# Patient Record
Sex: FEMALE | Race: WHITE | Employment: PART TIME | URBAN - METROPOLITAN AREA
[De-identification: names, ages, dates, MRNs, and addresses within clinical notes are randomized per-mention and may not be internally consistent; named-entity substitution may affect disease eponyms.]

---

## 2018-10-11 ENCOUNTER — OFFICE VISIT (OUTPATIENT)
Dept: AUDIOLOGY | Facility: CLINIC | Age: 53
End: 2018-10-11
Payer: COMMERCIAL

## 2018-10-11 ENCOUNTER — OFFICE VISIT (OUTPATIENT)
Dept: OTOLARYNGOLOGY | Facility: CLINIC | Age: 53
End: 2018-10-11
Payer: COMMERCIAL

## 2018-10-11 VITALS
DIASTOLIC BLOOD PRESSURE: 60 MMHG | WEIGHT: 115 LBS | HEIGHT: 63 IN | BODY MASS INDEX: 20.37 KG/M2 | SYSTOLIC BLOOD PRESSURE: 98 MMHG | TEMPERATURE: 99 F

## 2018-10-11 DIAGNOSIS — H93.12 TINNITUS OF LEFT EAR: Primary | ICD-10-CM

## 2018-10-11 DIAGNOSIS — H93.12 TINNITUS, LEFT: Primary | ICD-10-CM

## 2018-10-11 PROCEDURE — 92557 COMPREHENSIVE HEARING TEST: CPT | Performed by: AUDIOLOGIST

## 2018-10-11 PROCEDURE — 99243 OFF/OP CNSLTJ NEW/EST LOW 30: CPT | Performed by: OTOLARYNGOLOGY

## 2018-10-11 PROCEDURE — 99212 OFFICE O/P EST SF 10 MIN: CPT | Performed by: OTOLARYNGOLOGY

## 2018-10-11 PROCEDURE — 92570 ACOUSTIC IMMITANCE TESTING: CPT | Performed by: AUDIOLOGIST

## 2018-10-11 RX ORDER — ALPRAZOLAM 0.5 MG/1
0.5 TABLET ORAL NIGHTLY PRN
COMMUNITY

## 2018-10-11 RX ORDER — DEXTROAMPHETAMINE SACCHARATE, AMPHETAMINE ASPARTATE, DEXTROAMPHETAMINE SULFATE AND AMPHETAMINE SULFATE 2.5; 2.5; 2.5; 2.5 MG/1; MG/1; MG/1; MG/1
10 TABLET ORAL DAILY
COMMUNITY

## 2018-10-11 RX ORDER — OXCARBAZEPINE 300 MG/1
300 TABLET, FILM COATED ORAL 3 TIMES DAILY
COMMUNITY

## 2018-10-11 RX ORDER — LANSOPRAZOLE 30 MG/1
30 CAPSULE, DELAYED RELEASE ORAL
COMMUNITY
End: 2020-03-17

## 2018-10-11 RX ORDER — ZOLPIDEM TARTRATE 10 MG/1
10 TABLET ORAL NIGHTLY PRN
COMMUNITY

## 2018-10-11 RX ORDER — DULOXETIN HYDROCHLORIDE 30 MG/1
30 CAPSULE, DELAYED RELEASE ORAL DAILY
COMMUNITY
End: 2021-04-29

## 2018-10-11 NOTE — PROGRESS NOTES
Remi Levy is a 47year old female. Patient presents with:  Ringing In Ear: in left ear for years, getting  worse, feel unbalanced lately      HISTORY OF PRESENT ILLNESS  She presents with a 10-year history of asymmetric left ear ringing.   She feels joselito weight loss. ENMT Negative Drooling. Eyes Negative Blurred vision and vision changes. Respiratory Negative Dyspnea and wheezing. Cardio Negative Chest pain, irregular heartbeat/palpitations and syncope. GI Negative Abdominal pain and diarrhea. Outpatient Medications:   •  lansoprazole (PREVACID) 30 MG Oral Capsule Delayed Release, Take 30 mg by mouth every morning before breakfast., Disp: , Rfl:   •  Zolpidem Tartrate 10 MG Oral Tab, Take 10 mg by mouth nightly as needed for Sleep., Disp: , Rfl:

## 2018-10-11 NOTE — PATIENT INSTRUCTIONS
How Hearing Aids Can Help You    If you’re losing your hearing, it can be frustrating: But, hearing aids can help you hear what Voncille Flattery been missing. Not everyone who has hearing loss needs hearing aids.  But if your hearing loss is keeping you from commun © 8328-3266 [employer] [address] Andreina [address]., [place], [address]. All rights reserved. This information is not intended as a substitute for professional medical care. Always follow your healthcare professional's instructions.

## 2018-10-11 NOTE — PROGRESS NOTES
AUDIOGRAM     Edgar Alvarez was referred for testing by Maggie Miller for left sided tinnitus  7/18/1965  [id number]        Otoscopic inspection: right ear no cerumen; left ear no cerumen.        Tests/Procedures  Patient was tested via  standard i

## 2020-02-18 ENCOUNTER — TELEPHONE (OUTPATIENT)
Dept: OBGYN CLINIC | Facility: CLINIC | Age: 55
End: 2020-02-18

## 2020-03-13 ENCOUNTER — TELEPHONE (OUTPATIENT)
Dept: FAMILY MEDICINE CLINIC | Facility: CLINIC | Age: 55
End: 2020-03-13

## 2020-03-13 RX ORDER — LANSOPRAZOLE 30 MG/1
30 CAPSULE, DELAYED RELEASE ORAL
Qty: 30 CAPSULE | Refills: 0 | Status: SHIPPED | OUTPATIENT
Start: 2020-03-13 | End: 2020-12-30

## 2020-03-13 NOTE — TELEPHONE ENCOUNTER
[facility] now [facility] This [facility] refilled med with #30    Pt was scheduled to see [address] on 2/17/2020 but missed appmnt; pt is scheduled to see [address] on 4/3/2020. LOV with [address] was 3/11/19; last Rf then given for med below x 1 year.

## 2020-03-17 RX ORDER — LANSOPRAZOLE 30 MG/1
30 CAPSULE, DELAYED RELEASE ORAL
Qty: 90 CAPSULE | Refills: 1 | Status: SHIPPED | OUTPATIENT
Start: 2020-03-17 | End: 2020-07-27

## 2020-03-17 NOTE — TELEPHONE ENCOUNTER
lansoprazole (PREVACID) 30 MG Oral Capsule Delayed Release pt needs refill sent to [facility] pharmacy ASAP!

## 2020-07-27 RX ORDER — LANSOPRAZOLE 30 MG/1
CAPSULE, DELAYED RELEASE ORAL
Qty: 90 CAPSULE | Refills: 1 | Status: SHIPPED | OUTPATIENT
Start: 2020-07-27 | End: 2020-09-28

## 2020-07-27 NOTE — TELEPHONE ENCOUNTER
A refill request was received for:  Requested Prescriptions     Pending Prescriptions Disp Refills   • LANSOPRAZOLE 30 MG Oral Capsule Delayed Release [Pharmacy Med Name: Bao Farrell DR CAP 30MG RX] 90 capsule 1     Sig: TAKE 1 CAPSULE EVERY       MORNING BE

## 2020-09-21 ENCOUNTER — TELEPHONE (OUTPATIENT)
Dept: FAMILY MEDICINE CLINIC | Facility: CLINIC | Age: 55
End: 2020-09-21

## 2020-09-21 RX ORDER — VALACYCLOVIR HYDROCHLORIDE 1 G/1
1 TABLET, FILM COATED ORAL EVERY 12 HOURS SCHEDULED
Qty: 21 TABLET | Refills: 0 | Status: SHIPPED | OUTPATIENT
Start: 2020-09-21 | End: 2020-12-12

## 2020-09-21 RX ORDER — VALACYCLOVIR HYDROCHLORIDE 1 G/1
TABLET, FILM COATED ORAL EVERY 12 HOURS SCHEDULED
COMMUNITY
End: 2020-09-21

## 2020-09-21 NOTE — TELEPHONE ENCOUNTER
A refill request was received for:  Requested Prescriptions     Pending Prescriptions Disp Refills   • valACYclovir HCl 1 G Oral Tab 21 tablet 0     Sig: Take 1 tablet (1,000 mg total) by mouth every 12 (twelve) hours.      Last refill date:  2015  Qty:   L

## 2020-09-21 NOTE — TELEPHONE ENCOUNTER
Patient requesting an refill on Valacyclovir states will like to be sent to [facility] in [facility] ,[address] [facility]

## 2020-09-21 NOTE — TELEPHONE ENCOUNTER
Called pt and informed that due for visit, rescheduled sooner for physical. Will route to Dr. Renetta Hammonds medication refill of Valacyclovir. Pt verbalized understanding.

## 2020-09-22 NOTE — TELEPHONE ENCOUNTER
Pt is scheduled to see [facility] on 9/28/2020. Left VM that med was Rf as below. Pt to call with any questions. Becky White, DO  You; Kymberly Rasmussen RN 9 minutes ago (1:58 PM)     Please send refill for valtrex.     Message text      Outpatient Medication Detail

## 2020-09-28 ENCOUNTER — TELEPHONE (OUTPATIENT)
Dept: FAMILY MEDICINE CLINIC | Facility: CLINIC | Age: 55
End: 2020-09-28

## 2020-09-28 ENCOUNTER — OFFICE VISIT (OUTPATIENT)
Dept: FAMILY MEDICINE CLINIC | Facility: CLINIC | Age: 55
End: 2020-09-28
Payer: COMMERCIAL

## 2020-09-28 VITALS
SYSTOLIC BLOOD PRESSURE: 122 MMHG | OXYGEN SATURATION: 99 % | BODY MASS INDEX: 20.91 KG/M2 | TEMPERATURE: 98 F | DIASTOLIC BLOOD PRESSURE: 72 MMHG | HEIGHT: 63 IN | HEART RATE: 81 BPM | WEIGHT: 118 LBS

## 2020-09-28 DIAGNOSIS — J31.0 CHRONIC RHINITIS: ICD-10-CM

## 2020-09-28 DIAGNOSIS — Z12.4 CERVICAL CANCER SCREENING: ICD-10-CM

## 2020-09-28 DIAGNOSIS — Z00.00 WELLNESS EXAMINATION: ICD-10-CM

## 2020-09-28 DIAGNOSIS — J06.9 VIRAL UPPER RESPIRATORY TRACT INFECTION: ICD-10-CM

## 2020-09-28 DIAGNOSIS — M85.80 OSTEOPENIA, UNSPECIFIED LOCATION: ICD-10-CM

## 2020-09-28 DIAGNOSIS — D64.9 ANEMIA, UNSPECIFIED TYPE: ICD-10-CM

## 2020-09-28 DIAGNOSIS — R79.89 ELEVATED LFTS: ICD-10-CM

## 2020-09-28 DIAGNOSIS — K21.9 GASTROESOPHAGEAL REFLUX DISEASE WITHOUT ESOPHAGITIS: ICD-10-CM

## 2020-09-28 DIAGNOSIS — Z12.31 ENCOUNTER FOR SCREENING MAMMOGRAM FOR BREAST CANCER: Primary | ICD-10-CM

## 2020-09-28 DIAGNOSIS — E55.9 VITAMIN D DEFICIENCY: ICD-10-CM

## 2020-09-28 DIAGNOSIS — Z12.11 COLON CANCER SCREENING: ICD-10-CM

## 2020-09-28 PROCEDURE — 3074F SYST BP LT 130 MM HG: CPT | Performed by: FAMILY MEDICINE

## 2020-09-28 PROCEDURE — 3078F DIAST BP <80 MM HG: CPT | Performed by: FAMILY MEDICINE

## 2020-09-28 PROCEDURE — 90471 IMMUNIZATION ADMIN: CPT | Performed by: FAMILY MEDICINE

## 2020-09-28 PROCEDURE — 3008F BODY MASS INDEX DOCD: CPT | Performed by: FAMILY MEDICINE

## 2020-09-28 PROCEDURE — 90686 IIV4 VACC NO PRSV 0.5 ML IM: CPT | Performed by: FAMILY MEDICINE

## 2020-09-28 PROCEDURE — 99396 PREV VISIT EST AGE 40-64: CPT | Performed by: FAMILY MEDICINE

## 2020-09-28 PROCEDURE — 88175 CYTOPATH C/V AUTO FLUID REDO: CPT | Performed by: FAMILY MEDICINE

## 2020-09-28 RX ORDER — FLUTICASONE PROPIONATE 50 MCG
2 SPRAY, SUSPENSION (ML) NASAL DAILY
Qty: 3 BOTTLE | Refills: 3 | Status: SHIPPED | OUTPATIENT
Start: 2020-09-28 | End: 2020-09-28

## 2020-09-28 RX ORDER — FLUTICASONE PROPIONATE 50 MCG
2 SPRAY, SUSPENSION (ML) NASAL DAILY
Qty: 3 BOTTLE | Refills: 3 | Status: SHIPPED | OUTPATIENT
Start: 2020-09-28 | End: 2020-10-12

## 2020-09-28 RX ORDER — DOXEPIN HYDROCHLORIDE 10 MG/1
30 CAPSULE ORAL NIGHTLY
Refills: 0 | COMMUNITY
Start: 2020-09-28

## 2020-09-28 NOTE — PROGRESS NOTES
HPI:    Patient ID: Yinka Lopes is a 48year old female who presents for physical.  Pt with remote hx of alcohol abuse, hx of elevated LFTs  Alcohol of choice:  1 bottle /day 7.5  Vodka:  1/2 bottle of fifth, 2 years  Patient complains of chronic rhinitis surgical history. Current Outpatient Medications   Medication Sig Dispense Refill   • Doxepin HCl 10 MG Oral Cap Take 1 capsule (10 mg total) by mouth nightly. 0   • Fluticasone Propionate 50 MCG/ACT Nasal Suspension 2 sprays by Each Nare route daily. distress  HEENT normal  Neck supple without adenopathy, thyromegaly, carotid bruit  Heart regular rate and rhythm without murmur  Lungs clear to auscultation  Abdomen soft without visceromegaly, masses, tenderness  Bilateral breast exam without masses, ski capsule (10 mg total) by mouth nightly.; Refill: 0  - Fluticasone Propionate 50 MCG/ACT Nasal Suspension; 2 sprays by Each Nare route daily. Dispense: 3 Bottle;  Refill: 3       Meds This Visit:  Requested Prescriptions     Signed Prescriptions Disp Refill

## 2020-09-28 NOTE — TELEPHONE ENCOUNTER
Doxepin HCl 10 MG Oral Cap  0 9/28/2020    Sig:   Take 1 capsule (10 mg total) by mouth nightly. Route:   Oral     Class:   Historical     Order #:   [id number]      Above medication is historical per Dr Horacio Ames.             Fluticasone Propionate 50 MCG/A

## 2020-09-28 NOTE — PATIENT INSTRUCTIONS
Get shingles shot called Shingrix. 2 shot series. Get one and then the repeat one 3 to 6 months later.   You can get it at [facility] or [place] [address].

## 2020-10-01 ENCOUNTER — LAB ENCOUNTER (OUTPATIENT)
Dept: LAB | Age: 55
End: 2020-10-01
Attending: FAMILY MEDICINE
Payer: COMMERCIAL

## 2020-10-01 DIAGNOSIS — D64.9 ANEMIA, UNSPECIFIED TYPE: ICD-10-CM

## 2020-10-01 DIAGNOSIS — J06.9 VIRAL UPPER RESPIRATORY TRACT INFECTION: ICD-10-CM

## 2020-10-01 DIAGNOSIS — Z00.00 WELLNESS EXAMINATION: ICD-10-CM

## 2020-10-01 DIAGNOSIS — R79.89 ELEVATED LFTS: ICD-10-CM

## 2020-10-01 DIAGNOSIS — E55.9 VITAMIN D DEFICIENCY: ICD-10-CM

## 2020-10-01 PROCEDURE — 84466 ASSAY OF TRANSFERRIN: CPT

## 2020-10-01 PROCEDURE — 83540 ASSAY OF IRON: CPT

## 2020-10-01 PROCEDURE — 82607 VITAMIN B-12: CPT

## 2020-10-01 PROCEDURE — 86803 HEPATITIS C AB TEST: CPT

## 2020-10-01 PROCEDURE — 85025 COMPLETE CBC W/AUTO DIFF WBC: CPT

## 2020-10-01 PROCEDURE — 80061 LIPID PANEL: CPT

## 2020-10-01 PROCEDURE — 36415 COLL VENOUS BLD VENIPUNCTURE: CPT

## 2020-10-01 PROCEDURE — 82306 VITAMIN D 25 HYDROXY: CPT

## 2020-10-01 PROCEDURE — 80053 COMPREHEN METABOLIC PANEL: CPT

## 2020-10-01 PROCEDURE — 81001 URINALYSIS AUTO W/SCOPE: CPT

## 2020-10-02 ENCOUNTER — PATIENT MESSAGE (OUTPATIENT)
Dept: FAMILY MEDICINE CLINIC | Facility: CLINIC | Age: 55
End: 2020-10-02

## 2020-10-05 NOTE — TELEPHONE ENCOUNTER
From: Shruti Frederick  To: Parul Do DO  Sent: 10/2/2020 6:38 PM CDT  Subject: Test Results Question    I only got the results for the IRON test. I should have been tested for many other things. Why did I only get results for this?

## 2020-10-07 ENCOUNTER — TELEPHONE (OUTPATIENT)
Dept: FAMILY MEDICINE CLINIC | Facility: CLINIC | Age: 55
End: 2020-10-07

## 2020-10-07 ENCOUNTER — PATIENT MESSAGE (OUTPATIENT)
Dept: FAMILY MEDICINE CLINIC | Facility: CLINIC | Age: 55
End: 2020-10-07

## 2020-10-07 DIAGNOSIS — E87.1 HYPONATREMIA: Primary | ICD-10-CM

## 2020-10-08 ENCOUNTER — TELEPHONE (OUTPATIENT)
Dept: FAMILY MEDICINE CLINIC | Facility: CLINIC | Age: 55
End: 2020-10-08

## 2020-10-08 NOTE — TELEPHONE ENCOUNTER
Andre Shah DO 10/8/2020 8:20 AM CDT    Please notify pt shingrix vaccine ordered.   ----- Message -----  From: Iona Chery RN  Sent: 10/7/2020 12:01 PM CDT  To: Gaston Kirk DO  Subject: FW: Test Results Question       ----- Message -----  From: Tiki Vasquez

## 2020-10-12 DIAGNOSIS — E87.1 HYPONATREMIA: Primary | ICD-10-CM

## 2020-10-12 RX ORDER — FLUTICASONE PROPIONATE 50 MCG
2 SPRAY, SUSPENSION (ML) NASAL DAILY
Qty: 3 BOTTLE | Refills: 3 | Status: SHIPPED | OUTPATIENT
Start: 2020-10-12 | End: 2021-10-07

## 2020-10-14 NOTE — TELEPHONE ENCOUNTER
Background, Traci 10/13/2020 9:01 PM CDT    I never heard back from you. I said I was getting this test done on the 19th. Can you add the thyroid test on that day please? Just to save time going back and forth and delaying figuring out what is wrong.

## 2020-10-19 ENCOUNTER — NURSE ONLY (OUTPATIENT)
Dept: FAMILY MEDICINE CLINIC | Facility: CLINIC | Age: 55
End: 2020-10-19
Payer: COMMERCIAL

## 2020-10-19 ENCOUNTER — LABORATORY ENCOUNTER (OUTPATIENT)
Dept: LAB | Facility: REFERENCE LAB | Age: 55
End: 2020-10-19
Attending: FAMILY MEDICINE
Payer: COMMERCIAL

## 2020-10-19 DIAGNOSIS — E87.1 HYPONATREMIA: ICD-10-CM

## 2020-10-19 PROCEDURE — 90471 IMMUNIZATION ADMIN: CPT | Performed by: FAMILY MEDICINE

## 2020-10-19 PROCEDURE — 90750 HZV VACC RECOMBINANT IM: CPT | Performed by: FAMILY MEDICINE

## 2020-10-19 PROCEDURE — 36415 COLL VENOUS BLD VENIPUNCTURE: CPT

## 2020-10-19 PROCEDURE — 84439 ASSAY OF FREE THYROXINE: CPT

## 2020-10-19 PROCEDURE — 80048 BASIC METABOLIC PNL TOTAL CA: CPT

## 2020-10-19 PROCEDURE — 84443 ASSAY THYROID STIM HORMONE: CPT

## 2020-10-19 NOTE — TELEPHONE ENCOUNTER
Good Morning.       Can you tell me as I can't understand many of the test results, what my results came back for the Covid antibody test?  I was supposed to have that done at my last blood test.       TY

## 2020-10-20 DIAGNOSIS — J06.9 VIRAL UPPER RESPIRATORY TRACT INFECTION: Primary | ICD-10-CM

## 2020-10-22 ENCOUNTER — PATIENT MESSAGE (OUTPATIENT)
Dept: FAMILY MEDICINE CLINIC | Facility: CLINIC | Age: 55
End: 2020-10-22

## 2020-10-22 ENCOUNTER — TELEPHONE (OUTPATIENT)
Dept: FAMILY MEDICINE CLINIC | Facility: CLINIC | Age: 55
End: 2020-10-22

## 2020-10-22 DIAGNOSIS — E87.1 HYPONATREMIA WITH DECREASED SERUM OSMOLALITY: Primary | ICD-10-CM

## 2020-10-22 NOTE — TELEPHONE ENCOUNTER
Attempted to call Dr. yRder Murray to address pt's concerns but no answer.   Informed that it is Dr. Tracy Benson day off, non-emergent issue and may not answer her calls but will also speak with her tomorrow in the am.    URGENT!         [address] ADD

## 2020-10-22 NOTE — TELEPHONE ENCOUNTER
Pt called and scheduled lab, was told to fast and informed that not needed. Pt verbalized understanding.

## 2020-10-22 NOTE — TELEPHONE ENCOUNTER
Rosibel Shah, DO  to Minus Diamond          10/22/20 11:02 AM  Janine Fan Edward Bynum,     I left a message on your phone.     Your sodium has gone lower.   Please come in early tomorrow AM for blood /urine tests to help determine the cause of your low sodium.

## 2020-10-23 ENCOUNTER — LAB ENCOUNTER (OUTPATIENT)
Dept: LAB | Age: 55
End: 2020-10-23
Attending: FAMILY MEDICINE
Payer: COMMERCIAL

## 2020-10-23 ENCOUNTER — APPOINTMENT (OUTPATIENT)
Dept: LAB | Age: 55
End: 2020-10-23
Attending: FAMILY MEDICINE
Payer: COMMERCIAL

## 2020-10-23 DIAGNOSIS — J06.9 VIRAL UPPER RESPIRATORY TRACT INFECTION: ICD-10-CM

## 2020-10-23 DIAGNOSIS — E87.1 HYPO-OSMOLAR HYPONATREMIA: Primary | ICD-10-CM

## 2020-10-23 DIAGNOSIS — E87.1 HYPONATREMIA WITH DECREASED SERUM OSMOLALITY: ICD-10-CM

## 2020-10-23 PROCEDURE — 80048 BASIC METABOLIC PNL TOTAL CA: CPT

## 2020-10-23 PROCEDURE — 86769 SARS-COV-2 COVID-19 ANTIBODY: CPT

## 2020-10-23 PROCEDURE — 82533 TOTAL CORTISOL: CPT

## 2020-10-23 PROCEDURE — 36415 COLL VENOUS BLD VENIPUNCTURE: CPT

## 2020-10-23 PROCEDURE — 83935 ASSAY OF URINE OSMOLALITY: CPT

## 2020-10-23 PROCEDURE — 84300 ASSAY OF URINE SODIUM: CPT

## 2020-10-23 NOTE — TELEPHONE ENCOUNTER
To: ROSAG 10 DR. JEF TAY      From: Mari Him      Created: 10/23/2020 1:16 PM            I didn't Del Rosario's I had to ask that question until I got home. Can  you get name for me? Called pt to ask about my chart message above.   Per pt did not know

## 2020-10-23 NOTE — TELEPHONE ENCOUNTER
From: Stefanie Hu  To: Dean Rodas DO  Sent: 10/22/2020 2:10 PM CDT  Subject: Other    URGENT! DR Elizabet Cristobal PLEASE TEST ME FOR SYLWIA'S DISEASE TOMORROW. PLEASE PLEASE PLEASE PUT ON MY LAB FOR TOMORROW. I WILL BE IN YOUR OFFICE AT 9:00.  I THINK THIS IS

## 2020-10-23 NOTE — TELEPHONE ENCOUNTER
Spoke with Dr. Ghanshyam Winters as planned this am at 0820 and appropriate labs order in the system. My chart message sent.     Me  to Martin Woodall          8:31 AM  Janine Ramirez,      Per Dr. Ghanshyam Winters the appropriate labs are in the system, also ask to speak to the [facility]

## 2020-10-24 ENCOUNTER — TELEPHONE (OUTPATIENT)
Dept: ENDOCRINOLOGY CLINIC | Facility: CLINIC | Age: 55
End: 2020-10-24

## 2020-10-24 NOTE — TELEPHONE ENCOUNTER
----- Message from Katie Manrique DO sent at 10/23/2020  5:12 PM CDT -----  I spoke with you today about this patient with euvolemic hypotonic hyponatremia.     Dr Otero

## 2020-10-24 NOTE — TELEPHONE ENCOUNTER
Endo staff please schedule patient for appointment next week with Dr. Miki Shelley. SY - do you have any openings? Or could you add her somewhere earlier? She currently has appt with you on 11/11. Thanks.

## 2020-10-26 NOTE — TELEPHONE ENCOUNTER
I think there was some confusion on this call. She needs to be seen this week - patient paged after hours. Told her to come Wednesday, October 28 at 1:30pm with dr. Dot Arias. Please book appointment. I know it is slightly an overbook.       [address] Miguel Angel Jackson

## 2020-10-26 NOTE — TELEPHONE ENCOUNTER
Called to schedule apt.  Pt states she was expecting a call from a different doctor to be seen sooner but she is not to sure if that will happen since it is 5:00pm. She asked to be called tomorrow around 1:30pm.    PLEASE CALL TOMORROW !!!

## 2020-10-26 NOTE — TELEPHONE ENCOUNTER
Forwarded to [address] [place] [address] staff to schedule appointment with Dr. Heladio Carranza.

## 2020-10-27 NOTE — TELEPHONE ENCOUNTER
Below appointment booked. Please advise if a change is needed. Thank you.      Future Appointments   Date Time Provider Sandeep Ventura   10/28/2020  1:40 PM Keegan Miller MD [address] [place] [address]

## 2020-10-28 ENCOUNTER — OFFICE VISIT (OUTPATIENT)
Dept: ENDOCRINOLOGY CLINIC | Facility: CLINIC | Age: 55
End: 2020-10-28
Payer: COMMERCIAL

## 2020-10-28 ENCOUNTER — LAB ENCOUNTER (OUTPATIENT)
Dept: LAB | Facility: HOSPITAL | Age: 55
End: 2020-10-28
Attending: INTERNAL MEDICINE
Payer: COMMERCIAL

## 2020-10-28 VITALS
BODY MASS INDEX: 20.02 KG/M2 | WEIGHT: 113 LBS | DIASTOLIC BLOOD PRESSURE: 69 MMHG | HEART RATE: 77 BPM | SYSTOLIC BLOOD PRESSURE: 130 MMHG | HEIGHT: 63 IN

## 2020-10-28 DIAGNOSIS — E87.1 HYPONATREMIA: ICD-10-CM

## 2020-10-28 DIAGNOSIS — R53.83 FATIGUE, UNSPECIFIED TYPE: ICD-10-CM

## 2020-10-28 DIAGNOSIS — E87.1 HYPONATREMIA: Primary | ICD-10-CM

## 2020-10-28 PROCEDURE — 84443 ASSAY THYROID STIM HORMONE: CPT

## 2020-10-28 PROCEDURE — 82670 ASSAY OF TOTAL ESTRADIOL: CPT

## 2020-10-28 PROCEDURE — 3075F SYST BP GE 130 - 139MM HG: CPT | Performed by: INTERNAL MEDICINE

## 2020-10-28 PROCEDURE — 84480 ASSAY TRIIODOTHYRONINE (T3): CPT

## 2020-10-28 PROCEDURE — 3008F BODY MASS INDEX DOCD: CPT | Performed by: INTERNAL MEDICINE

## 2020-10-28 PROCEDURE — 84146 ASSAY OF PROLACTIN: CPT

## 2020-10-28 PROCEDURE — 83001 ASSAY OF GONADOTROPIN (FSH): CPT

## 2020-10-28 PROCEDURE — 99243 OFF/OP CNSLTJ NEW/EST LOW 30: CPT | Performed by: INTERNAL MEDICINE

## 2020-10-28 PROCEDURE — 83002 ASSAY OF GONADOTROPIN (LH): CPT

## 2020-10-28 PROCEDURE — 36415 COLL VENOUS BLD VENIPUNCTURE: CPT

## 2020-10-28 PROCEDURE — 3078F DIAST BP <80 MM HG: CPT | Performed by: INTERNAL MEDICINE

## 2020-10-28 PROCEDURE — 84305 ASSAY OF SOMATOMEDIN: CPT

## 2020-10-28 PROCEDURE — 84439 ASSAY OF FREE THYROXINE: CPT

## 2020-10-28 NOTE — TELEPHONE ENCOUNTER
See lab results, on 10/26/20 Dr. Catalina Ragsdale informed pt via my chart that results are normal.  Also pt saw Dr. Adenike Chambers at [facility].

## 2020-10-28 NOTE — H&P
New Patient Evaluation - History and Physical    CONSULT - Reason for Visit:  Hyponatremia. Requesting Physician: Dr. Smita Hong.     CHIEF COMPLAINT:  Patient presents with:  Consult: pt c/o hot flashes, polyuria, excessive thirst, brain fog, chronic fatigue, Suspension 2 sprays by Each Nare route daily. 3 Bottle 3   • Doxepin HCl 10 MG Oral Cap Take 1 capsule (10 mg total) by mouth nightly. 0   • valACYclovir HCl 1 G Oral Tab Take 1 tablet (1,000 mg total) by mouth every 12 (twelve) hours.  21 tablet 0   • ruby Normocephalic, atraumatic  NECK:  Supple, symmetrical, trachea midline, no adenopathy, thyroid symmetric, not enlarged and no tenderness  LUNGS: clear to auscultation bilaterally, no crackles or wheezing  CARDIOVASCULAR:  regular rate and rhythm, normal S1

## 2020-10-29 ENCOUNTER — TELEPHONE (OUTPATIENT)
Dept: ENDOCRINOLOGY CLINIC | Facility: CLINIC | Age: 55
End: 2020-10-29

## 2020-10-29 ENCOUNTER — PATIENT MESSAGE (OUTPATIENT)
Dept: ENDOCRINOLOGY CLINIC | Facility: CLINIC | Age: 55
End: 2020-10-29

## 2020-10-29 DIAGNOSIS — R53.83 FATIGUE, UNSPECIFIED TYPE: Primary | ICD-10-CM

## 2020-10-29 NOTE — PROGRESS NOTES
LVM that results per Dr. Sultana Fortune are normal, noted test results viewed by pt and encouraged to call if have questions.

## 2020-10-30 ENCOUNTER — TELEPHONE (OUTPATIENT)
Dept: ENDOCRINOLOGY CLINIC | Facility: CLINIC | Age: 55
End: 2020-10-30

## 2020-10-30 DIAGNOSIS — E03.9 HYPOTHYROIDISM, UNSPECIFIED TYPE: Primary | ICD-10-CM

## 2020-10-30 NOTE — TELEPHONE ENCOUNTER
Please call patient and let her know that her TSH is normal and that her low free T4 is most likely due to the medication that she is taking (oxcarbazepine) which has been known to be associated with a low free T4. We are waiting for the IGF-1 level.  I

## 2020-10-30 NOTE — TELEPHONE ENCOUNTER
From: Hansa Farrell  To: Kaycee Aguilar MD  Sent: 10/29/2020 7:59 PM CDT  Subject: Test Results Question    Hello I saw your message. What does that mean?  I am assuming I still have a thyroid issue and need to limit my water intake and come back in a

## 2020-10-30 NOTE — TELEPHONE ENCOUNTER
From: Aman Espinoza  To: Kaycee Lion MD  Sent: 10/29/2020 8:04 PM CDT  Subject: Test Results Question    I'm wondering I called your office to make sure you received my. Messages from yesterday which you didn't answer.  So more testing and then you

## 2020-11-02 ENCOUNTER — TELEPHONE (OUTPATIENT)
Dept: ENDOCRINOLOGY CLINIC | Facility: CLINIC | Age: 55
End: 2020-11-02

## 2020-11-02 NOTE — TELEPHONE ENCOUNTER
rn called patient  And informed orders are in system and she can call central sched.  To book sina, verbalized understanding

## 2020-11-02 NOTE — TELEPHONE ENCOUNTER
Hi!  I sent a reply to the patient saying that it would be fine for her to have the blood test the morning of or the day before the visit. Thank you!   Kaycee

## 2020-11-02 NOTE — TELEPHONE ENCOUNTER
Regarding: Test Results Question  Contact: [phone]  ----- Message from Nila Ames RN sent at 11/2/2020  9:49 AM CST -----       ----- Message from Viji Das to Lexx Grant MD sent at 11/1/2020  8:32 PM -----   I have a question ab

## 2020-11-04 ENCOUNTER — OFFICE VISIT (OUTPATIENT)
Dept: ENDOCRINOLOGY CLINIC | Facility: CLINIC | Age: 55
End: 2020-11-04
Payer: COMMERCIAL

## 2020-11-04 ENCOUNTER — LAB ENCOUNTER (OUTPATIENT)
Dept: LAB | Facility: HOSPITAL | Age: 55
End: 2020-11-04
Attending: OTOLARYNGOLOGY
Payer: COMMERCIAL

## 2020-11-04 VITALS
HEIGHT: 63 IN | RESPIRATION RATE: 17 BRPM | SYSTOLIC BLOOD PRESSURE: 109 MMHG | HEART RATE: 85 BPM | WEIGHT: 116 LBS | DIASTOLIC BLOOD PRESSURE: 54 MMHG | BODY MASS INDEX: 20.55 KG/M2

## 2020-11-04 DIAGNOSIS — E03.9 HYPOTHYROIDISM, UNSPECIFIED TYPE: ICD-10-CM

## 2020-11-04 DIAGNOSIS — E87.1 HYPONATREMIA: ICD-10-CM

## 2020-11-04 DIAGNOSIS — E03.9 ACQUIRED HYPOTHYROIDISM: ICD-10-CM

## 2020-11-04 DIAGNOSIS — E22.2 SIADH (SYNDROME OF INAPPROPRIATE ADH PRODUCTION) (HCC): Primary | ICD-10-CM

## 2020-11-04 PROCEDURE — 84439 ASSAY OF FREE THYROXINE: CPT

## 2020-11-04 PROCEDURE — 99243 OFF/OP CNSLTJ NEW/EST LOW 30: CPT | Performed by: INTERNAL MEDICINE

## 2020-11-04 PROCEDURE — 99072 ADDL SUPL MATRL&STAF TM PHE: CPT | Performed by: INTERNAL MEDICINE

## 2020-11-04 PROCEDURE — 3008F BODY MASS INDEX DOCD: CPT | Performed by: INTERNAL MEDICINE

## 2020-11-04 PROCEDURE — 80048 BASIC METABOLIC PNL TOTAL CA: CPT

## 2020-11-04 PROCEDURE — 3074F SYST BP LT 130 MM HG: CPT | Performed by: INTERNAL MEDICINE

## 2020-11-04 PROCEDURE — 3078F DIAST BP <80 MM HG: CPT | Performed by: INTERNAL MEDICINE

## 2020-11-04 PROCEDURE — 36415 COLL VENOUS BLD VENIPUNCTURE: CPT

## 2020-11-04 PROCEDURE — 84443 ASSAY THYROID STIM HORMONE: CPT

## 2020-11-04 RX ORDER — CETIRIZINE HYDROCHLORIDE 10 MG/1
TABLET ORAL
COMMUNITY

## 2020-11-04 NOTE — PROGRESS NOTES
Follow-up- Reason for Visit:  Hyponatremia. Requesting Physician: Dr. Karine Garcia. CHIEF COMPLAINT:  Fatigue     INITIAL VISIT:   Sean Vides is a 48year old female who presents with hyponatremia, fatigue and polydipsia.  She states that she fills her wa swelling, dysphonia  Respiratory: Negative for:  dyspnea, cough  Cardiovascular: Negative for:  chest pain, palpitations, orthopnea  GI: Negative for:  abdominal pain, nausea, vomiting, diarrhea, constipation, bleeding  Neurology: Negative for: headache, n [place] Wetzel- [phone], to see if she can be switched to other medications that will not cause SIADH      11/04/2020  Kaycee Rangel MD

## 2020-11-05 DIAGNOSIS — E03.9 HYPOTHYROIDISM (ACQUIRED): Primary | ICD-10-CM

## 2020-11-05 RX ORDER — LEVOTHYROXINE SODIUM 0.03 MG/1
25 TABLET ORAL
Qty: 90 TABLET | Refills: 1 | Status: SHIPPED | OUTPATIENT
Start: 2020-11-05 | End: 2021-01-07

## 2020-11-27 ENCOUNTER — PATIENT MESSAGE (OUTPATIENT)
Dept: ENDOCRINOLOGY CLINIC | Facility: CLINIC | Age: 55
End: 2020-11-27

## 2020-11-27 NOTE — TELEPHONE ENCOUNTER
Received [employer] message from [address]:    Message below. CardioVIPhart message sent to patient.

## 2020-11-27 NOTE — TELEPHONE ENCOUNTER
From: Donna Notice  To: Kaycee Rangel MD  Sent: 11/27/2020 9:20 AM CST  Subject: Referral Request    [facility]! I have referred my mom to you Mosetta Gosselin) and checked with her primary first Carry Rocky).  She feels as bad as I did wh

## 2020-12-14 ENCOUNTER — LAB ENCOUNTER (OUTPATIENT)
Dept: LAB | Facility: REFERENCE LAB | Age: 55
End: 2020-12-14
Attending: INTERNAL MEDICINE
Payer: COMMERCIAL

## 2020-12-14 DIAGNOSIS — E03.9 HYPOTHYROIDISM (ACQUIRED): ICD-10-CM

## 2020-12-14 PROCEDURE — 36415 COLL VENOUS BLD VENIPUNCTURE: CPT

## 2020-12-14 PROCEDURE — 84439 ASSAY OF FREE THYROXINE: CPT

## 2020-12-14 PROCEDURE — 84443 ASSAY THYROID STIM HORMONE: CPT

## 2020-12-14 RX ORDER — VALACYCLOVIR HYDROCHLORIDE 1 G/1
1 TABLET, FILM COATED ORAL EVERY 12 HOURS SCHEDULED
Qty: 21 TABLET | Refills: 0 | Status: SHIPPED | OUTPATIENT
Start: 2020-12-14

## 2020-12-16 ENCOUNTER — PATIENT MESSAGE (OUTPATIENT)
Dept: ENDOCRINOLOGY CLINIC | Facility: CLINIC | Age: 55
End: 2020-12-16

## 2020-12-16 ENCOUNTER — TELEMEDICINE (OUTPATIENT)
Dept: ENDOCRINOLOGY CLINIC | Facility: CLINIC | Age: 55
End: 2020-12-16
Payer: COMMERCIAL

## 2020-12-16 DIAGNOSIS — E22.2 SIADH (SYNDROME OF INAPPROPRIATE ADH PRODUCTION) (HCC): Primary | ICD-10-CM

## 2020-12-16 DIAGNOSIS — E03.9 HYPOTHYROIDISM, UNSPECIFIED TYPE: ICD-10-CM

## 2020-12-16 PROCEDURE — 99212 OFFICE O/P EST SF 10 MIN: CPT | Performed by: INTERNAL MEDICINE

## 2020-12-16 NOTE — PROGRESS NOTES
Please note that the following visit was completed using two-way, real-time interactive audio and video communication.   This has been done in good keyshawn to provide continuity of care in the best interest of the provider-patient relationship, due to the emelia Tab Take 1 tablet (25 mcg total) by mouth before breakfast. Please take first thing in the morning, about 1 hour before eating or drinking anything, or taking any medications. If you forget to take the medication on one day, please take two the next day.  9 for: polyuria, does have polydypsia  Skin: Negative for: rash, blister, cellulitis,    PHYSICAL EXAM:   Constitutional: she is not in acute distress, normal appearance, not ill appearing  HENT: Head: atraumatic, no obvious fullness apparent in the throat a

## 2020-12-17 NOTE — TELEPHONE ENCOUNTER
From: Nuzhat Arnold  To: Kaycee Carranza MD  Sent: 12/16/2020 8:02 AM CST  Subject: Other    Janine Carranza, I have an appointment with you today at 1:20 and I am having car problems. Can I do a Facetime or e-visit with you today please?  I did my

## 2020-12-30 RX ORDER — LANSOPRAZOLE 30 MG/1
CAPSULE, DELAYED RELEASE ORAL
Qty: 90 CAPSULE | Refills: 1 | Status: SHIPPED | OUTPATIENT
Start: 2020-12-30

## 2020-12-30 NOTE — TELEPHONE ENCOUNTER
A refill request was received for:  Requested Prescriptions     Pending Prescriptions Disp Refills   • LANSOPRAZOLE 30 MG Oral Capsule Delayed Release [Pharmacy Med Name: Tad Glover DR, CAP 30MG RX] 90 capsule 1     Sig: TAKE 1 CAPSULE EVERY       MORNING BE

## 2021-01-07 ENCOUNTER — PATIENT MESSAGE (OUTPATIENT)
Dept: ENDOCRINOLOGY CLINIC | Facility: CLINIC | Age: 56
End: 2021-01-07

## 2021-01-07 RX ORDER — LEVOTHYROXINE SODIUM 0.03 MG/1
25 TABLET ORAL
Qty: 90 TABLET | Refills: 1 | Status: CANCELLED | OUTPATIENT
Start: 2021-01-07

## 2021-01-07 RX ORDER — LEVOTHYROXINE SODIUM 0.03 MG/1
25 TABLET ORAL
Qty: 90 TABLET | Refills: 1 | Status: SHIPPED | OUTPATIENT
Start: 2021-01-07 | End: 2021-03-23

## 2021-01-07 NOTE — TELEPHONE ENCOUNTER
From: Brianda Ocasio  To: Kaycee Seth MD  Sent: 1/7/2021 8:54 AM CST  Subject: Nadya Swift Dr. Delmis Seth,    I am running low on my medicine. I put in for a refill that you have to approve.  I do have a couple of weeks, but mail o

## 2021-01-22 ENCOUNTER — PATIENT MESSAGE (OUTPATIENT)
Dept: FAMILY MEDICINE CLINIC | Facility: CLINIC | Age: 56
End: 2021-01-22

## 2021-01-22 ENCOUNTER — TELEPHONE (OUTPATIENT)
Dept: FAMILY MEDICINE CLINIC | Facility: CLINIC | Age: 56
End: 2021-01-22

## 2021-01-22 NOTE — TELEPHONE ENCOUNTER
LMOAM  All parameters of BMD have worsened save LS. Osteopenia, but pt is young. I recommend pt go to endocrinology.

## 2021-01-23 ENCOUNTER — PATIENT MESSAGE (OUTPATIENT)
Dept: FAMILY MEDICINE CLINIC | Facility: CLINIC | Age: 56
End: 2021-01-23

## 2021-01-25 ENCOUNTER — PATIENT MESSAGE (OUTPATIENT)
Dept: FAMILY MEDICINE CLINIC | Facility: CLINIC | Age: 56
End: 2021-01-25

## 2021-01-25 NOTE — TELEPHONE ENCOUNTER
Janine Ramirez,      I called the [facility] for the Dexa results this am but they have not faxed the results to me. You can call them and request a copy.   Any concerns or additional questions, please call us at .

## 2021-01-25 NOTE — TELEPHONE ENCOUNTER
Cecelia Romo, DO 2 days ago        Dr. Brissa Mercado,     I noticed looking through prescriptions for Lansoprazole that I received DELAYED pills and regular pills.  I really really prefer the DELAYED if you could please change that prescription.

## 2021-01-25 NOTE — TELEPHONE ENCOUNTER
Nia and pt did Dexa 12/20. Request for report and they will fax.     Dr. Stephon Sauer phone number is [phone] and called his office to obtain fax number [phone]

## 2021-01-26 NOTE — TELEPHONE ENCOUNTER
Janine Ramirez,      The number to [facility] ([facility]) is  and they will be very happy to provide you with the Dexa Scan results for your other provider.   I looked for the scan in your chart and did not find it so you can call them and they will assist

## 2021-01-27 ENCOUNTER — TELEPHONE (OUTPATIENT)
Dept: ENDOCRINOLOGY CLINIC | Facility: CLINIC | Age: 56
End: 2021-01-27

## 2021-01-27 NOTE — TELEPHONE ENCOUNTER
Patient called in stating she is going to fax in the test results from a recent bone density. Patient is requesting a call from RN to confirm the fax was received.  Please follow up

## 2021-01-28 ENCOUNTER — TELEPHONE (OUTPATIENT)
Dept: ENDOCRINOLOGY CLINIC | Facility: CLINIC | Age: 56
End: 2021-01-28

## 2021-01-28 ENCOUNTER — LAB ENCOUNTER (OUTPATIENT)
Dept: LAB | Age: 56
End: 2021-01-28
Attending: INTERNAL MEDICINE
Payer: COMMERCIAL

## 2021-01-28 DIAGNOSIS — E03.9 HYPOTHYROIDISM, UNSPECIFIED TYPE: ICD-10-CM

## 2021-01-28 DIAGNOSIS — E22.2 SIADH (SYNDROME OF INAPPROPRIATE ADH PRODUCTION) (HCC): ICD-10-CM

## 2021-01-28 LAB
ANION GAP SERPL CALC-SCNC: 6 MMOL/L (ref 0–18)
BUN BLD-MCNC: 12 MG/DL (ref 7–18)
BUN/CREAT SERPL: 16 (ref 10–20)
CALCIUM BLD-MCNC: 9.6 MG/DL (ref 8.5–10.1)
CHLORIDE SERPL-SCNC: 103 MMOL/L (ref 98–112)
CO2 SERPL-SCNC: 28 MMOL/L (ref 21–32)
CREAT BLD-MCNC: 0.75 MG/DL
GLUCOSE BLD-MCNC: 101 MG/DL (ref 70–99)
OSMOLALITY SERPL CALC.SUM OF ELEC: 284 MOSM/KG (ref 275–295)
PATIENT FASTING Y/N/NP: YES
POTASSIUM SERPL-SCNC: 3.8 MMOL/L (ref 3.5–5.1)
SODIUM SERPL-SCNC: 137 MMOL/L (ref 136–145)
T4 FREE SERPL-MCNC: 1 NG/DL (ref 0.8–1.7)
TSI SER-ACNC: 0.67 MIU/ML (ref 0.36–3.74)

## 2021-01-28 PROCEDURE — 84443 ASSAY THYROID STIM HORMONE: CPT

## 2021-01-28 PROCEDURE — 36415 COLL VENOUS BLD VENIPUNCTURE: CPT

## 2021-01-28 PROCEDURE — 80048 BASIC METABOLIC PNL TOTAL CA: CPT

## 2021-01-28 PROCEDURE — 84439 ASSAY OF FREE THYROXINE: CPT

## 2021-01-28 NOTE — TELEPHONE ENCOUNTER
Called and left message with our [facility] [employer] fax number .  So far have not received dexa result at this location

## 2021-01-28 NOTE — TELEPHONE ENCOUNTER
Pt just had blood drawn at the lab -  forgot to add sodium to the order - she was told they could still test for it if order is sent over now

## 2021-01-28 NOTE — TELEPHONE ENCOUNTER
Lab orders were BMP, TSH and FT4. RN informed patient that BMP includes her sodium level. She voiced understanding.

## 2021-02-02 ENCOUNTER — PATIENT MESSAGE (OUTPATIENT)
Dept: ENDOCRINOLOGY CLINIC | Facility: CLINIC | Age: 56
End: 2021-02-02

## 2021-02-06 ENCOUNTER — TELEMEDICINE (OUTPATIENT)
Dept: ENDOCRINOLOGY CLINIC | Facility: CLINIC | Age: 56
End: 2021-02-06

## 2021-02-06 DIAGNOSIS — M85.89 OSTEOPENIA OF MULTIPLE SITES: ICD-10-CM

## 2021-02-06 DIAGNOSIS — E03.9 HYPOTHYROIDISM, UNSPECIFIED TYPE: ICD-10-CM

## 2021-02-06 DIAGNOSIS — E87.1 HYPONATREMIA: ICD-10-CM

## 2021-02-06 DIAGNOSIS — E22.2 SIADH (SYNDROME OF INAPPROPRIATE ADH PRODUCTION) (HCC): Primary | ICD-10-CM

## 2021-02-06 PROCEDURE — 99214 OFFICE O/P EST MOD 30 MIN: CPT | Performed by: INTERNAL MEDICINE

## 2021-02-06 NOTE — PROGRESS NOTES
Please note that the following visit was completed using two-way, real-time interactive audio and video communication.   This has been done in good keyshawn to provide continuity of care in the best interest of the provider-patient relationship, due to the emelia -0.8  BMD measured at Femur Left Neck- 0.723 g/cm T-score- -2.3  BMD measured at Femur Right Neck- 0.712 g/cm T-score- -2.3  BMD measured at Femur Total Left- 0.750 g/cm T-score- -2.0  BMD measured at Femur Total Right- 0.738 g/cm T-score- -2.1    FRAX peter HCl 30 MG Oral Cap DR Particles Take 30 mg by mouth daily. Takes Vitamin D3- 2,000 IU    ALLERGIES:    Seasonal                OTHER (SEE COMMENTS)    Comment:Sneezing, watery eyes.       ASSESSMENTS:     REVIEW OF SYSTEMS:  Constitutional: Negative for depression, alcohol dependence who is here for follow-up of hyponatremia and fatigue. She is doing well, but there is room for improvement. TSH is within normal limits. She also has a new diagnosis of osteopenia and is here for evaluation and management.

## 2021-02-19 ENCOUNTER — PATIENT MESSAGE (OUTPATIENT)
Dept: ENDOCRINOLOGY CLINIC | Facility: CLINIC | Age: 56
End: 2021-02-19

## 2021-02-22 NOTE — TELEPHONE ENCOUNTER
Dr. Adrianna Greenfield, is patient to take calcium citrate and vitamin D3?     Per televisit 2/6/21,  1.) Osteopenia- She does not require treatment at this point but would optimize calcium and vitamin D supplementation  - Takes vitamin D and calcium supplementatio

## 2021-02-22 NOTE — TELEPHONE ENCOUNTER
From: Stefan Orozco  To: Kaycee Lees MD  Sent: 2/19/2021 3:24 PM CST  Subject: Visit [facility] Dr. Trinity Lees,    I have a question. I got the calcium citrate that I ordered. Do I take that in addition to the D3? Thanks!

## 2021-02-22 NOTE — TELEPHONE ENCOUNTER
Hi!  She should take the CaCitrate in addition to the vitamin D that she is already taking. Thank you!

## 2021-03-16 RX ORDER — VALACYCLOVIR HYDROCHLORIDE 1 G/1
1 TABLET, FILM COATED ORAL EVERY 12 HOURS SCHEDULED
Qty: 21 TABLET | Refills: 0 | OUTPATIENT
Start: 2021-03-16

## 2021-03-16 NOTE — TELEPHONE ENCOUNTER
A refill request was received for:  Requested Prescriptions     Pending Prescriptions Disp Refills   • valACYclovir HCl 1 G Oral Tab 21 tablet 0     Sig: Take 1 tablet (1,000 mg total) by mouth every 12 (twelve) hours.      Last refill date: 12/14/2020  Higinio Canavan

## 2021-03-17 ENCOUNTER — TELEPHONE (OUTPATIENT)
Dept: INTERNAL MEDICINE CLINIC | Facility: CLINIC | Age: 56
End: 2021-03-17

## 2021-03-17 NOTE — TELEPHONE ENCOUNTER
Patient had appointment scheduled with [facility] on 4/5 to establish care- needed to rescheduled to due [facility] schedule change, patient wanting to get shingles shot at time of appointment.  She states she'll receive first dose of COVID vaccine on 3/26 wondering if it

## 2021-03-24 RX ORDER — LEVOTHYROXINE SODIUM 0.03 MG/1
25 TABLET ORAL
Qty: 90 TABLET | Refills: 1 | Status: SHIPPED | OUTPATIENT
Start: 2021-03-24 | End: 2021-10-23

## 2021-04-13 NOTE — H&P
[address] [facility] [address] Gastroenterology                                                                                                  Clinic History and Physical     Pa Allergies, ROS:      No past medical history on file. No past surgical history on file.    Family Hx:   Family History   Problem Relation Age of Onset   • Hypertension Father    • Cancer Mother       Social History: Social History    Tobacco Use      Smok mouth daily. Allergies:    Seasonal                OTHER (SEE COMMENTS)    Comment:Sneezing, watery eyes.     ROS:   CONSTITUTIONAL:  negative for fevers, rigors  EYES:  negative for diplopia   RESPIRATORY:  negative for severe shortness of breath colon cancer and personal history of colon polyps. The recall is uncertain and outside records are not available for review. The patient states that her PCP has stated that she is overdue for a repeat procedure due to a family history.   Given that her mo well as the risks of anesthesia and perforation all leading to prolonged hospitalization or surgical intervention. Miss rate of colonoscopy of 5-10% discussed.   It is the patient's responsibility to contact his/her insurance company regarding questions abo

## 2021-04-22 NOTE — TELEPHONE ENCOUNTER
nAdre Shah, DO  You 26 minutes ago (9:04 AM)     Good morning Lisette Ramirez,     Please notify the patient that this lab order was placed on Monday.  Please check the chart to see if something was allready done so the patient can know.      Thanks,    Message text normal... well appearing and in no apparent distress.

## 2021-04-27 ENCOUNTER — OFFICE VISIT (OUTPATIENT)
Dept: GASTROENTEROLOGY | Facility: CLINIC | Age: 56
End: 2021-04-27
Payer: COMMERCIAL

## 2021-04-27 ENCOUNTER — TELEPHONE (OUTPATIENT)
Dept: GASTROENTEROLOGY | Facility: CLINIC | Age: 56
End: 2021-04-27

## 2021-04-27 VITALS
SYSTOLIC BLOOD PRESSURE: 114 MMHG | BODY MASS INDEX: 19.49 KG/M2 | HEIGHT: 63 IN | WEIGHT: 110 LBS | DIASTOLIC BLOOD PRESSURE: 65 MMHG | HEART RATE: 90 BPM

## 2021-04-27 DIAGNOSIS — Z86.010 HX OF COLONIC POLYP: ICD-10-CM

## 2021-04-27 DIAGNOSIS — Z12.11 SCREENING FOR COLON CANCER: Primary | ICD-10-CM

## 2021-04-27 DIAGNOSIS — Z80.0 FAMILY HISTORY OF COLON CANCER: ICD-10-CM

## 2021-04-27 DIAGNOSIS — Z86.010 HISTORY OF COLON POLYPS: ICD-10-CM

## 2021-04-27 DIAGNOSIS — Z12.11 COLON CANCER SCREENING: Primary | ICD-10-CM

## 2021-04-27 DIAGNOSIS — Z80.0 FH: GI CANCER: ICD-10-CM

## 2021-04-27 PROCEDURE — 3074F SYST BP LT 130 MM HG: CPT | Performed by: NURSE PRACTITIONER

## 2021-04-27 PROCEDURE — 99203 OFFICE O/P NEW LOW 30 MIN: CPT | Performed by: NURSE PRACTITIONER

## 2021-04-27 PROCEDURE — 3078F DIAST BP <80 MM HG: CPT | Performed by: NURSE PRACTITIONER

## 2021-04-27 PROCEDURE — 3008F BODY MASS INDEX DOCD: CPT | Performed by: NURSE PRACTITIONER

## 2021-04-27 RX ORDER — SODIUM, POTASSIUM,MAG SULFATES 17.5-3.13G
SOLUTION, RECONSTITUTED, ORAL ORAL
Qty: 1 BOTTLE | Refills: 0 | Status: SHIPPED | OUTPATIENT
Start: 2021-04-27 | End: 2021-04-29

## 2021-04-27 NOTE — PATIENT INSTRUCTIONS
-Schedule colonoscopy w/Dr. Lauren Bear or Dr. Sheri Bowie w/ [facility] or [facility]   -Eligible for NE: Yes  -Prep: Split dose Suprep or Colyte/TriLyte or equivalent related to history hyponatremia  -Anti-platelets and anti-coagulants: None  -Diabetes meds: None

## 2021-04-27 NOTE — TELEPHONE ENCOUNTER
Scheduled for:  Colonoscopy [address]  Provider Name:  Dr Panfilo Neal  Date:  8/10/2021  Location:  [facility]  Sedation:  MAC  Time:  0900 (pt is aware to arrive at 0800)  Prep:  Colyte  Meds/Allergies Reconciled?:  Physician reviewed  Diagnosis with codes:  Colon

## 2021-04-29 ENCOUNTER — OFFICE VISIT (OUTPATIENT)
Dept: INTERNAL MEDICINE CLINIC | Facility: CLINIC | Age: 56
End: 2021-04-29
Payer: COMMERCIAL

## 2021-04-29 VITALS
SYSTOLIC BLOOD PRESSURE: 98 MMHG | BODY MASS INDEX: 19.84 KG/M2 | DIASTOLIC BLOOD PRESSURE: 64 MMHG | WEIGHT: 112 LBS | HEIGHT: 63 IN | HEART RATE: 76 BPM

## 2021-04-29 DIAGNOSIS — Z00.00 PE (PHYSICAL EXAM), ROUTINE: Primary | ICD-10-CM

## 2021-04-29 PROCEDURE — 90750 HZV VACC RECOMBINANT IM: CPT | Performed by: INTERNAL MEDICINE

## 2021-04-29 PROCEDURE — 3008F BODY MASS INDEX DOCD: CPT | Performed by: INTERNAL MEDICINE

## 2021-04-29 PROCEDURE — 3078F DIAST BP <80 MM HG: CPT | Performed by: INTERNAL MEDICINE

## 2021-04-29 PROCEDURE — 90471 IMMUNIZATION ADMIN: CPT | Performed by: INTERNAL MEDICINE

## 2021-04-29 PROCEDURE — 3074F SYST BP LT 130 MM HG: CPT | Performed by: INTERNAL MEDICINE

## 2021-04-29 PROCEDURE — 99386 PREV VISIT NEW AGE 40-64: CPT | Performed by: INTERNAL MEDICINE

## 2021-04-29 NOTE — PROGRESS NOTES
HPI/Subjective:   Patient ID: Zaria Wiley is a 48year old female.   Patient presents with:  Immunization/Injection: Here to obtain her second Shingrix injection  Physical: Follow up appt and to establish care       Patient presents today for physical exam Oral Tab 1 TABLET DAILY     • Fluticasone Propionate 50 MCG/ACT Nasal Suspension 2 sprays by Each Nare route daily. 3 Bottle 3   • Doxepin HCl 10 MG Oral Cap Take 1 capsule (10 mg total) by mouth nightly.   0   • Zolpidem Tartrate 10 MG Oral Tab Take 10 mg Cervical: No cervical adenopathy. Skin:     General: Skin is warm and dry. Neurological:      Mental Status: She is alert and oriented to person, place, and time.    Psychiatric:         Behavior: Behavior normal.     Blood pressure 98/64, pulse 76, hei

## 2021-07-02 ENCOUNTER — TELEPHONE (OUTPATIENT)
Dept: GASTROENTEROLOGY | Facility: CLINIC | Age: 56
End: 2021-07-02

## 2021-07-02 NOTE — TELEPHONE ENCOUNTER
Patient faxed over previous records from colonoscopy completed at [facility] on 12/2/2011. Will place on Kenia's desk to be reviewed.

## 2021-07-06 NOTE — TELEPHONE ENCOUNTER
Nursing: Please let the patient know that he is able to review the outside records as below. Given that the procedure report is dated from 2011 she has been to this year for a routine 10-year colonoscopy recall.   The polyp that they found previously did n

## 2021-07-06 NOTE — TELEPHONE ENCOUNTER
Nursing: Are there supposed to be notes on my desk for review? I did not see anything on my desk when I came in this morning.

## 2021-07-21 ENCOUNTER — LABORATORY ENCOUNTER (OUTPATIENT)
Dept: LAB | Age: 56
End: 2021-07-21
Attending: INTERNAL MEDICINE
Payer: COMMERCIAL

## 2021-07-21 DIAGNOSIS — E22.2 SIADH (SYNDROME OF INAPPROPRIATE ADH PRODUCTION) (HCC): ICD-10-CM

## 2021-07-21 DIAGNOSIS — E03.9 HYPOTHYROIDISM, UNSPECIFIED TYPE: ICD-10-CM

## 2021-07-21 DIAGNOSIS — Z00.00 PE (PHYSICAL EXAM), ROUTINE: ICD-10-CM

## 2021-07-21 LAB
ALBUMIN SERPL-MCNC: 4 G/DL (ref 3.4–5)
ALBUMIN/GLOB SERPL: 1.2 {RATIO} (ref 1–2)
ALP LIVER SERPL-CCNC: 74 U/L
ALT SERPL-CCNC: 27 U/L
ANION GAP SERPL CALC-SCNC: 4 MMOL/L (ref 0–18)
AST SERPL-CCNC: 14 U/L (ref 15–37)
BASOPHILS # BLD AUTO: 0.03 X10(3) UL (ref 0–0.2)
BASOPHILS NFR BLD AUTO: 0.6 %
BILIRUB SERPL-MCNC: 0.3 MG/DL (ref 0.1–2)
BILIRUB UR QL: NEGATIVE
BUN BLD-MCNC: 13 MG/DL (ref 7–18)
BUN/CREAT SERPL: 16.9 (ref 10–20)
CALCIUM BLD-MCNC: 9.6 MG/DL (ref 8.5–10.1)
CHLORIDE SERPL-SCNC: 107 MMOL/L (ref 98–112)
CHOLEST SMN-MCNC: 234 MG/DL (ref ?–200)
CLARITY UR: CLEAR
CO2 SERPL-SCNC: 29 MMOL/L (ref 21–32)
COLOR UR: YELLOW
CREAT BLD-MCNC: 0.77 MG/DL
DEPRECATED RDW RBC AUTO: 41 FL (ref 35.1–46.3)
EOSINOPHIL # BLD AUTO: 0.17 X10(3) UL (ref 0–0.7)
EOSINOPHIL NFR BLD AUTO: 3.7 %
ERYTHROCYTE [DISTWIDTH] IN BLOOD BY AUTOMATED COUNT: 12.6 % (ref 11–15)
GLOBULIN PLAS-MCNC: 3.3 G/DL (ref 2.8–4.4)
GLUCOSE BLD-MCNC: 89 MG/DL (ref 70–99)
GLUCOSE UR-MCNC: NEGATIVE MG/DL
HCT VFR BLD AUTO: 40.6 %
HDLC SERPL-MCNC: 91 MG/DL (ref 40–59)
HGB BLD-MCNC: 13.6 G/DL
HGB UR QL STRIP.AUTO: NEGATIVE
IMM GRANULOCYTES # BLD AUTO: 0.01 X10(3) UL (ref 0–1)
IMM GRANULOCYTES NFR BLD: 0.2 %
KETONES UR-MCNC: NEGATIVE MG/DL
LDLC SERPL CALC-MCNC: 131 MG/DL (ref ?–100)
LEUKOCYTE ESTERASE UR QL STRIP.AUTO: NEGATIVE
LYMPHOCYTES # BLD AUTO: 1.37 X10(3) UL (ref 1–4)
LYMPHOCYTES NFR BLD AUTO: 29.7 %
M PROTEIN MFR SERPL ELPH: 7.3 G/DL (ref 6.4–8.2)
MCH RBC QN AUTO: 29.6 PG (ref 26–34)
MCHC RBC AUTO-ENTMCNC: 33.5 G/DL (ref 31–37)
MCV RBC AUTO: 88.5 FL
MONOCYTES # BLD AUTO: 0.45 X10(3) UL (ref 0.1–1)
MONOCYTES NFR BLD AUTO: 9.7 %
NEUTROPHILS # BLD AUTO: 2.59 X10 (3) UL (ref 1.5–7.7)
NEUTROPHILS # BLD AUTO: 2.59 X10(3) UL (ref 1.5–7.7)
NEUTROPHILS NFR BLD AUTO: 56.1 %
NITRITE UR QL STRIP.AUTO: NEGATIVE
NONHDLC SERPL-MCNC: 143 MG/DL (ref ?–130)
OSMOLALITY SERPL CALC.SUM OF ELEC: 290 MOSM/KG (ref 275–295)
PATIENT FASTING Y/N/NP: NO
PATIENT FASTING Y/N/NP: NO
PH UR: 7 [PH] (ref 5–8)
PLATELET # BLD AUTO: 187 10(3)UL (ref 150–450)
POTASSIUM SERPL-SCNC: 3.9 MMOL/L (ref 3.5–5.1)
PROT UR-MCNC: NEGATIVE MG/DL
RBC # BLD AUTO: 4.59 X10(6)UL
SODIUM SERPL-SCNC: 140 MMOL/L (ref 136–145)
SP GR UR STRIP: 1.01 (ref 1–1.03)
T4 FREE SERPL-MCNC: 0.8 NG/DL (ref 0.8–1.7)
TRIGL SERPL-MCNC: 68 MG/DL (ref 30–149)
TSI SER-ACNC: 0.52 MIU/ML (ref 0.36–3.74)
UROBILINOGEN UR STRIP-ACNC: <2
VLDLC SERPL CALC-MCNC: 12 MG/DL (ref 0–30)
WBC # BLD AUTO: 4.6 X10(3) UL (ref 4–11)

## 2021-07-21 PROCEDURE — 84443 ASSAY THYROID STIM HORMONE: CPT

## 2021-07-21 PROCEDURE — 36415 COLL VENOUS BLD VENIPUNCTURE: CPT

## 2021-07-21 PROCEDURE — 85025 COMPLETE CBC W/AUTO DIFF WBC: CPT

## 2021-07-21 PROCEDURE — 80053 COMPREHEN METABOLIC PANEL: CPT

## 2021-07-21 PROCEDURE — 80061 LIPID PANEL: CPT

## 2021-07-21 PROCEDURE — 81003 URINALYSIS AUTO W/O SCOPE: CPT | Performed by: INTERNAL MEDICINE

## 2021-07-21 PROCEDURE — 84439 ASSAY OF FREE THYROXINE: CPT

## 2021-07-29 ENCOUNTER — TELEMEDICINE (OUTPATIENT)
Dept: ENDOCRINOLOGY CLINIC | Facility: CLINIC | Age: 56
End: 2021-07-29

## 2021-07-29 DIAGNOSIS — E87.1 HYPONATREMIA: ICD-10-CM

## 2021-07-29 DIAGNOSIS — R53.83 FATIGUE, UNSPECIFIED TYPE: ICD-10-CM

## 2021-07-29 DIAGNOSIS — E55.9 VITAMIN D DEFICIENCY: ICD-10-CM

## 2021-07-29 DIAGNOSIS — M85.89 OSTEOPENIA OF MULTIPLE SITES: ICD-10-CM

## 2021-07-29 DIAGNOSIS — E03.9 ACQUIRED HYPOTHYROIDISM: Primary | ICD-10-CM

## 2021-07-29 PROCEDURE — 99214 OFFICE O/P EST MOD 30 MIN: CPT | Performed by: INTERNAL MEDICINE

## 2021-07-29 NOTE — PROGRESS NOTES
Please note that the following visit was completed using two-way, real-time interactive audio and video communication.   This has been done in good keyshawn to provide continuity of care in the best interest of the provider-patient relationship, due to the emelia Femur Left Neck- 0.723 g/cm T-score- -2.3  BMD measured at Femur Right Neck- 0.712 g/cm T-score- -2.3  BMD measured at Femur Total Left- 0.750 g/cm T-score- -2.0  BMD measured at Femur Total Right- 0.738 g/cm T-score- -2.1    FRAX calculated- risk of major IU    ALLERGIES:    Seasonal                OTHER (SEE COMMENTS)    Comment:Sneezing, watery eyes.       ASSESSMENTS:     REVIEW OF SYSTEMS:  Constitutional: Negative for:  fever, cold/heat intolerance; does have weight gain and fatigue sometimes  Eyes: Neg follow-up of hyponatremia and fatigue. She is doing well. TSH is within normal limits. She also has a new diagnosis of osteopenia and is here for evaluation and management.     1.) Osteopenia- She does not require treatment at this point but would optimize

## 2021-08-03 ENCOUNTER — PATIENT MESSAGE (OUTPATIENT)
Dept: GASTROENTEROLOGY | Facility: CLINIC | Age: 56
End: 2021-08-03

## 2021-08-04 NOTE — TELEPHONE ENCOUNTER
From: Amada Figures  To: Emmanuelle Dumont MD  Sent: 8/3/2021 8:56 PM CDT  Subject: Non-Urgent Medical Question    Hello I am coming in for a colonoscopy on August 10th. I don't see a time when I have to be there.  I need to arrange for a ride and n

## 2021-08-06 RX ORDER — TRAZODONE HYDROCHLORIDE 100 MG/1
100 TABLET ORAL NIGHTLY
COMMUNITY

## 2021-08-06 RX ORDER — LAMOTRIGINE 100 MG/1
100 TABLET ORAL 2 TIMES DAILY
COMMUNITY

## 2021-08-09 ENCOUNTER — LABORATORY ENCOUNTER (OUTPATIENT)
Dept: LAB | Age: 56
End: 2021-08-09
Attending: INTERNAL MEDICINE
Payer: COMMERCIAL

## 2021-08-09 DIAGNOSIS — E03.9 ACQUIRED HYPOTHYROIDISM: ICD-10-CM

## 2021-08-09 DIAGNOSIS — E55.9 VITAMIN D DEFICIENCY: ICD-10-CM

## 2021-08-09 LAB
T4 FREE SERPL-MCNC: 0.9 NG/DL (ref 0.8–1.7)
TSI SER-ACNC: 0.54 MIU/ML (ref 0.36–3.74)

## 2021-08-09 PROCEDURE — 82306 VITAMIN D 25 HYDROXY: CPT

## 2021-08-09 PROCEDURE — 36415 COLL VENOUS BLD VENIPUNCTURE: CPT

## 2021-08-09 PROCEDURE — 84439 ASSAY OF FREE THYROXINE: CPT

## 2021-08-09 PROCEDURE — 84443 ASSAY THYROID STIM HORMONE: CPT

## 2021-08-10 ENCOUNTER — HOSPITAL ENCOUNTER (OUTPATIENT)
Facility: HOSPITAL | Age: 56
Setting detail: HOSPITAL OUTPATIENT SURGERY
Discharge: HOME | End: 2021-08-10
Attending: INTERNAL MEDICINE | Admitting: INTERNAL MEDICINE
Payer: COMMERCIAL

## 2021-08-10 ENCOUNTER — ANESTHESIA EVENT (OUTPATIENT)
Dept: ENDOSCOPY | Facility: HOSPITAL | Age: 56
End: 2021-08-10
Payer: COMMERCIAL

## 2021-08-10 ENCOUNTER — ANESTHESIA (OUTPATIENT)
Dept: ENDOSCOPY | Facility: HOSPITAL | Age: 56
End: 2021-08-10
Payer: COMMERCIAL

## 2021-08-10 VITALS
HEIGHT: 63 IN | TEMPERATURE: 97 F | RESPIRATION RATE: 13 BRPM | OXYGEN SATURATION: 100 % | BODY MASS INDEX: 20.37 KG/M2 | HEART RATE: 58 BPM | SYSTOLIC BLOOD PRESSURE: 101 MMHG | DIASTOLIC BLOOD PRESSURE: 58 MMHG | WEIGHT: 115 LBS

## 2021-08-10 DIAGNOSIS — Z86.010 HX OF COLONIC POLYP: ICD-10-CM

## 2021-08-10 DIAGNOSIS — Z12.11 COLON CANCER SCREENING: ICD-10-CM

## 2021-08-10 DIAGNOSIS — Z80.0 FH: GI CANCER: ICD-10-CM

## 2021-08-10 LAB — VIT D+METAB SERPL-MCNC: 55.6 NG/ML (ref 30–100)

## 2021-08-10 PROCEDURE — 0DBL8ZX EXCISION OF TRANSVERSE COLON, VIA NATURAL OR ARTIFICIAL OPENING ENDOSCOPIC, DIAGNOSTIC: ICD-10-PCS | Performed by: INTERNAL MEDICINE

## 2021-08-10 PROCEDURE — 0DBM8ZX EXCISION OF DESCENDING COLON, VIA NATURAL OR ARTIFICIAL OPENING ENDOSCOPIC, DIAGNOSTIC: ICD-10-PCS | Performed by: INTERNAL MEDICINE

## 2021-08-10 PROCEDURE — 45385 COLONOSCOPY W/LESION REMOVAL: CPT | Performed by: INTERNAL MEDICINE

## 2021-08-10 RX ORDER — LIDOCAINE HYDROCHLORIDE 10 MG/ML
INJECTION, SOLUTION EPIDURAL; INFILTRATION; INTRACAUDAL; PERINEURAL AS NEEDED
Status: DISCONTINUED | OUTPATIENT
Start: 2021-08-10 | End: 2021-08-10 | Stop reason: SURG

## 2021-08-10 RX ORDER — SODIUM CHLORIDE, SODIUM LACTATE, POTASSIUM CHLORIDE, CALCIUM CHLORIDE 600; 310; 30; 20 MG/100ML; MG/100ML; MG/100ML; MG/100ML
INJECTION, SOLUTION INTRAVENOUS CONTINUOUS
Status: DISCONTINUED | OUTPATIENT
Start: 2021-08-10 | End: 2021-08-10

## 2021-08-10 RX ORDER — MIDAZOLAM HYDROCHLORIDE 1 MG/ML
INJECTION INTRAMUSCULAR; INTRAVENOUS AS NEEDED
Status: DISCONTINUED | OUTPATIENT
Start: 2021-08-10 | End: 2021-08-10 | Stop reason: SURG

## 2021-08-10 RX ORDER — NALOXONE HYDROCHLORIDE 0.4 MG/ML
80 INJECTION, SOLUTION INTRAMUSCULAR; INTRAVENOUS; SUBCUTANEOUS AS NEEDED
Status: DISCONTINUED | OUTPATIENT
Start: 2021-08-10 | End: 2021-08-10

## 2021-08-10 RX ADMIN — MIDAZOLAM HYDROCHLORIDE 2 MG: 1 INJECTION INTRAMUSCULAR; INTRAVENOUS at 08:59:00

## 2021-08-10 RX ADMIN — SODIUM CHLORIDE, SODIUM LACTATE, POTASSIUM CHLORIDE, CALCIUM CHLORIDE: 600; 310; 30; 20 INJECTION, SOLUTION INTRAVENOUS at 09:32:00

## 2021-08-10 RX ADMIN — SODIUM CHLORIDE, SODIUM LACTATE, POTASSIUM CHLORIDE, CALCIUM CHLORIDE: 600; 310; 30; 20 INJECTION, SOLUTION INTRAVENOUS at 08:58:00

## 2021-08-10 RX ADMIN — LIDOCAINE HYDROCHLORIDE 50 MG: 10 INJECTION, SOLUTION EPIDURAL; INFILTRATION; INTRACAUDAL; PERINEURAL at 08:59:00

## 2021-08-10 NOTE — ANESTHESIA POSTPROCEDURE EVALUATION
Patient: Virginia Weiss    Procedure Summary     Date: 08/10/21 Room / Location: [facility] ENDOSCOPY 04 / [facility] ENDOSCOPY    Anesthesia Start: 8760 Anesthesia Stop:     Procedure: COLONOSCOPY (N/A ) Diagnosis:       Colon cancer screening      Hx of colonic po

## 2021-08-10 NOTE — ANESTHESIA PREPROCEDURE EVALUATION
Anesthesia PreOp Note    HPI:     Concha Bhatia is a 55year old female who presents for preoperative consultation requested by: Ashley Flores MD    Date of Surgery: 8/10/2021    Procedure(s):  COLONOSCOPY  Indication: Colon cancer screening, POTENCY OR), 2 TABLETS DAILY, Disp: , Rfl:   cetirizine 10 MG Oral Tab, 1 TABLET DAILY, Disp: , Rfl: , 8/8/2021  Doxepin HCl 10 MG Oral Cap, Take 30 mg by mouth nightly.  , Disp: , Rfl: 0, 8/8/2021  ALPRAZolam 0.5 MG Oral Tab, Take 0.5 mg by mouth nightly Transportation Needs:       Lack of Transportation (Medical):       Lack of Transportation (Non-Medical):   Physical Activity:       Days of Exercise per Week:       Minutes of Exercise per Session:   Stress:       Feeling of Stress :   Social Connection Consent Plan and Risks Discussed With:  Patient  Discussed plan with:  Surgeon      I have informed Loletta Labor and/or legal guardian or family member of the nature of the anesthetic plan, benefits, risks including possible dental damage if relevant

## 2021-08-10 NOTE — OPERATIVE REPORT
[facility] Endoscopy Report      Date of Procedure:  08/10/21      Preoperative Diagnosis:  1. Colorectal cancer screening  2. Family history of colon cancer  3.   Personal history of colon polyps      Postoperative Diagnosis:  Colon polyp there was a 4-5 mm sessile polyp which was cold snare excised and retrieved. 2.  In the descending colon there was a 5 5–6 mm sessile polyp which was cold snare excised and retrieved. Inspection of both sites revealed no evidence of ongoing bleeding.

## 2021-08-10 NOTE — H&P
History & Physical Examination    Patient Name: Liset Cohn  MRN: [id number]  [facility]: [id number]  YOB: 1965    Diagnosis: Rectal cancer screening, family history of colon cancer, personal history of colon polyps      traZODone 100 MG Oral thyroid     hypothyroidism   • Esophageal reflux      Past Surgical History:   Procedure Laterality Date   •        Family History   Problem Relation Age of Onset   • Hypertension Father    • Cancer Mother         colon     Social History    [place]

## 2021-08-16 ENCOUNTER — TELEPHONE (OUTPATIENT)
Dept: GASTROENTEROLOGY | Facility: CLINIC | Age: 56
End: 2021-08-16

## 2021-08-16 NOTE — TELEPHONE ENCOUNTER
----- Message from Jeremiah Ruvalcaba MD sent at 8/13/2021  5:37 PM CDT -----  Freedom Noyola tried to call on both your numbers but was unable to leave a voicemail message. Your colonoscopy revealed #2 small polyps which were removed.   The polyps were tubular a

## 2021-08-16 NOTE — TELEPHONE ENCOUNTER
Recall colon in 5 years per Dr. Irma Corona. Last done:8/10/21  Next due:8/10/26    Updated health maintenance and pt outreach.

## 2021-09-28 ENCOUNTER — NURSE TRIAGE (OUTPATIENT)
Dept: INTERNAL MEDICINE CLINIC | Facility: CLINIC | Age: 56
End: 2021-09-28

## 2021-09-28 ENCOUNTER — PATIENT MESSAGE (OUTPATIENT)
Dept: INTERNAL MEDICINE CLINIC | Facility: CLINIC | Age: 56
End: 2021-09-28

## 2021-09-28 DIAGNOSIS — R30.0 DYSURIA: Primary | ICD-10-CM

## 2021-09-29 RX ORDER — NITROFURANTOIN 25; 75 MG/1; MG/1
100 CAPSULE ORAL 2 TIMES DAILY
Qty: 14 CAPSULE | Refills: 0 | Status: SHIPPED | OUTPATIENT
Start: 2021-09-29 | End: 2021-10-06

## 2021-09-29 NOTE — TELEPHONE ENCOUNTER
Recommend patient to complete urine and urine culture    will send antibiotic can use it after the urine and urine culture provided  Keep with good hydration-  Antibiotic-might need to be changed  Is complete

## 2021-09-29 NOTE — TELEPHONE ENCOUNTER
Action Requested: Summary for Provider     []  Critical Lab, Recommendations Needed  [x] Need Additional Advice  []   FYI    []   Need Orders  [] Need Medications Sent to Pharmacy  []  Other     SUMMARY: Patient requesting treatment for UTI please advise.

## 2021-09-29 NOTE — TELEPHONE ENCOUNTER
----- Message from Cinthia Coles sent at 9/28/2021  5:27 PM CDT -----  Regarding: UTI  Hello, I have a UTI (had it for several days and was taking AZO over the counter and did nothing).   Can you please send a prescription for something to [facility] in [facility]

## 2021-09-29 NOTE — TELEPHONE ENCOUNTER
Attempted to call patient. Phone rang for a long time, then went to busy signal.  Unable to leave message.

## 2021-10-20 ENCOUNTER — MED REC SCAN ONLY (OUTPATIENT)
Dept: INTERNAL MEDICINE CLINIC | Facility: CLINIC | Age: 56
End: 2021-10-20

## 2021-10-27 NOTE — TELEPHONE ENCOUNTER
LOV 02/06/21. RTC 3 months. NO f/u Called for first available. No answer, unable to leave message. Sent [facility] reminder, pended 30 day supply.

## 2021-10-28 RX ORDER — LEVOTHYROXINE SODIUM 0.03 MG/1
25 TABLET ORAL
Qty: 30 TABLET | Refills: 0 | Status: SHIPPED | OUTPATIENT
Start: 2021-10-28

## 2021-10-29 ENCOUNTER — HOSPITAL ENCOUNTER (OUTPATIENT)
Age: 56
Discharge: HOME | End: 2021-10-29
Payer: COMMERCIAL

## 2021-10-29 ENCOUNTER — HOSPITAL ENCOUNTER (OUTPATIENT)
Dept: ULTRASOUND IMAGING | Age: 56
Discharge: HOME | End: 2021-10-29
Payer: COMMERCIAL

## 2021-10-29 ENCOUNTER — LABORATORY ENCOUNTER (OUTPATIENT)
Dept: LAB | Age: 56
End: 2021-10-29
Payer: COMMERCIAL

## 2021-10-29 DIAGNOSIS — E55.9 VITAMIN D DEFICIENCY: ICD-10-CM

## 2021-10-29 DIAGNOSIS — R30.0 DYSURIA: ICD-10-CM

## 2021-10-29 DIAGNOSIS — R10.9 FLANK PAIN: ICD-10-CM

## 2021-10-29 DIAGNOSIS — E03.9 ACQUIRED HYPOTHYROIDISM: ICD-10-CM

## 2021-10-29 PROCEDURE — 87086 URINE CULTURE/COLONY COUNT: CPT

## 2021-10-29 PROCEDURE — 84439 ASSAY OF FREE THYROXINE: CPT

## 2021-10-29 PROCEDURE — 76770 US EXAM ABDO BACK WALL COMP: CPT | Performed by: PHYSICIAN ASSISTANT

## 2021-10-29 PROCEDURE — 81003 URINALYSIS AUTO W/O SCOPE: CPT

## 2021-10-29 PROCEDURE — 84443 ASSAY THYROID STIM HORMONE: CPT

## 2021-10-29 PROCEDURE — 36415 COLL VENOUS BLD VENIPUNCTURE: CPT

## 2021-10-29 PROCEDURE — 82306 VITAMIN D 25 HYDROXY: CPT

## 2021-10-29 PROCEDURE — 90471 IMMUNIZATION ADMIN: CPT | Performed by: EMERGENCY MEDICINE

## 2021-10-29 PROCEDURE — 90686 IIV4 VACC NO PRSV 0.5 ML IM: CPT | Performed by: EMERGENCY MEDICINE

## 2021-10-29 PROCEDURE — 76770 US EXAM ABDO BACK WALL COMP: CPT

## 2021-10-31 ENCOUNTER — PATIENT MESSAGE (OUTPATIENT)
Dept: ENDOCRINOLOGY CLINIC | Facility: CLINIC | Age: 56
End: 2021-10-31

## 2021-10-31 DIAGNOSIS — E87.1 HYPONATREMIA: Primary | ICD-10-CM

## 2021-11-01 RX ORDER — LEVOTHYROXINE SODIUM 25 MCG
TABLET ORAL
Qty: 90 TABLET | Refills: 1 | OUTPATIENT
Start: 2021-11-01

## 2021-11-01 NOTE — TELEPHONE ENCOUNTER
From: Socrates Alexandre  To: Kaycee Tristan MD  Sent: 10/31/2021 8:39 AM CDT  Subject: Question regarding VITAMIN D, 25-HYDROXY    Hello dr Aurora Cota, I didn't see a test for my sodium which is what I was worried about.

## 2021-11-05 ENCOUNTER — PATIENT MESSAGE (OUTPATIENT)
Dept: ENDOCRINOLOGY CLINIC | Facility: CLINIC | Age: 56
End: 2021-11-05

## 2021-11-05 NOTE — TELEPHONE ENCOUNTER
From: Mounika Lindsay  Sent: 11/5/2021 9:27 AM CDT  To: Adrienne Heredia Clinical Staff  Subject: Question regarding VITAMIN D, 25-HYDROXY    Please ask to make sure she includes it as this is the second time this has happened.

## 2021-11-05 NOTE — TELEPHONE ENCOUNTER
Patient notified via [employer] of sodium serum lab ordered. Patient notified that she can get this drawn at her convenience.

## 2021-11-28 ENCOUNTER — TELEPHONE (OUTPATIENT)
Dept: ENDOCRINOLOGY CLINIC | Facility: CLINIC | Age: 56
End: 2021-11-28

## 2021-11-28 DIAGNOSIS — E87.1 HYPONATREMIA: ICD-10-CM

## 2021-11-28 DIAGNOSIS — E03.9 ACQUIRED HYPOTHYROIDISM: Primary | ICD-10-CM

## 2021-11-28 NOTE — TELEPHONE ENCOUNTER
Hi!  Can we call this patient and ask her if she had stopped her biotin before checking her thyroid function tests? If she did, then I would like to increase her dose. Thank you!

## 2021-11-30 NOTE — TELEPHONE ENCOUNTER
Dr. Nicki Valenzuela -- please see patient's response to your question regarding stopping biotin. Please advise on dose increase. Thank you.

## 2021-12-02 NOTE — TELEPHONE ENCOUNTER
Dr. Dot Arias, please advise on how much patient should increase levothyroxine. She sent another message.

## 2021-12-03 RX ORDER — LEVOTHYROXINE SODIUM 0.05 MG/1
50 TABLET ORAL
Qty: 90 TABLET | Refills: 0 | Status: SHIPPED | OUTPATIENT
Start: 2021-12-03 | End: 2022-01-29

## 2021-12-03 NOTE — TELEPHONE ENCOUNTER
Hi!  Let us increase dose to 50mcg PO qday and recehcek thyroid function tests in 3 months. Thank you!

## 2022-01-29 DIAGNOSIS — E03.9 ACQUIRED HYPOTHYROIDISM: ICD-10-CM

## 2022-01-31 RX ORDER — LEVOTHYROXINE SODIUM 0.05 MG/1
50 TABLET ORAL
Qty: 90 TABLET | Refills: 0 | Status: SHIPPED | OUTPATIENT
Start: 2022-01-31 | End: 2022-05-01

## 2022-02-28 ENCOUNTER — LAB ENCOUNTER (OUTPATIENT)
Dept: LAB | Age: 57
End: 2022-02-28
Attending: INTERNAL MEDICINE
Payer: COMMERCIAL

## 2022-02-28 DIAGNOSIS — E03.9 ACQUIRED HYPOTHYROIDISM: ICD-10-CM

## 2022-02-28 DIAGNOSIS — E87.1 HYPONATREMIA: ICD-10-CM

## 2022-02-28 LAB
ANION GAP SERPL CALC-SCNC: 5 MMOL/L (ref 0–18)
BUN BLD-MCNC: 13 MG/DL (ref 7–18)
BUN/CREAT SERPL: 17.8 (ref 10–20)
CALCIUM BLD-MCNC: 9.7 MG/DL (ref 8.5–10.1)
CHLORIDE SERPL-SCNC: 98 MMOL/L (ref 98–112)
CO2 SERPL-SCNC: 30 MMOL/L (ref 21–32)
CREAT BLD-MCNC: 0.73 MG/DL
FASTING STATUS PATIENT QL REPORTED: NO
GLUCOSE BLD-MCNC: 85 MG/DL (ref 70–99)
OSMOLALITY SERPL CALC.SUM OF ELEC: 275 MOSM/KG (ref 275–295)
POTASSIUM SERPL-SCNC: 4.1 MMOL/L (ref 3.5–5.1)
SODIUM SERPL-SCNC: 133 MMOL/L (ref 136–145)
T4 FREE SERPL-MCNC: 0.9 NG/DL (ref 0.8–1.7)

## 2022-02-28 PROCEDURE — 84439 ASSAY OF FREE THYROXINE: CPT

## 2022-02-28 PROCEDURE — 36415 COLL VENOUS BLD VENIPUNCTURE: CPT

## 2022-02-28 PROCEDURE — 80048 BASIC METABOLIC PNL TOTAL CA: CPT

## 2022-02-28 PROCEDURE — 84443 ASSAY THYROID STIM HORMONE: CPT

## 2022-03-09 ENCOUNTER — TELEMEDICINE (OUTPATIENT)
Dept: ENDOCRINOLOGY CLINIC | Facility: CLINIC | Age: 57
End: 2022-03-09

## 2022-03-09 DIAGNOSIS — E87.1 HYPONATREMIA: Primary | ICD-10-CM

## 2022-03-09 DIAGNOSIS — M85.89 OSTEOPENIA OF MULTIPLE SITES: ICD-10-CM

## 2022-03-09 DIAGNOSIS — E55.9 VITAMIN D DEFICIENCY: ICD-10-CM

## 2022-03-09 DIAGNOSIS — E03.9 HYPOTHYROIDISM, UNSPECIFIED TYPE: ICD-10-CM

## 2022-03-09 DIAGNOSIS — E22.2 SIADH (SYNDROME OF INAPPROPRIATE ADH PRODUCTION) (HCC): ICD-10-CM

## 2022-03-09 PROCEDURE — 99214 OFFICE O/P EST MOD 30 MIN: CPT | Performed by: INTERNAL MEDICINE

## 2022-03-14 ENCOUNTER — PATIENT MESSAGE (OUTPATIENT)
Dept: ENDOCRINOLOGY CLINIC | Facility: CLINIC | Age: 57
End: 2022-03-14

## 2022-03-14 NOTE — TELEPHONE ENCOUNTER
From: Roman Koyanagi  To: Kaycee Figueroa MD  Sent: 3/14/2022 8:04 AM CDT  Subject: Follow Up to our last appt    Hi Dr. Ortiz Serve couple of things. I feel great during the day but at about 3:00 I get tired and have no energy. That make me because I am drinking too much liquid. ??    Also, what is the medicine you would put me on? I feel pretty sure that I will need it. Especially going into summer. I just want to know if it is a water pill and I will pee even more than I already do. I pee a lot right now!      Thanks

## 2022-03-16 ENCOUNTER — TELEPHONE (OUTPATIENT)
Dept: ENDOCRINOLOGY CLINIC | Facility: CLINIC | Age: 57
End: 2022-03-16

## 2022-03-16 NOTE — TELEPHONE ENCOUNTER
Dr. Malika Figueroa   Patient calling in regards to [facility] dtd 3/14/22 - please advise -thanks

## 2022-03-17 ENCOUNTER — PATIENT MESSAGE (OUTPATIENT)
Dept: ENDOCRINOLOGY CLINIC | Facility: CLINIC | Age: 57
End: 2022-03-17

## 2022-03-17 NOTE — TELEPHONE ENCOUNTER
Replied to patient's mychart message with notes from Dr. Cherelle Kan below. Awaiting patient response.

## 2022-03-17 NOTE — TELEPHONE ENCOUNTER
Hi!    At our last visit, we had agreed to have her first try and restrict her water intake for a week and then recheck the labs in a week. Can we do this first please? Thank you!

## 2022-03-17 NOTE — TELEPHONE ENCOUNTER
Hi!  If she has not been drinking that much water, let us test her labs now. Please explain to her that the problem that she has is one of drinking too much water and is not going to be solved by a medication. I am sorry that I gave her that hope. It should not be thought of as a quick fix. We have already worked up her fatigue and there is no endocrine cause for this. She really must speak to her primary care physician about this if she is not getting relief from anything that she is taking now. Thank you.

## 2022-04-28 ENCOUNTER — PATIENT MESSAGE (OUTPATIENT)
Dept: ENDOCRINOLOGY CLINIC | Facility: CLINIC | Age: 57
End: 2022-04-28

## 2022-04-28 RX ORDER — LEVOTHYROXINE SODIUM 50 MCG
TABLET ORAL
Qty: 90 TABLET | Refills: 0 | Status: SHIPPED | OUTPATIENT
Start: 2022-04-28

## 2022-04-29 NOTE — TELEPHONE ENCOUNTER
From: Jennie Anderson  To: Kaycee Connor MD  Sent: 4/28/2022 6:40 PM CDT  Subject: Refill    Need a refill. My provider says doctor isn't returning their call.

## 2022-05-23 ENCOUNTER — OFFICE VISIT (OUTPATIENT)
Dept: FAMILY MEDICINE CLINIC | Facility: CLINIC | Age: 57
End: 2022-05-23
Payer: COMMERCIAL

## 2022-05-23 ENCOUNTER — LAB ENCOUNTER (OUTPATIENT)
Dept: LAB | Facility: REFERENCE LAB | Age: 57
End: 2022-05-23
Attending: FAMILY MEDICINE
Payer: COMMERCIAL

## 2022-05-23 VITALS
OXYGEN SATURATION: 99 % | HEART RATE: 74 BPM | BODY MASS INDEX: 20.49 KG/M2 | HEIGHT: 63 IN | SYSTOLIC BLOOD PRESSURE: 118 MMHG | RESPIRATION RATE: 16 BRPM | WEIGHT: 115.63 LBS | DIASTOLIC BLOOD PRESSURE: 66 MMHG

## 2022-05-23 DIAGNOSIS — Z23 NEED FOR VACCINATION: ICD-10-CM

## 2022-05-23 DIAGNOSIS — K21.9 GASTROESOPHAGEAL REFLUX DISEASE, UNSPECIFIED WHETHER ESOPHAGITIS PRESENT: ICD-10-CM

## 2022-05-23 DIAGNOSIS — E87.1 HYPONATREMIA: ICD-10-CM

## 2022-05-23 DIAGNOSIS — F31.9 BIPOLAR 1 DISORDER (HCC): ICD-10-CM

## 2022-05-23 DIAGNOSIS — Z00.00 ENCOUNTER FOR ROUTINE ADULT HEALTH EXAMINATION WITHOUT ABNORMAL FINDINGS: ICD-10-CM

## 2022-05-23 DIAGNOSIS — Z12.31 SCREENING MAMMOGRAM, ENCOUNTER FOR: ICD-10-CM

## 2022-05-23 DIAGNOSIS — Z00.00 ENCOUNTER FOR ROUTINE ADULT HEALTH EXAMINATION WITHOUT ABNORMAL FINDINGS: Primary | ICD-10-CM

## 2022-05-23 LAB
ANION GAP SERPL CALC-SCNC: 7 MMOL/L (ref 0–18)
BASOPHILS # BLD AUTO: 0.04 X10(3) UL (ref 0–0.2)
BASOPHILS NFR BLD AUTO: 0.5 %
BUN BLD-MCNC: 18 MG/DL (ref 7–18)
BUN/CREAT SERPL: 26.1 (ref 10–20)
CALCIUM BLD-MCNC: 9.4 MG/DL (ref 8.5–10.1)
CHLORIDE SERPL-SCNC: 100 MMOL/L (ref 98–112)
CHOLEST SERPL-MCNC: 201 MG/DL (ref ?–200)
CO2 SERPL-SCNC: 30 MMOL/L (ref 21–32)
CREAT BLD-MCNC: 0.69 MG/DL
DEPRECATED RDW RBC AUTO: 42.4 FL (ref 35.1–46.3)
EOSINOPHIL # BLD AUTO: 0.14 X10(3) UL (ref 0–0.7)
EOSINOPHIL NFR BLD AUTO: 1.8 %
ERYTHROCYTE [DISTWIDTH] IN BLOOD BY AUTOMATED COUNT: 12.9 % (ref 11–15)
EST. AVERAGE GLUCOSE BLD GHB EST-MCNC: 108 MG/DL (ref 68–126)
FASTING PATIENT LIPID ANSWER: NO
FASTING STATUS PATIENT QL REPORTED: NO
GLUCOSE BLD-MCNC: 108 MG/DL (ref 70–99)
HBA1C MFR BLD: 5.4 % (ref ?–5.7)
HCT VFR BLD AUTO: 39.5 %
HDLC SERPL-MCNC: 106 MG/DL (ref 40–59)
HGB BLD-MCNC: 12.9 G/DL
IMM GRANULOCYTES # BLD AUTO: 0.03 X10(3) UL (ref 0–1)
IMM GRANULOCYTES NFR BLD: 0.4 %
LDLC SERPL CALC-MCNC: 84 MG/DL (ref ?–100)
LYMPHOCYTES # BLD AUTO: 1.22 X10(3) UL (ref 1–4)
LYMPHOCYTES NFR BLD AUTO: 15.3 %
MCH RBC QN AUTO: 29.1 PG (ref 26–34)
MCHC RBC AUTO-ENTMCNC: 32.7 G/DL (ref 31–37)
MCV RBC AUTO: 89 FL
MONOCYTES # BLD AUTO: 0.58 X10(3) UL (ref 0.1–1)
MONOCYTES NFR BLD AUTO: 7.3 %
NEUTROPHILS # BLD AUTO: 5.97 X10 (3) UL (ref 1.5–7.7)
NEUTROPHILS # BLD AUTO: 5.97 X10(3) UL (ref 1.5–7.7)
NEUTROPHILS NFR BLD AUTO: 74.7 %
NONHDLC SERPL-MCNC: 95 MG/DL (ref ?–130)
OSMOLALITY SERPL CALC.SUM OF ELEC: 286 MOSM/KG (ref 275–295)
PLATELET # BLD AUTO: 245 10(3)UL (ref 150–450)
POTASSIUM SERPL-SCNC: 4.3 MMOL/L (ref 3.5–5.1)
RBC # BLD AUTO: 4.44 X10(6)UL
SODIUM SERPL-SCNC: 137 MMOL/L (ref 136–145)
TRIGL SERPL-MCNC: 60 MG/DL (ref 30–149)
VLDLC SERPL CALC-MCNC: 9 MG/DL (ref 0–30)
WBC # BLD AUTO: 8 X10(3) UL (ref 4–11)

## 2022-05-23 PROCEDURE — 80048 BASIC METABOLIC PNL TOTAL CA: CPT

## 2022-05-23 PROCEDURE — 83036 HEMOGLOBIN GLYCOSYLATED A1C: CPT

## 2022-05-23 PROCEDURE — 90471 IMMUNIZATION ADMIN: CPT | Performed by: FAMILY MEDICINE

## 2022-05-23 PROCEDURE — 36415 COLL VENOUS BLD VENIPUNCTURE: CPT

## 2022-05-23 PROCEDURE — 3078F DIAST BP <80 MM HG: CPT | Performed by: FAMILY MEDICINE

## 2022-05-23 PROCEDURE — 99396 PREV VISIT EST AGE 40-64: CPT | Performed by: FAMILY MEDICINE

## 2022-05-23 PROCEDURE — 3074F SYST BP LT 130 MM HG: CPT | Performed by: FAMILY MEDICINE

## 2022-05-23 PROCEDURE — 80061 LIPID PANEL: CPT

## 2022-05-23 PROCEDURE — 85025 COMPLETE CBC W/AUTO DIFF WBC: CPT

## 2022-05-23 PROCEDURE — 90715 TDAP VACCINE 7 YRS/> IM: CPT | Performed by: FAMILY MEDICINE

## 2022-05-23 PROCEDURE — 3008F BODY MASS INDEX DOCD: CPT | Performed by: FAMILY MEDICINE

## 2022-05-23 RX ORDER — FLUTICASONE PROPIONATE 50 MCG
2 SPRAY, SUSPENSION (ML) NASAL DAILY
Qty: 18 ML | Refills: 0 | Status: SHIPPED | OUTPATIENT
Start: 2022-05-23

## 2022-05-23 RX ORDER — DULOXETIN HYDROCHLORIDE 30 MG/1
CAPSULE, DELAYED RELEASE ORAL
COMMUNITY
Start: 2022-04-26

## 2022-06-20 ENCOUNTER — PATIENT MESSAGE (OUTPATIENT)
Dept: FAMILY MEDICINE CLINIC | Facility: CLINIC | Age: 57
End: 2022-06-20

## 2022-06-20 NOTE — TELEPHONE ENCOUNTER
On 5/31/22 patient was contacted by [facility], and was given [employer]	[place]. Call attempt. Left Voicemail to call back our office. Office phone number provided with office hours. Advised check [employer] message.

## 2022-06-20 NOTE — TELEPHONE ENCOUNTER
Stella Urena RN 6/20/2022 2:23 PM CDT        ----- Message -----  From: Yady Martin  Sent: 6/20/2022 10:59 AM CDT  To: Em Rn Triage  Subject: Psychiatrist recommendation     Dr. Tori Marquez can you recommend a psychiatrist as the people recommended through [address] won't work out. I need to find a doctor ASAP.  thanks

## 2022-07-06 ENCOUNTER — HOSPITAL ENCOUNTER (OUTPATIENT)
Dept: MAMMOGRAPHY | Age: 57
Discharge: HOME | End: 2022-07-06
Attending: FAMILY MEDICINE
Payer: COMMERCIAL

## 2022-07-06 DIAGNOSIS — Z12.31 SCREENING MAMMOGRAM, ENCOUNTER FOR: ICD-10-CM

## 2022-07-06 PROCEDURE — 77063 BREAST TOMOSYNTHESIS BI: CPT | Performed by: FAMILY MEDICINE

## 2022-07-06 PROCEDURE — 77067 SCR MAMMO BI INCL CAD: CPT | Performed by: FAMILY MEDICINE

## 2022-07-09 DIAGNOSIS — E03.9 ACQUIRED HYPOTHYROIDISM: ICD-10-CM

## 2022-07-13 RX ORDER — LEVOTHYROXINE SODIUM 50 MCG
TABLET ORAL
Qty: 90 TABLET | Refills: 0 | Status: SHIPPED | OUTPATIENT
Start: 2022-07-13

## 2022-07-13 NOTE — TELEPHONE ENCOUNTER
LOV Tele: 3/9/22    RTC: 6 Months    FU: No FU Appt Scheduled     Last Refill: 4/28/22    3 Month Supply Pending

## 2022-08-01 DIAGNOSIS — E03.9 ACQUIRED HYPOTHYROIDISM: ICD-10-CM

## 2022-08-02 RX ORDER — FLUTICASONE PROPIONATE 50 MCG
2 SPRAY, SUSPENSION (ML) NASAL DAILY
Qty: 18 ML | Refills: 5 | Status: SHIPPED | OUTPATIENT
Start: 2022-08-02 | End: 2022-08-08

## 2022-08-02 NOTE — TELEPHONE ENCOUNTER
LOV 3/9/2022    RTC in 6 months    F/U not scheduled; MyChart sent. 30 day supply with 0 refills pending. Approve if appropriate.

## 2022-08-03 RX ORDER — LEVOTHYROXINE SODIUM 50 MCG
TABLET ORAL
Qty: 30 TABLET | Refills: 0 | Status: SHIPPED | OUTPATIENT
Start: 2022-08-03

## 2022-08-08 RX ORDER — FLUTICASONE PROPIONATE 50 MCG
2 SPRAY, SUSPENSION (ML) NASAL DAILY
Qty: 3 EACH | Refills: 3 | Status: SHIPPED | OUTPATIENT
Start: 2022-08-08

## 2022-08-09 ENCOUNTER — PATIENT MESSAGE (OUTPATIENT)
Dept: ENDOCRINOLOGY CLINIC | Facility: CLINIC | Age: 57
End: 2022-08-09

## 2022-08-09 DIAGNOSIS — M85.89 OSTEOPENIA OF MULTIPLE SITES: ICD-10-CM

## 2022-08-09 DIAGNOSIS — E22.2 SIADH (SYNDROME OF INAPPROPRIATE ADH PRODUCTION) (HCC): ICD-10-CM

## 2022-08-09 DIAGNOSIS — E03.9 ACQUIRED HYPOTHYROIDISM: Primary | ICD-10-CM

## 2022-08-09 DIAGNOSIS — E55.9 VITAMIN D DEFICIENCY: ICD-10-CM

## 2022-08-09 DIAGNOSIS — E87.1 HYPONATREMIA: ICD-10-CM

## 2022-08-09 NOTE — TELEPHONE ENCOUNTER
Dr Alise Connor,    Please advise. Pt last seen 3/9/2022 for telemedicine . Last seen in person 11/4/2020.

## 2022-08-09 NOTE — TELEPHONE ENCOUNTER
From: Aram Weinberg  To: Kaycee Harding MD  Sent: 8/9/2022 8:09 AM CDT  Subject: Sept 14 appt    Can I do a virtual for my appt please?

## 2022-08-09 NOTE — TELEPHONE ENCOUNTER
Hi!    This is fine. I will order labs for her. Please ask her to have these done before her appt. Thank you!

## 2022-08-16 ENCOUNTER — TELEPHONE (OUTPATIENT)
Dept: ENDOCRINOLOGY CLINIC | Facility: CLINIC | Age: 57
End: 2022-08-16

## 2022-08-16 ENCOUNTER — PATIENT MESSAGE (OUTPATIENT)
Dept: ENDOCRINOLOGY CLINIC | Facility: CLINIC | Age: 57
End: 2022-08-16

## 2022-08-16 NOTE — TELEPHONE ENCOUNTER
Dr. Brit Roberts to patient to advise ok to get labs done now d/t symptom below - patient stated she will complete labs later this week   Patient asking if there are any additional labs that need to be added (TSH, T4, vitamin D and BMP ordered)  Please advise -thanks

## 2022-08-16 NOTE — TELEPHONE ENCOUNTER
Hi!    Please let patient know that I do not know that much about hair loss, but I can give her a list of dermatologists/ hair loss specialists who may be able to help? Thank you!

## 2022-08-18 NOTE — TELEPHONE ENCOUNTER
Hi!    Please give the following list:    Dr. Tri Velez [facility]  [phone]    Dr. Marialuisa Hassan  [email or web address]    Dr. Dez Ochoa  [phone]  [email or web address]    Dr. Dawna Menchaca  [phone]  [email or web address]    Dr. Marisela Conteh  [phone]  [email or web address]    Dr. Violeta Byrne  [phone]  [email or web address]    Dr. Willie Retana  [phone]  [email or web address]      Thank you!

## 2022-08-25 ENCOUNTER — LAB ENCOUNTER (OUTPATIENT)
Dept: LAB | Facility: REFERENCE LAB | Age: 57
End: 2022-08-25
Attending: FAMILY MEDICINE
Payer: COMMERCIAL

## 2022-08-25 DIAGNOSIS — E03.9 ACQUIRED HYPOTHYROIDISM: ICD-10-CM

## 2022-08-25 DIAGNOSIS — M85.89 OSTEOPENIA OF MULTIPLE SITES: ICD-10-CM

## 2022-08-25 DIAGNOSIS — E22.2 SIADH (SYNDROME OF INAPPROPRIATE ADH PRODUCTION) (HCC): ICD-10-CM

## 2022-08-25 DIAGNOSIS — E55.9 VITAMIN D DEFICIENCY: ICD-10-CM

## 2022-08-25 DIAGNOSIS — L65.9 HAIR LOSS: ICD-10-CM

## 2022-08-25 DIAGNOSIS — E87.1 HYPONATREMIA: ICD-10-CM

## 2022-08-25 LAB
ANION GAP SERPL CALC-SCNC: 4 MMOL/L (ref 0–18)
BUN BLD-MCNC: 14 MG/DL (ref 7–18)
BUN/CREAT SERPL: 20.6 (ref 10–20)
CALCIUM BLD-MCNC: 8.7 MG/DL (ref 8.5–10.1)
CHLORIDE SERPL-SCNC: 98 MMOL/L (ref 98–112)
CO2 SERPL-SCNC: 29 MMOL/L (ref 21–32)
CREAT BLD-MCNC: 0.68 MG/DL
DEPRECATED HBV CORE AB SER IA-ACNC: 57.3 NG/ML
FASTING STATUS PATIENT QL REPORTED: YES
GFR SERPLBLD BASED ON 1.73 SQ M-ARVRAT: 102 ML/MIN/1.73M2 (ref 60–?)
GLUCOSE BLD-MCNC: 97 MG/DL (ref 70–99)
IRON SATN MFR SERPL: 24 %
IRON SERPL-MCNC: 65 UG/DL
OSMOLALITY SERPL CALC.SUM OF ELEC: 272 MOSM/KG (ref 275–295)
POTASSIUM SERPL-SCNC: 4 MMOL/L (ref 3.5–5.1)
SODIUM SERPL-SCNC: 131 MMOL/L (ref 136–145)
T4 FREE SERPL-MCNC: 1 NG/DL (ref 0.8–1.7)
TIBC SERPL-MCNC: 267 UG/DL (ref 240–450)
TRANSFERRIN SERPL-MCNC: 179 MG/DL (ref 200–360)
TSI SER-ACNC: 0.24 MIU/ML (ref 0.36–3.74)
VIT D+METAB SERPL-MCNC: 50.9 NG/ML (ref 30–100)

## 2022-08-25 PROCEDURE — 82306 VITAMIN D 25 HYDROXY: CPT

## 2022-08-25 PROCEDURE — 84466 ASSAY OF TRANSFERRIN: CPT

## 2022-08-25 PROCEDURE — 84439 ASSAY OF FREE THYROXINE: CPT

## 2022-08-25 PROCEDURE — 84443 ASSAY THYROID STIM HORMONE: CPT

## 2022-08-25 PROCEDURE — 36415 COLL VENOUS BLD VENIPUNCTURE: CPT

## 2022-08-25 PROCEDURE — 82728 ASSAY OF FERRITIN: CPT

## 2022-08-25 PROCEDURE — 80048 BASIC METABOLIC PNL TOTAL CA: CPT

## 2022-08-25 PROCEDURE — 83540 ASSAY OF IRON: CPT

## 2022-08-26 ENCOUNTER — PATIENT MESSAGE (OUTPATIENT)
Dept: ENDOCRINOLOGY CLINIC | Facility: CLINIC | Age: 57
End: 2022-08-26

## 2022-08-26 DIAGNOSIS — E03.9 ACQUIRED HYPOTHYROIDISM: Primary | ICD-10-CM

## 2022-08-26 NOTE — TELEPHONE ENCOUNTER
Dr. Alise Connor,    Please see patient message    Component      Latest Ref Rng & Units 8/25/2022   Glucose      70 - 99 mg/dL 97   Sodium      136 - 145 mmol/L 131 (L)   Potassium      3.5 - 5.1 mmol/L 4.0   Chloride      98 - 112 mmol/L 98   Carbon Dioxide, Total      21.0 - 32.0 mmol/L 29.0   ANION GAP      0 - 18 mmol/L 4   BUN      7 - 18 mg/dL 14   CREATININE      0.55 - 1.02 mg/dL 0.68   BUN/CREATININE RATIO      10.0 - 20.0 20.6 (H)   CALCIUM      8.5 - 10.1 mg/dL 8.7   CALCULATED OSMOLALITY      275 - 295 mOsm/kg 272 (L)   eGFR-Cr      >=60 mL/min/1.73m2 102   Patient Fasting for BMP?        Yes   T4,Free (Direct)      0.8 - 1.7 ng/dL 1.0   TSH      0.358 - 3.740 mIU/mL 0.236 (L)   VITAMIN D, 25-OH, TOTAL      30.0 - 100.0 ng/mL 50.9     Patient is currently taking Synthroid 50 mcg daily

## 2022-08-26 NOTE — TELEPHONE ENCOUNTER
Hi!  Please let patient know that it is very important to discuss these in person at an appointment face to face, and when we discuss things over MyChart, important information gets lost or not conveyed. However, since she has asked, this is what I have to offer in the way of management:    One thing that I have noted is that since March, she has been getting Synthroid instead of levothyroxine, and this could be the reason for the difference in TSH. If she is going to continue on Synthroid, rather than levothyroxine, then we should recheck the TSH in one month and confirm that this is actually the case, that her TSH is lower than the lower limit of normal.     If it is still low, we should decrease the dose of Synthroid until TSH is in the normal range. In addition, she should consult with one of the dermatologists that I have given her, because I am not a dermatologist, and normalization of TSH is not a quick fix for hair loss. Thank you!

## 2022-08-29 NOTE — TELEPHONE ENCOUNTER
Hi!  I did not ask her to cancel appt, I just asked her to recheck the TSH in 1 month to confirm whether this was a real result. That is all. I think it is important to confirm this finding. She does not have to cancel the appt. Thank you.

## 2022-08-31 ENCOUNTER — PATIENT MESSAGE (OUTPATIENT)
Dept: ENDOCRINOLOGY CLINIC | Facility: CLINIC | Age: 57
End: 2022-08-31

## 2022-08-31 NOTE — TELEPHONE ENCOUNTER
Replied to patient's [employer] message with notes outlined in Dr. Osiel Mosley message below.

## 2022-09-01 NOTE — TELEPHONE ENCOUNTER
From: Ray Lorenzana  Sent: 8/31/2022 1:39 PM CDT  To: Adrienne Heredia Clinical Staff  Subject: hair loss    I did not cancel my appt. My next appt is Sept 14th. I saw the dermatologist already. Should I retest before my next appt or after?

## 2022-09-14 ENCOUNTER — TELEPHONE (OUTPATIENT)
Dept: ENDOCRINOLOGY CLINIC | Facility: CLINIC | Age: 57
End: 2022-09-14

## 2022-09-14 NOTE — TELEPHONE ENCOUNTER
Hi!    I called the patient and explained my note from before. We will proceed with plan outlined before. Thank you.

## 2022-09-23 DIAGNOSIS — E03.9 ACQUIRED HYPOTHYROIDISM: ICD-10-CM

## 2022-09-23 NOTE — TELEPHONE ENCOUNTER
Pt called in to get refill 3 months per insurance SYNTHROID 50 MCG Oral Tab pt would like the generic version please follow up

## 2022-09-25 RX ORDER — LEVOTHYROXINE SODIUM 0.05 MG/1
50 TABLET ORAL
Qty: 90 TABLET | Refills: 0 | Status: SHIPPED | OUTPATIENT
Start: 2022-09-25

## 2022-09-26 ENCOUNTER — TELEMEDICINE (OUTPATIENT)
Dept: FAMILY MEDICINE CLINIC | Facility: CLINIC | Age: 57
End: 2022-09-26

## 2022-09-26 ENCOUNTER — PATIENT MESSAGE (OUTPATIENT)
Dept: ENDOCRINOLOGY CLINIC | Facility: CLINIC | Age: 57
End: 2022-09-26

## 2022-09-26 VITALS — BODY MASS INDEX: 20 KG/M2 | HEIGHT: 63 IN

## 2022-09-26 DIAGNOSIS — M85.89 OSTEOPENIA OF MULTIPLE SITES: ICD-10-CM

## 2022-09-26 DIAGNOSIS — L65.9 HAIR LOSS: Primary | ICD-10-CM

## 2022-09-26 DIAGNOSIS — E03.9 HYPOTHYROIDISM, UNSPECIFIED TYPE: ICD-10-CM

## 2022-09-26 PROCEDURE — 99213 OFFICE O/P EST LOW 20 MIN: CPT | Performed by: FAMILY MEDICINE

## 2022-09-26 NOTE — TELEPHONE ENCOUNTER
Dr. Johnnie Madera    Please see patient message. Patient saw Abdulaziz Huerta DO today, please see notes.

## 2022-09-26 NOTE — TELEPHONE ENCOUNTER
Replied to patient's mychart message with notes and recommendations outlined in Dr. Jules message below.

## 2022-09-26 NOTE — TELEPHONE ENCOUNTER
Hi!  Please let patient know that we have discussed this in great detail on multiple occasions. I have given her a list of dermatologists that specialize in hair loss. My recommendations currently would be to consult with one of these dermatologists     If there is a doubt that the thyroid function is somehow involved, it is a very good idea, to decrease the current dose of levothyroxine 50mcg PO qday to 25mcg PO qday by breaking the tablets that she currently has in half. It may take some time for her to see any difference, perhaps about 3 months. Please let her know that I have no experience in treating hair loss, and so it may be better if she were to follow up with her PCP henceforth. Thank you!

## 2022-09-26 NOTE — TELEPHONE ENCOUNTER
From: Richi Ross  To: Kaycee Mcclain MD  Sent: 9/26/2022 2:54 PM CDT  Subject: Hair loss    Hello I had an appointment with my PC Dr who after looking at my bloodtests, etc thought my hair loss might be because my thyroid medicine might be too high. It's been at least 6 months since I've noticed I've been losing hair. That seems like the time when I switched medicine to higher dose. I'm losing so much hair I'm taking more Xanax. I'm very concerned.

## 2022-09-29 ENCOUNTER — LABORATORY ENCOUNTER (OUTPATIENT)
Dept: LAB | Age: 57
End: 2022-09-29
Attending: INTERNAL MEDICINE
Payer: COMMERCIAL

## 2022-09-29 DIAGNOSIS — E03.9 ACQUIRED HYPOTHYROIDISM: ICD-10-CM

## 2022-09-29 LAB
T4 FREE SERPL-MCNC: 1.1 NG/DL (ref 0.8–1.7)
TSI SER-ACNC: 0.93 MIU/ML (ref 0.36–3.74)

## 2022-09-29 PROCEDURE — 84439 ASSAY OF FREE THYROXINE: CPT

## 2022-09-29 PROCEDURE — 84443 ASSAY THYROID STIM HORMONE: CPT

## 2022-09-29 PROCEDURE — 36415 COLL VENOUS BLD VENIPUNCTURE: CPT

## 2022-10-02 ENCOUNTER — PATIENT MESSAGE (OUTPATIENT)
Dept: ENDOCRINOLOGY CLINIC | Facility: CLINIC | Age: 57
End: 2022-10-02

## 2022-10-02 ENCOUNTER — PATIENT MESSAGE (OUTPATIENT)
Dept: FAMILY MEDICINE CLINIC | Facility: CLINIC | Age: 57
End: 2022-10-02

## 2022-10-03 NOTE — TELEPHONE ENCOUNTER
Dr. Martin Mendez, please see [employer] message and advise. Thanks.     Telemedicine visit: 9/26/22

## 2022-10-03 NOTE — TELEPHONE ENCOUNTER
please see pt Mychart message below. Ok to inform pt what you are recommending the Madeleine 500 mg is over the counter?

## 2022-10-03 NOTE — TELEPHONE ENCOUNTER
Hi!    Please ask her to continue taking the 50mcg tablets 5 days a week, skipping 2 days. It will work out to a dose that is in between. Thank you.

## 2022-10-03 NOTE — TELEPHONE ENCOUNTER
From: Glorietta Lennox  To: Berdine Phoenix, DO  Sent: 10/2/2022 3:38 PM CDT  Subject: Latest appt question    Hi Dr. Caitlin Gomes,    I am sorry one thing I wanted to talk to you about when we had our last visit was menopause. I am having massive hot flashes and cold spells. Would it help me to be on an estrogen patch or something else? I have lowered my dose of thyroid meds and I feel more manic but way better. I think I need a higher dose. I do think I would like your recommendation again (someone in network) for an endocrinologist please. I want to get to a point where I feel good and stable. Look forward to hearing from you.     Zehra Tsai

## 2022-10-03 NOTE — TELEPHONE ENCOUNTER
From: Álvaro Calle  To: Kaycee Perez MD  Sent: 10/2/2022 3:39 PM CDT  Subject: Meds    Hello,    I have lowered my meds and I have energy again and feel good but almost manic with too much energy. Is it possible I need in the middle of .25 and .50? I wondered if I could do 3/4 of a pill for a little while and see how I feel.     look forward to hearing from you. Thank you.

## 2022-11-17 NOTE — TELEPHONE ENCOUNTER
LOV:  9/14/2022    RTC: 6 months     F/U: not scheduled at this time.      Dr Napoleon Bonilla-- orders pending, approve if appropriate

## 2022-11-18 NOTE — TELEPHONE ENCOUNTER
Patient confirmed she is taking levothyroxine and is currently using 25mcg. States to send the 50mcg dose in case she needs to increase dose again, she is okay with cutting the tabs in half. Routed to provider.

## 2022-11-18 NOTE — TELEPHONE ENCOUNTER
Hi!  Please ask patient if she is taking 50mcg or 25mcg? And is she taking the Synthroid or the levothyroxine?

## 2022-11-21 RX ORDER — LEVOTHYROXINE SODIUM 50 MCG
TABLET ORAL
Qty: 90 TABLET | Refills: 0 | Status: SHIPPED | OUTPATIENT
Start: 2022-11-21

## 2022-11-28 ENCOUNTER — OFFICE VISIT (OUTPATIENT)
Dept: OTOLARYNGOLOGY | Facility: CLINIC | Age: 57
End: 2022-11-28
Payer: COMMERCIAL

## 2022-11-28 ENCOUNTER — TELEPHONE (OUTPATIENT)
Dept: OTOLARYNGOLOGY | Facility: CLINIC | Age: 57
End: 2022-11-28

## 2022-11-28 VITALS — BODY MASS INDEX: 20.37 KG/M2 | HEIGHT: 63 IN | WEIGHT: 115 LBS

## 2022-11-28 DIAGNOSIS — K21.9 GASTROESOPHAGEAL REFLUX DISEASE, UNSPECIFIED WHETHER ESOPHAGITIS PRESENT: ICD-10-CM

## 2022-11-28 DIAGNOSIS — R43.0 ANOSMIA: ICD-10-CM

## 2022-11-28 DIAGNOSIS — H91.93 DECREASED HEARING OF BOTH EARS: Primary | ICD-10-CM

## 2022-11-28 PROCEDURE — 99204 OFFICE O/P NEW MOD 45 MIN: CPT | Performed by: SPECIALIST

## 2022-11-28 PROCEDURE — 3008F BODY MASS INDEX DOCD: CPT | Performed by: SPECIALIST

## 2022-11-28 RX ORDER — TRIAMCINOLONE ACETONIDE 1 MG/ML
LOTION TOPICAL
COMMUNITY
Start: 2022-11-23

## 2022-11-28 RX ORDER — FAMOTIDINE 40 MG/1
40 TABLET, FILM COATED ORAL DAILY
Qty: 90 TABLET | Refills: 3 | Status: SHIPPED | OUTPATIENT
Start: 2022-11-28

## 2022-11-28 RX ORDER — KETOCONAZOLE 20 MG/ML
SHAMPOO TOPICAL
COMMUNITY
Start: 2022-11-23

## 2022-11-28 RX ORDER — FAMOTIDINE 40 MG/1
40 TABLET, FILM COATED ORAL NIGHTLY
Qty: 30 TABLET | Refills: 5 | Status: SHIPPED | OUTPATIENT
Start: 2022-11-28 | End: 2022-11-28 | Stop reason: CLARIF

## 2022-11-28 RX ORDER — DULOXETIN HYDROCHLORIDE 20 MG/1
CAPSULE, DELAYED RELEASE ORAL
COMMUNITY
Start: 2022-11-17

## 2022-11-28 NOTE — PATIENT INSTRUCTIONS
Cerumen was fully cleaned from your ears left greater than right. I did order an audiogram to better understand her underlying hearing loss. Your smell seems to be more diminished than completely absent. We discussed trying a B complex vitamin, 3000 mg of omega-3 fatty acids, and 50 mcg of zinc.  We also discussed aromatherapy. For your reflux disease, I changed you from Prevacid to famotidine 1 pill at bedtime. I also gave you the information for Dr. Juan Liang for an EGD for better evaluation of your reflux disease. You are also given an antireflux diet. No greasy foods, spicy foods, chocolate, caffeine, alcohol, spearmint, peppermint, lemon, lime, orange, grapefruit, tomatoes.   Don't eat 2 hours before bedtime  Elevate the head of bed WDL

## 2022-11-28 NOTE — TELEPHONE ENCOUNTER
Pt is requesting to get her Rx sent to the correct pharm which is [facility] and it must be for 3 month supply. Pt states that she does not want the Rx sent to the regular pharm. Pt states that she was seen today. I tried to reach RN at [facility] and [facility] but no answer.

## 2022-11-29 ENCOUNTER — TELEMEDICINE (OUTPATIENT)
Dept: FAMILY MEDICINE CLINIC | Facility: CLINIC | Age: 57
End: 2022-11-29
Payer: COMMERCIAL

## 2022-11-29 DIAGNOSIS — L65.9 HAIR LOSS: ICD-10-CM

## 2022-11-29 DIAGNOSIS — E03.9 HYPOTHYROIDISM, UNSPECIFIED TYPE: ICD-10-CM

## 2022-11-29 DIAGNOSIS — N95.9 POSTMENOPAUSAL SYMPTOMS: Primary | ICD-10-CM

## 2022-11-29 PROCEDURE — 99214 OFFICE O/P EST MOD 30 MIN: CPT | Performed by: FAMILY MEDICINE

## 2022-11-29 RX ORDER — LEVOTHYROXINE SODIUM 0.05 MG/1
25 TABLET ORAL
Qty: 90 TABLET | Refills: 0 | COMMUNITY
Start: 2022-11-29

## 2022-11-29 RX ORDER — ESTRADIOL 10 UG/1
INSERT VAGINAL
Qty: 90 TABLET | Refills: 0 | Status: SHIPPED | OUTPATIENT
Start: 2022-11-29 | End: 2023-11-29

## 2022-11-30 ENCOUNTER — TELEPHONE (OUTPATIENT)
Dept: FAMILY MEDICINE CLINIC | Facility: CLINIC | Age: 57
End: 2022-11-30

## 2022-11-30 RX ORDER — NITROFURANTOIN 25; 75 MG/1; MG/1
100 CAPSULE ORAL 2 TIMES DAILY
Qty: 10 CAPSULE | Refills: 0 | Status: SHIPPED | OUTPATIENT
Start: 2022-11-30 | End: 2022-12-05

## 2022-11-30 NOTE — TELEPHONE ENCOUNTER
I would strongly encourage a urine culture first since she has none on file and in case her symptoms do not resolve with an antibiotic, but if this is not possible I can send Nitrofurantoin now.

## 2022-11-30 NOTE — TELEPHONE ENCOUNTER
Please call patient regarding symptoms from [address] [place] [address] [address]. Last OV 11/29/22 (video visit)    From: Aram Weinberg  To: Urbano Angel DO  Sent: 11/30/2022  8:07 AM CST  Subject: Visit yesterday    Feliberto Vu,  I can't believe this, but I woke up with a UTI (I used a vibrator at home recently - that's where I got it). Can you please prescribe an antibiotic? I am dying right now. I would appreciate not having to come in as I know how I got it. Too bad I didn't get it before our appt.     Thanks,     Tanvir Burrows

## 2022-11-30 NOTE — TELEPHONE ENCOUNTER
Verified name and . Patient states that urinary symptoms of dysuria, urinary urgency and urinary frequency started this morning. She denies hematuria or fevers. She is requesting a prescription for antibiotics to treat a urinary tract infection. She states \"I know what I have. I just had an appointment yesterday. I cannot come in because I am at work. \"    Please advise and thank you.

## 2022-12-08 ENCOUNTER — PATIENT MESSAGE (OUTPATIENT)
Dept: FAMILY MEDICINE CLINIC | Facility: CLINIC | Age: 57
End: 2022-12-08

## 2022-12-10 RX ORDER — FLUTICASONE PROPIONATE 50 MCG
2 SPRAY, SUSPENSION (ML) NASAL DAILY
Qty: 3 EACH | Refills: 1 | Status: SHIPPED | OUTPATIENT
Start: 2022-12-10

## 2022-12-10 NOTE — TELEPHONE ENCOUNTER
Chris Ash RN 12/10/2022 8:23 AM CST        ----- Message -----  From: Rosales Lan  Sent: 12/8/2022 7:49 PM CST  To: Adrienne Rn Triage  Subject: Estrogen     Hello Dr Joni Connolly. My insurance company adjusted the medication to what they thought I would need. Which means I won't have any extra. They just gave me for 2 weeks or whatever it was. So I will need a refill earlier then you thought. By the way I haven't had a hot Flash and I actually haven't been cold for the 1st time in my life. So far so good. I forgot to mention at one of my last appointments I need refill of the nose spray. A new script, as the old one is only for 1 month.  I need 3 months mail order [facility].

## 2022-12-14 ENCOUNTER — OFFICE VISIT (OUTPATIENT)
Dept: AUDIOLOGY | Facility: CLINIC | Age: 57
End: 2022-12-14
Payer: COMMERCIAL

## 2022-12-14 DIAGNOSIS — H90.3 ASYMMETRICAL SENSORINEURAL HEARING LOSS: Primary | ICD-10-CM

## 2022-12-14 PROCEDURE — 92567 TYMPANOMETRY: CPT | Performed by: AUDIOLOGIST

## 2022-12-14 PROCEDURE — 92557 COMPREHENSIVE HEARING TEST: CPT | Performed by: AUDIOLOGIST

## 2022-12-15 ENCOUNTER — TELEPHONE (OUTPATIENT)
Dept: OTOLARYNGOLOGY | Facility: CLINIC | Age: 57
End: 2022-12-15

## 2022-12-15 DIAGNOSIS — H93.12 LEFT-SIDED TINNITUS: ICD-10-CM

## 2022-12-15 DIAGNOSIS — H90.3 ASYMMETRIC SNHL (SENSORINEURAL HEARING LOSS): Primary | ICD-10-CM

## 2022-12-15 NOTE — TELEPHONE ENCOUNTER
Has new asymmetric hearing loss in the left ear, we will get MRI of the internal auditory canals to rule out retrocochlear disease. Reviewed audiogram from 2018 and hearing was symmetric at that point in time. Patient also to consider hearing aids on the left or both ears.

## 2022-12-16 ENCOUNTER — TELEPHONE (OUTPATIENT)
Dept: ENDOCRINOLOGY CLINIC | Facility: CLINIC | Age: 57
End: 2022-12-16

## 2022-12-16 DIAGNOSIS — E22.2 SIADH (SYNDROME OF INAPPROPRIATE ADH PRODUCTION) (HCC): ICD-10-CM

## 2022-12-16 DIAGNOSIS — E55.9 VITAMIN D DEFICIENCY: ICD-10-CM

## 2022-12-16 DIAGNOSIS — E03.9 ACQUIRED HYPOTHYROIDISM: Primary | ICD-10-CM

## 2022-12-24 ENCOUNTER — NURSE TRIAGE (OUTPATIENT)
Dept: FAMILY MEDICINE CLINIC | Facility: CLINIC | Age: 57
End: 2022-12-24

## 2022-12-24 LAB — AMB EXT COVID-19 RESULT: DETECTED

## 2022-12-24 NOTE — TELEPHONE ENCOUNTER
Called patient, verified Name and . Relayed that she will be needed to be seen on VV in order to get Paxlovid. She is willing to wait and see Dr. Sara Bee on . Dr. Sara Bee kindly advise if okay to add patient on  for VV.

## 2022-12-26 ENCOUNTER — TELEPHONE (OUTPATIENT)
Dept: FAMILY MEDICINE CLINIC | Facility: CLINIC | Age: 57
End: 2022-12-26

## 2022-12-27 NOTE — TELEPHONE ENCOUNTER
No need for video visit as she has no qualifying diagnoses to receive Paxlovid. Continue supportive care.

## 2022-12-27 NOTE — TELEPHONE ENCOUNTER
I inadvertently sent a message back to the nurse triage pool and can not addend it, but she DOES qualify for Paxlovid based on her age >47. She would, however, need to be able to hold her Trazodone while taking Paxlovid due to a drug-drug interaction. If she is unable to hold her Trazodone I would not recommend taking it.

## 2023-01-09 ENCOUNTER — TELEPHONE (OUTPATIENT)
Dept: OTOLARYNGOLOGY | Facility: CLINIC | Age: 58
End: 2023-01-09

## 2023-01-10 ENCOUNTER — LAB ENCOUNTER (OUTPATIENT)
Dept: LAB | Age: 58
End: 2023-01-10
Attending: INTERNAL MEDICINE
Payer: COMMERCIAL

## 2023-01-10 DIAGNOSIS — E55.9 VITAMIN D DEFICIENCY: ICD-10-CM

## 2023-01-10 DIAGNOSIS — E03.9 ACQUIRED HYPOTHYROIDISM: ICD-10-CM

## 2023-01-10 DIAGNOSIS — E22.2 SIADH (SYNDROME OF INAPPROPRIATE ADH PRODUCTION) (HCC): ICD-10-CM

## 2023-01-10 LAB
ANION GAP SERPL CALC-SCNC: 8 MMOL/L (ref 0–18)
BUN BLD-MCNC: 14 MG/DL (ref 7–18)
BUN/CREAT SERPL: 19.2 (ref 10–20)
CALCIUM BLD-MCNC: 9.6 MG/DL (ref 8.5–10.1)
CHLORIDE SERPL-SCNC: 97 MMOL/L (ref 98–112)
CO2 SERPL-SCNC: 28 MMOL/L (ref 21–32)
CREAT BLD-MCNC: 0.73 MG/DL
FASTING STATUS PATIENT QL REPORTED: NO
GFR SERPLBLD BASED ON 1.73 SQ M-ARVRAT: 96 ML/MIN/1.73M2 (ref 60–?)
GLUCOSE BLD-MCNC: 107 MG/DL (ref 70–99)
OSMOLALITY SERPL CALC.SUM OF ELEC: 277 MOSM/KG (ref 275–295)
POTASSIUM SERPL-SCNC: 4 MMOL/L (ref 3.5–5.1)
SODIUM SERPL-SCNC: 133 MMOL/L (ref 136–145)
T4 FREE SERPL-MCNC: 1 NG/DL (ref 0.8–1.7)
TSI SER-ACNC: 0.82 MIU/ML (ref 0.36–3.74)
VIT D+METAB SERPL-MCNC: 48.2 NG/ML (ref 30–100)

## 2023-01-10 PROCEDURE — 84443 ASSAY THYROID STIM HORMONE: CPT

## 2023-01-10 PROCEDURE — 36415 COLL VENOUS BLD VENIPUNCTURE: CPT

## 2023-01-10 PROCEDURE — 82306 VITAMIN D 25 HYDROXY: CPT

## 2023-01-10 PROCEDURE — 80048 BASIC METABOLIC PNL TOTAL CA: CPT

## 2023-01-10 PROCEDURE — 84439 ASSAY OF FREE THYROXINE: CPT

## 2023-01-24 ENCOUNTER — PATIENT MESSAGE (OUTPATIENT)
Dept: OTOLARYNGOLOGY | Facility: CLINIC | Age: 58
End: 2023-01-24

## 2023-01-25 ENCOUNTER — TELEPHONE (OUTPATIENT)
Facility: CLINIC | Age: 58
End: 2023-01-25

## 2023-01-25 ENCOUNTER — TELEPHONE (OUTPATIENT)
Dept: OTOLARYNGOLOGY | Facility: CLINIC | Age: 58
End: 2023-01-25

## 2023-01-25 DIAGNOSIS — R90.89 ABNORMAL BRAIN MRI: Primary | ICD-10-CM

## 2023-01-25 NOTE — TELEPHONE ENCOUNTER
Patient called. She was told by Dr. Reyes Young office that she may have MS based off recent MRI done at [facility] and Dr. Jaguar Sharp office needs to advise her from here. Spoke to [facility] from Dr. Reyes Young office. She will fax MRI results to [phone]. [facility] OP Clinical Staff- please watch for MRI results.

## 2023-01-25 NOTE — TELEPHONE ENCOUNTER
From: Laurent Duran  To: Pooja Araujo MD  Sent: 1/24/2023 5:48 PM CST  Subject: MRI - Chay Patel    I am just following up as I have not heard back about my MRI. I had it done at a facility near my house to save money. Please advise results.

## 2023-01-25 NOTE — TELEPHONE ENCOUNTER
Called patient after reviewing her MRI and let her know that the findings on her MRI are nonspecific and very unlikely to be due to [address] 88 as she has no symptoms whatsoever consistent with MS. Did recommend she schedule a visit with a neurologist and referral given, but more out of precaution. Also states she has had a lack of appetite recently and is losing a little weight unintentionally. Thyroid level recently normal and feeling no other symptoms. Up to date with all cancer screenings and recent labs normal. Advised monitoring for the next few weeks and if persisting or worsening will schedule visit.

## 2023-01-25 NOTE — TELEPHONE ENCOUNTER
Left a message for the patient. Received a copy of the MRI report not disc. Shows either small white matter changes versus demyelinating process (such as multiple sclerosis). Encouraged to speak with Dr. Tori Marquez. No sinus or mastoid disease on the films.

## 2023-01-29 ENCOUNTER — PATIENT MESSAGE (OUTPATIENT)
Facility: CLINIC | Age: 58
End: 2023-01-29

## 2023-02-07 ENCOUNTER — TELEPHONE (OUTPATIENT)
Dept: ENDOCRINOLOGY CLINIC | Facility: CLINIC | Age: 58
End: 2023-02-07

## 2023-02-07 NOTE — TELEPHONE ENCOUNTER
levothyroxine (SYNTHROID) 50 MCG Oral Tab, Take 0.5 tablets (25 mcg total) by mouth before breakfast., Disp: 90 tablet, Rfl: 0    Reason for request:   ALTERNATIVE REQUESTED     Pharmacy Comments:   ALTERNATIVE REQUESTED: synthroid is non formulary, do you want to try a PA or send alt?  Thanks

## 2023-02-08 NOTE — TELEPHONE ENCOUNTER
Medication  CGM  pump supply Requested: SYNTHROID 50 MCG Oral Tab    Sig: Take 0.5 tablets (25 mcg total) by mouth before breakfast    DX Code: E03.9                                       Case Number/Pending Ref#:

## 2023-02-10 NOTE — TELEPHONE ENCOUNTER
Endo staff, please clarify, prescription written 1/23/2023 is for Synthroid 25mcg tablets JOSE LUIS. Message below asking for levothyroxine (synthroid) 50mcg and to take 1/2 tab, prescription dated 11/29/2022. Which do you want the pa to be done for? Thank you.

## 2023-02-15 RX ORDER — FAMOTIDINE 40 MG/1
40 TABLET, FILM COATED ORAL DAILY
Qty: 90 TABLET | Refills: 3 | Status: CANCELLED | OUTPATIENT
Start: 2023-02-15

## 2023-02-16 NOTE — TELEPHONE ENCOUNTER
[facility] faxed over the determination for Synthroid 25mg and they have approved pt from 02/14/23 to 02/14/24    I contacted pt by phone and informed her of the approval and she will contact her pharmacy for further information. I also advised her to contact our office if she has any issues.     Documents placed in yellow bag for scanning

## 2023-02-24 ENCOUNTER — HOSPITAL ENCOUNTER (OUTPATIENT)
Dept: BONE DENSITY | Facility: HOSPITAL | Age: 58
Discharge: HOME | End: 2023-02-24
Attending: FAMILY MEDICINE
Payer: COMMERCIAL

## 2023-02-24 DIAGNOSIS — M85.89 OSTEOPENIA OF MULTIPLE SITES: ICD-10-CM

## 2023-02-24 PROCEDURE — 77080 DXA BONE DENSITY AXIAL: CPT | Performed by: FAMILY MEDICINE

## 2023-02-27 ENCOUNTER — PATIENT MESSAGE (OUTPATIENT)
Dept: ENDOCRINOLOGY CLINIC | Facility: CLINIC | Age: 58
End: 2023-02-27

## 2023-02-27 PROBLEM — M81.8 OTHER OSTEOPOROSIS WITHOUT CURRENT PATHOLOGICAL FRACTURE: Status: ACTIVE | Noted: 2021-02-06

## 2023-03-01 NOTE — TELEPHONE ENCOUNTER
From: Oli Morning  To: Kaycee Kan MD  Sent: 2/27/2023 5:41 PM CST  Subject: Bone Density Test Results    Janine Kan  I had a bone density test with bad results. Can you please review my results? My PC dr told me to contact you. At this time, I do not want to add any medication to my body unless it is ABSOLUTELY NECESSARY. My goal is to increase calcium in my diet every day. With that in mind, I will not make an appt. unless you deem necessary. I will have an appt with you in the summer.     Megan Meter :)

## 2023-03-01 NOTE — TELEPHONE ENCOUNTER
Hi!  Please let patient know that she has osteoporosis. This means that she is at increased fracture risk. It is my recommendation that she takes medication to treat this. It would be great if she could see me (even if it were a video visit) so that we can discuss the risks and benefits of each of the treatments. If after discussing them she still does not want to start, then this is fine. Thank you!

## 2023-03-10 ENCOUNTER — PATIENT MESSAGE (OUTPATIENT)
Dept: OTOLARYNGOLOGY | Facility: CLINIC | Age: 58
End: 2023-03-10

## 2023-03-14 RX ORDER — OMEPRAZOLE 40 MG/1
40 CAPSULE, DELAYED RELEASE ORAL DAILY
Qty: 30 CAPSULE | Refills: 5 | Status: SHIPPED | OUTPATIENT
Start: 2023-03-14

## 2023-03-14 NOTE — TELEPHONE ENCOUNTER
From: Socorro Martinez  To: Carmela Dave MD  Sent: 3/10/2023 4:50 PM CST  Subject: Acid Reflux    My last visit with you I switched from Prevacid to Famotidine. Is there anything different I can switch to? This is not doing the best job as my throat is always in pain. You prescribed for my mom omeprazole. If that will work for me, do you mind filling the prescription?

## 2023-03-29 ENCOUNTER — OFFICE VISIT (OUTPATIENT)
Facility: CLINIC | Age: 58
End: 2023-03-29
Payer: COMMERCIAL

## 2023-03-29 VITALS
BODY MASS INDEX: 19.14 KG/M2 | HEART RATE: 83 BPM | HEIGHT: 63 IN | OXYGEN SATURATION: 98 % | DIASTOLIC BLOOD PRESSURE: 82 MMHG | WEIGHT: 108 LBS | SYSTOLIC BLOOD PRESSURE: 130 MMHG

## 2023-03-29 DIAGNOSIS — F31.9 BIPOLAR 1 DISORDER (HCC): ICD-10-CM

## 2023-03-29 DIAGNOSIS — Z12.4 PAP SMEAR FOR CERVICAL CANCER SCREENING: ICD-10-CM

## 2023-03-29 DIAGNOSIS — M81.8 OTHER OSTEOPOROSIS WITHOUT CURRENT PATHOLOGICAL FRACTURE: ICD-10-CM

## 2023-03-29 DIAGNOSIS — R63.4 WEIGHT LOSS: ICD-10-CM

## 2023-03-29 DIAGNOSIS — Z00.00 ENCOUNTER FOR ROUTINE ADULT HEALTH EXAMINATION WITHOUT ABNORMAL FINDINGS: Primary | ICD-10-CM

## 2023-03-29 PROCEDURE — 99213 OFFICE O/P EST LOW 20 MIN: CPT | Performed by: FAMILY MEDICINE

## 2023-03-29 PROCEDURE — 3008F BODY MASS INDEX DOCD: CPT | Performed by: FAMILY MEDICINE

## 2023-03-29 PROCEDURE — 87624 HPV HI-RISK TYP POOLED RSLT: CPT | Performed by: FAMILY MEDICINE

## 2023-03-29 PROCEDURE — 99396 PREV VISIT EST AGE 40-64: CPT | Performed by: FAMILY MEDICINE

## 2023-03-29 PROCEDURE — 3075F SYST BP GE 130 - 139MM HG: CPT | Performed by: FAMILY MEDICINE

## 2023-03-29 PROCEDURE — 3079F DIAST BP 80-89 MM HG: CPT | Performed by: FAMILY MEDICINE

## 2023-03-29 RX ORDER — ESTRADIOL 10 UG/1
10 INSERT VAGINAL WEEKLY
COMMUNITY
Start: 2023-03-29

## 2023-03-30 LAB — HPV I/H RISK 1 DNA SPEC QL NAA+PROBE: NEGATIVE

## 2023-04-10 ENCOUNTER — TELEMEDICINE (OUTPATIENT)
Dept: ENDOCRINOLOGY CLINIC | Facility: CLINIC | Age: 58
End: 2023-04-10

## 2023-04-10 DIAGNOSIS — E87.1 HYPONATREMIA: ICD-10-CM

## 2023-04-10 DIAGNOSIS — E22.2 SIADH (SYNDROME OF INAPPROPRIATE ADH PRODUCTION) (HCC): ICD-10-CM

## 2023-04-10 DIAGNOSIS — M81.8 OTHER OSTEOPOROSIS WITHOUT CURRENT PATHOLOGICAL FRACTURE: Primary | ICD-10-CM

## 2023-04-10 DIAGNOSIS — E55.9 VITAMIN D DEFICIENCY: ICD-10-CM

## 2023-04-10 DIAGNOSIS — E03.9 HYPOTHYROIDISM, UNSPECIFIED TYPE: ICD-10-CM

## 2023-04-10 PROCEDURE — 99214 OFFICE O/P EST MOD 30 MIN: CPT | Performed by: INTERNAL MEDICINE

## 2023-04-14 LAB
LAST PAP RESULT: NORMAL
PAP HISTORY (OTHER THAN LAST PAP): NORMAL

## 2023-04-25 ENCOUNTER — OFFICE VISIT (OUTPATIENT)
Dept: GASTROENTEROLOGY | Facility: CLINIC | Age: 58
End: 2023-04-25

## 2023-04-25 ENCOUNTER — TELEPHONE (OUTPATIENT)
Facility: CLINIC | Age: 58
End: 2023-04-25

## 2023-04-25 VITALS
HEART RATE: 87 BPM | DIASTOLIC BLOOD PRESSURE: 61 MMHG | SYSTOLIC BLOOD PRESSURE: 133 MMHG | BODY MASS INDEX: 19.14 KG/M2 | HEIGHT: 63 IN | WEIGHT: 108 LBS

## 2023-04-25 DIAGNOSIS — K21.9 GASTROESOPHAGEAL REFLUX DISEASE WITHOUT ESOPHAGITIS: Primary | ICD-10-CM

## 2023-04-25 DIAGNOSIS — K21.9 GASTROESOPHAGEAL REFLUX DISEASE, UNSPECIFIED WHETHER ESOPHAGITIS PRESENT: Primary | ICD-10-CM

## 2023-04-25 DIAGNOSIS — R13.19 ESOPHAGEAL DYSPHAGIA: ICD-10-CM

## 2023-04-25 DIAGNOSIS — R13.10 DYSPHAGIA, UNSPECIFIED TYPE: ICD-10-CM

## 2023-04-25 PROCEDURE — 3008F BODY MASS INDEX DOCD: CPT | Performed by: INTERNAL MEDICINE

## 2023-04-25 PROCEDURE — 3078F DIAST BP <80 MM HG: CPT | Performed by: INTERNAL MEDICINE

## 2023-04-25 PROCEDURE — 3075F SYST BP GE 130 - 139MM HG: CPT | Performed by: INTERNAL MEDICINE

## 2023-04-25 PROCEDURE — 99244 OFF/OP CNSLTJ NEW/EST MOD 40: CPT | Performed by: INTERNAL MEDICINE

## 2023-04-25 RX ORDER — PANTOPRAZOLE SODIUM 40 MG/1
40 TABLET, DELAYED RELEASE ORAL
Qty: 90 TABLET | Refills: 3 | Status: SHIPPED | OUTPATIENT
Start: 2023-04-25 | End: 2023-04-28

## 2023-04-25 RX ORDER — LANSOPRAZOLE 30 MG/1
30 CAPSULE, DELAYED RELEASE ORAL
COMMUNITY
End: 2023-04-25

## 2023-04-25 NOTE — PATIENT INSTRUCTIONS
PLAN  1. Schedule EGD with anesthesia [Diagnosis: esophageal dysphagia, GERD]    2. Medication adjustment: none    3. You will need to go for COVID testing 72 hours before procedure. The testing team will call you a few days before your procedure to notify you where/when you can get COVID testing. If you do not go for COVID testing, the procedure cannot be performed. 4. If you start any NEW medication after your visit today, please notify us. Certain medications will need to be held before the procedure, or the procedure cannot be performed.       5. Stop Prevacid and start Protonix (pantoprazole) 40 mg daily 30 minutes before your first meal

## 2023-04-26 NOTE — TELEPHONE ENCOUNTER
Scheduled for: EGD 93817   Provider Name: Dr Roro Rodgers   Date: Wed 7/26/2023   Location: [facility]    Sedation: MAC   Time: 7:45 am   Prep: Nothing after midnight the day before procedure and NPO 3 hrs prior procedure  Meds/Allergies Reconciled?: NKDA   Diagnosis with codes: esophageal dysphagia R13.10, gerd K21.9   Was patient informed to call insurance with codes (Y/N): Yes  Referral sent?: Yes  [address] [place] [address] or [address] [place] [address] notified?: I sent an electronic request to Endo Scheduling and received a confirmation today. Medication Orders: Pt is aware to NOT take iron pills, herbal meds and diet supplements for 7 days before exam. Also to NOT take any form of alcohol, recreational drugs and any forms of ED meds 24 hours before exam.      Misc Orders:       Further instructions given by staff:  Instructions given in office and pt verbalized understanding

## 2023-05-05 ENCOUNTER — LAB ENCOUNTER (OUTPATIENT)
Dept: LAB | Facility: REFERENCE LAB | Age: 58
End: 2023-05-05
Attending: INTERNAL MEDICINE
Payer: COMMERCIAL

## 2023-05-05 DIAGNOSIS — Z00.00 ENCOUNTER FOR ROUTINE ADULT HEALTH EXAMINATION WITHOUT ABNORMAL FINDINGS: ICD-10-CM

## 2023-05-05 DIAGNOSIS — M81.8 OTHER OSTEOPOROSIS WITHOUT CURRENT PATHOLOGICAL FRACTURE: ICD-10-CM

## 2023-05-05 DIAGNOSIS — E22.2 SIADH (SYNDROME OF INAPPROPRIATE ADH PRODUCTION) (HCC): ICD-10-CM

## 2023-05-05 DIAGNOSIS — E03.9 ACQUIRED HYPOTHYROIDISM: ICD-10-CM

## 2023-05-05 LAB
ALBUMIN SERPL-MCNC: 4.2 G/DL (ref 3.4–5)
ALBUMIN/GLOB SERPL: 1.4 {RATIO} (ref 1–2)
ALP LIVER SERPL-CCNC: 68 U/L
ALT SERPL-CCNC: 34 U/L
ANION GAP SERPL CALC-SCNC: 5 MMOL/L (ref 0–18)
AST SERPL-CCNC: 22 U/L (ref 15–37)
BASOPHILS # BLD AUTO: 0.04 X10(3) UL (ref 0–0.2)
BASOPHILS NFR BLD AUTO: 0.8 %
BILIRUB SERPL-MCNC: 0.4 MG/DL (ref 0.1–2)
BUN BLD-MCNC: 14 MG/DL (ref 7–18)
BUN/CREAT SERPL: 18.4 (ref 10–20)
CALCIUM BLD-MCNC: 9.3 MG/DL (ref 8.5–10.1)
CHLORIDE SERPL-SCNC: 101 MMOL/L (ref 98–112)
CHOLEST SERPL-MCNC: 233 MG/DL (ref ?–200)
CO2 SERPL-SCNC: 28 MMOL/L (ref 21–32)
CORTIS SERPL-MCNC: 14.8 UG/DL
CREAT BLD-MCNC: 0.76 MG/DL
DEPRECATED RDW RBC AUTO: 39.1 FL (ref 35.1–46.3)
EOSINOPHIL # BLD AUTO: 0.41 X10(3) UL (ref 0–0.7)
EOSINOPHIL NFR BLD AUTO: 8.4 %
ERYTHROCYTE [DISTWIDTH] IN BLOOD BY AUTOMATED COUNT: 12.4 % (ref 11–15)
EST. AVERAGE GLUCOSE BLD GHB EST-MCNC: 111 MG/DL (ref 68–126)
FASTING PATIENT LIPID ANSWER: YES
FASTING STATUS PATIENT QL REPORTED: YES
GFR SERPLBLD BASED ON 1.73 SQ M-ARVRAT: 92 ML/MIN/1.73M2 (ref 60–?)
GLOBULIN PLAS-MCNC: 3 G/DL (ref 2.8–4.4)
GLUCOSE BLD-MCNC: 108 MG/DL (ref 70–99)
HBA1C MFR BLD: 5.5 % (ref ?–5.7)
HCT VFR BLD AUTO: 41.5 %
HDLC SERPL-MCNC: 120 MG/DL (ref 40–59)
HGB BLD-MCNC: 14.2 G/DL
IMM GRANULOCYTES # BLD AUTO: 0.01 X10(3) UL (ref 0–1)
IMM GRANULOCYTES NFR BLD: 0.2 %
LDLC SERPL CALC-MCNC: 105 MG/DL (ref ?–100)
LYMPHOCYTES # BLD AUTO: 1.53 X10(3) UL (ref 1–4)
LYMPHOCYTES NFR BLD AUTO: 31.3 %
MCH RBC QN AUTO: 29.5 PG (ref 26–34)
MCHC RBC AUTO-ENTMCNC: 34.2 G/DL (ref 31–37)
MCV RBC AUTO: 86.1 FL
MONOCYTES # BLD AUTO: 0.51 X10(3) UL (ref 0.1–1)
MONOCYTES NFR BLD AUTO: 10.4 %
NEUTROPHILS # BLD AUTO: 2.39 X10 (3) UL (ref 1.5–7.7)
NEUTROPHILS # BLD AUTO: 2.39 X10(3) UL (ref 1.5–7.7)
NEUTROPHILS NFR BLD AUTO: 48.9 %
NONHDLC SERPL-MCNC: 113 MG/DL (ref ?–130)
OSMOLALITY SERPL CALC.SUM OF ELEC: 279 MOSM/KG (ref 275–295)
PHOSPHATE SERPL-MCNC: 3.4 MG/DL (ref 2.5–4.9)
PLATELET # BLD AUTO: 204 10(3)UL (ref 150–450)
POTASSIUM SERPL-SCNC: 4 MMOL/L (ref 3.5–5.1)
PROT SERPL-MCNC: 7.2 G/DL (ref 6.4–8.2)
PTH-INTACT SERPL-MCNC: 27.7 PG/ML (ref 18.5–88)
RBC # BLD AUTO: 4.82 X10(6)UL
SODIUM SERPL-SCNC: 134 MMOL/L (ref 136–145)
T4 FREE SERPL-MCNC: 1 NG/DL (ref 0.8–1.7)
TRIGL SERPL-MCNC: 49 MG/DL (ref 30–149)
TSI SER-ACNC: 0.94 MIU/ML (ref 0.36–3.74)
VIT D+METAB SERPL-MCNC: 38.1 NG/ML (ref 30–100)
VLDLC SERPL CALC-MCNC: 8 MG/DL (ref 0–30)
WBC # BLD AUTO: 4.9 X10(3) UL (ref 4–11)

## 2023-05-05 PROCEDURE — 83036 HEMOGLOBIN GLYCOSYLATED A1C: CPT | Performed by: FAMILY MEDICINE

## 2023-05-05 PROCEDURE — 80061 LIPID PANEL: CPT | Performed by: FAMILY MEDICINE

## 2023-05-05 PROCEDURE — 80053 COMPREHEN METABOLIC PANEL: CPT | Performed by: FAMILY MEDICINE

## 2023-05-05 PROCEDURE — 85025 COMPLETE CBC W/AUTO DIFF WBC: CPT | Performed by: FAMILY MEDICINE

## 2023-05-06 LAB — ACTH: 26.2 PG/ML

## 2023-05-07 ENCOUNTER — PATIENT MESSAGE (OUTPATIENT)
Dept: ENDOCRINOLOGY CLINIC | Facility: CLINIC | Age: 58
End: 2023-05-07

## 2023-05-07 DIAGNOSIS — E03.9 HYPOTHYROIDISM, UNSPECIFIED TYPE: Primary | ICD-10-CM

## 2023-05-07 DIAGNOSIS — L65.9 HAIR LOSS: ICD-10-CM

## 2023-05-09 LAB
TTG IGA SER-ACNC: 0.5 U/ML (ref ?–7)
TTG IGG SER-ACNC: <0.6 U/ML (ref ?–7)

## 2023-05-10 LAB — ALKALINE PHOSPHATASE BONE SPECIFIC: 17.6 UG/L

## 2023-05-11 ENCOUNTER — OFFICE VISIT (OUTPATIENT)
Dept: NEUROLOGY | Facility: CLINIC | Age: 58
End: 2023-05-11
Payer: COMMERCIAL

## 2023-05-11 VITALS — BODY MASS INDEX: 19.14 KG/M2 | WEIGHT: 108 LBS | HEIGHT: 63 IN

## 2023-05-11 DIAGNOSIS — R90.89 ABNORMAL FINDING ON MRI OF BRAIN: Primary | ICD-10-CM

## 2023-05-11 PROCEDURE — 99205 OFFICE O/P NEW HI 60 MIN: CPT | Performed by: OTHER

## 2023-05-11 PROCEDURE — 3008F BODY MASS INDEX DOCD: CPT | Performed by: OTHER

## 2023-05-11 NOTE — PATIENT INSTRUCTIONS
Get the mri from [address] [place], [address]    Phone: [phone]  Fax: [phone]    2. Get a [facility]ri of your neck from [facility] [place]  [facility]: [facility]  [facility] your brands  [address] [facility], [address] [place] [address] [address]kw[address] ~18.7 [address]  [phone]

## 2023-05-13 NOTE — TELEPHONE ENCOUNTER
Dr. Miguel De Paz    Please see message. Patient also send another message in addition:    In addition, I have had to go up in my bipolar meds and I am still feeling aggressive in nature. I think something is going on with me and I pray it's thyroid related. I am yelling at my kids and don't want to be around people as much. Part of it is my hair. It is very depressing. Especially as I get older, it's the only thing I have that helped. I am not in a good place. I have regular appts. with my psychiatrist.    Two labs are still processing.
From: Glorietta Lennox  To: Kaycee De Paz MD  Sent: 5/7/2023 6:50 PM CDT  Subject: Test Results    Dr Miguel De Paz,  I am STILL losing hair! I am losing weight and have no appetite. I just had a physical with Berdine Phoenix (if you can access her test results?), and she said everything was good on her end. Can this be because of my thyroid? I am starting to really worry. I feel okay but I have to buy a new wardrobe and I am never hungry and not much fun anymore. I am feeling good, but I do have moments of being hyper just occasionally and that could be because of my bipolar too. I hope you can help me, again.  :)
Hi!  I can add the test. She should have her blood tests completed in the morning before 9 AM. Thank you!
Hi!  Please let patient know that this has nothing to do with her thyroid. If anything, we would actually decrease her thyroid medication if we were worried about aggression being caused by the thyroid. Again, if she is concerned about hair loss, she has to see a dermatologist. They are the experts in this field. The most that I can do, is to check for androgen levels and see if they are elevated. Tasandip you!
Replied to patient's [email or web address] message with response from Dr. Lesly Akers. Waiting for patient reply.
Responded to patient with below message as outlined.
90

## 2023-06-22 ENCOUNTER — PATIENT MESSAGE (OUTPATIENT)
Dept: ENDOCRINOLOGY CLINIC | Facility: CLINIC | Age: 58
End: 2023-06-22

## 2023-06-22 NOTE — TELEPHONE ENCOUNTER
From: Terrence Zuluaga  To: Kaycee Guardado MD  Sent: 6/22/2023 12:02 AM CDT  Subject: Question regarding Lola Cash, this test result looks not in the range. Is this concerning?

## 2023-06-22 NOTE — TELEPHONE ENCOUNTER
Component      Latest Ref Rng 5/5/2023   Tissue Transglutaminase IgG Ab      <7.0 U/mL <0.6        Dr Ellis Ar lab note: Normal result. Hi Ashley! I have reviewed your results and they are within normal limits. Please feel free to contact me with any questions you may have. Have a great day!     Responded to pt via [employer]

## 2023-07-12 ENCOUNTER — TELEMEDICINE (OUTPATIENT)
Dept: NEUROLOGY | Facility: CLINIC | Age: 58
End: 2023-07-12
Payer: COMMERCIAL

## 2023-07-12 ENCOUNTER — TELEPHONE (OUTPATIENT)
Facility: CLINIC | Age: 58
End: 2023-07-12

## 2023-07-12 DIAGNOSIS — R93.89 ABNORMAL MRI: ICD-10-CM

## 2023-07-12 DIAGNOSIS — R41.3 MEMORY IMPAIRMENT: ICD-10-CM

## 2023-07-12 DIAGNOSIS — R41.3 SHORT-TERM MEMORY LOSS: Primary | ICD-10-CM

## 2023-07-12 DIAGNOSIS — H91.8X2 OTHER SPECIFIED HEARING LOSS OF LEFT EAR, UNSPECIFIED HEARING STATUS ON CONTRALATERAL SIDE: ICD-10-CM

## 2023-07-12 DIAGNOSIS — R06.83 SNORING: ICD-10-CM

## 2023-07-12 PROCEDURE — 99212 OFFICE O/P EST SF 10 MIN: CPT | Performed by: OTHER

## 2023-07-12 NOTE — PROGRESS NOTES
I conducted a telehealth visit with Aram Weinberg today, 07/12/23, which was completed using two-way, real-time interactive audio and video communication. This has been done in good keyshawn to provide continuity of care in the best interest of the provider-patient relationship, due to the COVID -19 public health crisis/national emergency where restrictions of face-to-face office visits are ongoing. Every conscious effort was taken to allow for sufficient and adequate time to complete the visit. The patient was made aware of the limitations of the telehealth visit, including treatment limitations as no physical exam could be performed. The patient was advised to call 911 or to go to the ER in case there was an emergency. The patient was also advised of the potential privacy & security concerns related to the telehealth platform. The patient was made aware of where to find [facility] notice of privacy practices, telehealth consent form and other related consent forms and documents. which are located on the [facility] website. The patient verbally agreed to telehealth consent form, related consents and the risks discussed. Lastly, the patient confirmed that they were in [place]. Included in this visit, time may have been spent reviewing labs, medications, radiology tests and decision making. Appropriate medical decision-making and tests are ordered as detailed in the plan of care above. Coding/billing information is submitted for this visit based on complexity of care and/or time spent for the visit.         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 .     She was placed on oxcarbazepine for anxiety and depression. 07/12/23 Interval History/Subjective :     She did not get the repeat mri done. Did not get the prior MRI uploaded. She said she did not know why the repeat  scan was ordered. Explained it was done to see if there was an interval change from the prior scan b/c the purpose of her visit was to see if her white matter changes were d/t multiple sclerosis. States she did not have her issues addressed; explaine \"one issue, one visit\" and that I was happy to address her memory issues now. She reports  deficits in her long term memory \"her whole adult life. \" Reports when other ppl tell stories she can't tell stories about routine events/life when her kids were young or when she was young. Reports \"it is scary. \"    This has been going for 30 yrs. Looking back at old pictures may jog her memory but Miguel Martin does not remember the situation. Its not like others. I am not a human being. \"  Not getting worse. Now that she sis aging she reports short term memory loss. Also reports trepeditation/fear/anxiety about aging and death. No issues remembering her address or specific autobiographical facts. No family hx of dementia.  +snores. Does not wake up with headaches  She wakes up \"okay. Not obviously tired or obviously well rested. \"            ROS: Pertinent positive and negatives per HPI. All others were reviewed and negative.          Medications:  Current Outpatient Medications   Medication Instructions    ALPRAZolam (XANAX) 0.5 mg, Oral, Nightly PRN    amphetamine-dextroamphetamine 10 MG Oral Tab 10 mg, Oral, Daily    Calcium Carbonate-Vitamin D (CALCIUM 600+D HIGH POTENCY OR) 2 TABLETS DAILY    cetirizine 10 MG Oral Tab 1 TABLET DAILY    doxepin (SINEQUAN) 30 mg, Oral, Nightly    DULoxetine 30 MG Oral Cap DR Particles No dose, route, or frequency recorded. Estradiol (VAGIFEM) 10 mcg, Vaginal, Weekly    fluticasone propionate 50 MCG/ACT Nasal Suspension 2 sprays, Nasal, Daily    ketoconazole 2 % External Shampoo As needed    OXcarbazepine (TRILEPTAL) 300 mg, Oral, 3 times daily    pantoprazole (PROTONIX) 40 mg, Oral, Every morning before breakfast    Synthroid 25 mcg, Oral, Before breakfast    traZODone (DESYREL) 100 mg, Oral, Nightly    triamcinolone 0.1 % External Lotion As needed        Reviewed and assessed      Objective:    Last vitals and weight :  There is no height or weight on file to calculate BMI. There were no vitals filed for this visit. Exam:  - General: appears stated age and no distress  - Pulmonary: Normal excursion of the chest.  No signs of respiratory distress. Neurologic Exam     - Mental Status: Alert and attentive. Speech is spontaneous, fluent, and prosodic. Comprehension intact. Phrase length and rate are normal.  Fund of knowledge are good. Able to provide history. .  No paraphasic errors, anomia, acalculia, neglect, or R/L confusion.   - Cranial Nerves: No gaze preference. Visual fields: Full. Gaze is conjugate. EOMI. No nystagmus. No ptosis. No bitemporal atrophy. No masseter atrophy. No jaw deviation. Patient reports sensation in all 3 branches of the trigeminal is intact and symmetric. No pathological facial asymmetry. No flattening of the nasolabial fold. Odella Bowen Hearing grossly intact. Tongue midline. No atrophy or fasiculations of the tongue noted. Palate and uvula elevate symmetrically. Shoulder shrug symmetric. Able to turn her head left and right. - Motor:  normal tone/bulk. Motor Strength: moving all extremities symmetrically and equally. Tremor/Abnormal movements: No tremor/abnormal movements appreciated. - Sensory:   Light touch: Denies any deficits. - Cerebellum: No truncal ataxia. No titubations. No dysmetria, no dysdiadochokinesis. No overshoot.                Most recent lab results:   Reviewed and assessed  No results found for this or any previous visit (from the past 36 hour(s)). Diagnostic studies:       Assessment     Her long term memory loss/poor memory formation may be due to her anxiety and adhd. If the patient was not paying attention or preoccupied when the events were occurring then she will have poor recollection of them later. Mood disorders can also affect people memory. Her short term memory loss may also be d/t her mood disorder, possible untreated JULIETH, and hearing loss. May also be due to neurological disease. This is why she needs a repeat MRI. I stressed this to her. Plan   1. Short-term memory loss  - OP REFERRAL TO AUDIOLOGY  - OP EM ALT REFERRAL HOME SLEEP APNEA TEST  - GENERAL SLEEP STUDY TRANSCRIPTION; Future    2. Memory impairment  - OP EM ALT REFERRAL HOME SLEEP APNEA TEST  - GENERAL SLEEP STUDY TRANSCRIPTION; Future    3. Abnormal MRI  - OP [facility] ALT REFERRAL HOME SLEEP APNEA TEST  - GENERAL SLEEP STUDY TRANSCRIPTION; Future    4. Snoring  - OP [facility] ALT REFERRAL HOME SLEEP APNEA TEST  - GENERAL SLEEP STUDY TRANSCRIPTION; Future    5. Other specified hearing loss of left ear, unspecified hearing status on contralateral side  - OP EM ALT REFERRAL HOME SLEEP APNEA TEST  - GENERAL SLEEP STUDY TRANSCRIPTION; Future                  Education Provided  Education/Instructions given to: patient   Barriers to Learning:  possibly her memory  Content: Refer to note above   Evaluation/Outcome: Verbalized understanding    This document is not intended to support charting by exception. Sections left blank in a completed note should be presumed not to have been done. Disclaimer: This record was dictated using  [address]. There may be errors due to voice recognition problems that were not realized and corrected during the completion of the note.       I spent 10 minutes counseling the patient regarding the findings of investigations, treatment options, risks and benefits of treatment, and management of care regarding the diagnosis above. Thank you for allowing me to participate in the care of your patient.     Junius Gosselin, DO  7/12/2023  11:46 AM

## 2023-07-12 NOTE — TELEPHONE ENCOUNTER
Pt calling for prep instructions for 7/26 EGD. She is also questioning having someone drive and wait for her to complete procedure. She states she may need to hire a transport service to take her home.   Please call

## 2023-07-13 NOTE — TELEPHONE ENCOUNTER
Follow up call made, pt's name/ verified. Scheduled EGd on 23. Instructed pt to be NPO pmn. Discussed need to have someone to be with her post procedure who will also drive her home. Patient verbalized understanding, no further concerns, issues at this time. TE closed.

## 2023-07-26 ENCOUNTER — ANESTHESIA (OUTPATIENT)
Dept: ENDOSCOPY | Facility: HOSPITAL | Age: 58
End: 2023-07-26
Payer: COMMERCIAL

## 2023-07-26 ENCOUNTER — HOSPITAL ENCOUNTER (OUTPATIENT)
Facility: HOSPITAL | Age: 58
Setting detail: HOSPITAL OUTPATIENT SURGERY
Discharge: HOME | End: 2023-07-26
Attending: INTERNAL MEDICINE | Admitting: INTERNAL MEDICINE
Payer: COMMERCIAL

## 2023-07-26 ENCOUNTER — ANESTHESIA EVENT (OUTPATIENT)
Dept: ENDOSCOPY | Facility: HOSPITAL | Age: 58
End: 2023-07-26
Payer: COMMERCIAL

## 2023-07-26 DIAGNOSIS — K21.9 GASTROESOPHAGEAL REFLUX DISEASE WITHOUT ESOPHAGITIS: ICD-10-CM

## 2023-07-26 DIAGNOSIS — R13.10 DYSPHAGIA, UNSPECIFIED TYPE: ICD-10-CM

## 2023-07-26 PROCEDURE — 43239 EGD BIOPSY SINGLE/MULTIPLE: CPT | Performed by: INTERNAL MEDICINE

## 2023-07-26 PROCEDURE — 0DB68ZX EXCISION OF STOMACH, VIA NATURAL OR ARTIFICIAL OPENING ENDOSCOPIC, DIAGNOSTIC: ICD-10-PCS | Performed by: INTERNAL MEDICINE

## 2023-07-26 PROCEDURE — 0DB98ZX EXCISION OF DUODENUM, VIA NATURAL OR ARTIFICIAL OPENING ENDOSCOPIC, DIAGNOSTIC: ICD-10-PCS | Performed by: INTERNAL MEDICINE

## 2023-07-26 PROCEDURE — 0DB58ZX EXCISION OF ESOPHAGUS, VIA NATURAL OR ARTIFICIAL OPENING ENDOSCOPIC, DIAGNOSTIC: ICD-10-PCS | Performed by: INTERNAL MEDICINE

## 2023-07-26 RX ORDER — LIDOCAINE HYDROCHLORIDE 10 MG/ML
INJECTION, SOLUTION EPIDURAL; INFILTRATION; INTRACAUDAL; PERINEURAL AS NEEDED
Status: DISCONTINUED | OUTPATIENT
Start: 2023-07-26 | End: 2023-07-26 | Stop reason: SURG

## 2023-07-26 RX ORDER — NALOXONE HYDROCHLORIDE 0.4 MG/ML
80 INJECTION, SOLUTION INTRAMUSCULAR; INTRAVENOUS; SUBCUTANEOUS AS NEEDED
Status: DISCONTINUED | OUTPATIENT
Start: 2023-07-26 | End: 2023-07-26

## 2023-07-26 RX ORDER — GLYCOPYRROLATE 0.2 MG/ML
INJECTION, SOLUTION INTRAMUSCULAR; INTRAVENOUS AS NEEDED
Status: DISCONTINUED | OUTPATIENT
Start: 2023-07-26 | End: 2023-07-26 | Stop reason: SURG

## 2023-07-26 RX ORDER — SODIUM CHLORIDE, SODIUM LACTATE, POTASSIUM CHLORIDE, CALCIUM CHLORIDE 600; 310; 30; 20 MG/100ML; MG/100ML; MG/100ML; MG/100ML
INJECTION, SOLUTION INTRAVENOUS CONTINUOUS
Status: DISCONTINUED | OUTPATIENT
Start: 2023-07-26 | End: 2023-07-26

## 2023-07-26 RX ADMIN — GLYCOPYRROLATE 0.2 MG: 0.2 INJECTION, SOLUTION INTRAMUSCULAR; INTRAVENOUS at 07:52:00

## 2023-07-26 RX ADMIN — SODIUM CHLORIDE, SODIUM LACTATE, POTASSIUM CHLORIDE, CALCIUM CHLORIDE: 600; 310; 30; 20 INJECTION, SOLUTION INTRAVENOUS at 08:10:00

## 2023-07-26 RX ADMIN — LIDOCAINE HYDROCHLORIDE 50 MG: 10 INJECTION, SOLUTION EPIDURAL; INFILTRATION; INTRACAUDAL; PERINEURAL at 07:54:00

## 2023-07-26 RX ADMIN — SODIUM CHLORIDE, SODIUM LACTATE, POTASSIUM CHLORIDE, CALCIUM CHLORIDE: 600; 310; 30; 20 INJECTION, SOLUTION INTRAVENOUS at 07:50:00

## 2023-07-26 NOTE — ANESTHESIA POSTPROCEDURE EVALUATION
Patient: Ulysses Shape    Procedure Summary       Date: 23 Room / Location: [address] [place] [address] ENDOSCOPY  [address] [place] [address] ENDOSCOPY    Anesthesia Start: 352 Anesthesia Stop:     Procedure: ESOPHAGOGASTRODUODENOSCOPY (EGD) Diagnosis:       Gastroesophageal reflux disease without esophagitis      Dysphagia, unspecified type      (gastritis,hiatial hernia, polyps,)    Surgeons: Salvatore Apgar, MD Anesthesiologist: Efrain Armenta CRNA    Anesthesia Type: general ASA Status: 2            Anesthesia Type: general    Vitals Value Taken Time   BP 81/41 23 0810   Temp N/a 23 0811   Pulse 53 23 0810   Resp 16 23 0810   SpO2 99 % 23 0810   Vitals shown include unvalidated device data.     [address] [place] [address] AN Post Evaluation:   Patient Evaluated in PACU  Patient Participation: complete - patient participated  Level of Consciousness: sleepy but conscious  Pain Management: adequate  Airway Patency:patent  Yes    Cardiovascular Status: acceptable  Respiratory Status: acceptable and room air  Postoperative Hydration acceptable      Afshan Johnston CRNA  2023 8:11 AM

## 2023-07-26 NOTE — OPERATIVE REPORT
ESOPHAGOGASTRODUODENOSCOPY (EGD) REPORT    Dickson     1965 Age 63year old   PCP Jena Gray DO Endoscopist Lj Malcolm MD     Date of procedure: 23    Procedure: EGD w/ cold biopsy    Pre-operative diagnosis: esophageal dysphagia, GERD, weight loss    Post-operative diagnosis: gastritis, gastric polyps, hiatal hernia    Medications: MAC    Complications: none    Procedure:  Informed consent was obtained from the patient after the risks of the procedure were discussed, including but not limited to bleeding, perforation, aspiration, infection, or possibility of a missed lesion. After discussions of the risks/benefits and alternatives to this procedure, as well as the planned sedation, the patient was placed in the left lateral decubitus position and begun on continuous blood pressure pulse oximetry and EKG monitoring and this was maintained throughout the procedure. Once an adequate level of sedation was obtained a bite block was placed. Then the lubricated tip of the [id number] diagnostic video upper endoscope was inserted and advanced using direct visualization into the posterior pharynx and ultimately into the esophagus. Complications: None    Findings:      1. Esophagus: The squamocolumnar junction was noted at 36 cm and appeared normal. The diaphragmatic pinch was noted noted at 40 cm from the incisors. There was a 4 cm hiatal hernia. The esophageal mucosa appeared unremarkable. There was no endoscopic findings of esophagitis, stricture or Benjamin's esophagus. Cold forceps biopsies were taken of the proximal and distal esophagus. 2. Stomach: The stomach distended normally. Normal rugal folds were seen. The pylorus was patent. There were several gastric polyps in the fundus and body measuring less than 5 mm that were sampled with cold forceps biopsies.  The gastric mucosa appeared mildly erythematous in the antrum and cold forceps biopsies were taken of the antrum, incisura, and body. Retroflexion revealed a normal fundus and a patulous cardia. 3. Duodenum: The duodenal mucosa appeared normal in the 1st and 2nd portion of the duodenum. Cold forceps biopsies were taken of the bulb and descending duodenum. Impression:  1. Mild gastritis. 2. Gastric polyps. Likely fundic gland polyps. Biopsied. 3. 4 cm hiatal hernia. 4. Otherwise unremarkable EGD s/p biopsies of the esophagus, stomach, and duodenum. Recommend:  Await pathology. Take protonix 40 mg daily - please take it every day. Follow up with ENT. Follow up with Dr. Remy Graham in ~2 months.     >>>If tissue was sampled/removed and you have not received your pathology results either by phone or letter within 2 weeks, please call our office at [phone].     Specimens: esophagus, stomach, stomach polyps, duodenum    Blood loss: <1 ml

## 2023-07-26 NOTE — H&P
History & Physical Examination    Patient Name: Hanna Rubio  MRN: [id number]  CSN: [id number]  YOB: 1965    Diagnosis: esophageal dysphagia, GERD, weight loss    pantoprazole 40 MG Oral Tab EC, Take 1 tablet (40 mg total) by mouth every morning before breakfast., Disp: 90 tablet, Rfl: 3, 7/24/2023  Estradiol (VAGIFEM) 10 MCG Vaginal Tab, Place 10 mcg vaginally once a week., Disp: , Rfl: ,  at prn  SYNTHROID 25 MCG Oral Tab, Take 1 tablet (25 mcg total) by mouth before breakfast., Disp: 90 tablet, Rfl: 1, 7/25/2023  DULoxetine 30 MG Oral Cap DR Particles, , Disp: , Rfl: , 7/25/2023  traZODone 100 MG Oral Tab, Take 1 tablet (100 mg total) by mouth nightly., Disp: , Rfl: , 7/25/2023  cetirizine 10 MG Oral Tab, 1 TABLET DAILY, Disp: , Rfl: ,  at prn  Doxepin HCl 10 MG Oral Cap, Take 3 capsules (30 mg total) by mouth nightly., Disp: , Rfl: 0, 7/25/2023  OXcarbazepine 300 MG Oral Tab, Take 1 tablet (300 mg total) by mouth 3 (three) times daily. , Disp: , Rfl: , 7/25/2023  ALPRAZolam 0.5 MG Oral Tab, Take 1 tablet (0.5 mg total) by mouth nightly as needed for Sleep., Disp: , Rfl: , 7/25/2023  amphetamine-dextroamphetamine 10 MG Oral Tab, Take 1 tablet (10 mg total) by mouth daily. , Disp: , Rfl: , 7/24/2023  fluticasone propionate 50 MCG/ACT Nasal Suspension, 2 sprays by Nasal route daily. , Disp: 3 each, Rfl: 1,  at prn  ketoconazole 2 % External Shampoo, as needed. , Disp: , Rfl:   triamcinolone 0.1 % External Lotion, as needed. , Disp: , Rfl:   Calcium Carbonate-Vitamin D (CALCIUM 600+D HIGH POTENCY OR), 2 TABLETS DAILY, Disp: , Rfl: , 7/25/2023      lactated ringers infusion, , Intravenous, Continuous        Allergies:   Seasonal                OTHER (SEE COMMENTS)    Comment:Sneezing, watery eyes.     Past Medical History:   Diagnosis Date    Allergic rhinitis 30 years ago    Anemia     Anxiety my entire life    Asthma for a few years 12 years ago    Bipolar depression ([address]     Bulimia     Depression most of my life    Disorder of thyroid     hypothyroidism    Esophageal reflux     Hyperthyroidism I have some thyroid issue     Past Surgical History:   Procedure Laterality Date          COLONOSCOPY N/A 08/10/2021    Procedure: COLONOSCOPY;  Surgeon: Erick Teixeria MD;  Location: [facility] ENDOSCOPY    COLONOSCOPY      UPPER GI ENDOSCOPY,EXAM       Family History   Problem Relation Age of Onset    Hypertension Father     Colon Cancer Mother     Cancer Mother 61        colon cancer    Stroke Mother     Cancer Paternal Grandmother     Breast Cancer Paternal Grandmother         42-54's     Social History    Tobacco Use      Smoking status: Former      Smokeless tobacco: Never      Tobacco comments: smoker for 10 years I quit 15 years ago    Alcohol use: Not Currently        SYSTEM Check if Review is Normal Check if Physical Exam is Normal If not normal, please explain:   HEENT Pelagia.Ebbing ] [ Ilean Child  [email or web address].Ebbing ] [ Delmon Sauger Pelagia.Ebbing ] [ True Lesser Pelagia.Ebbing ] [ Bradley [place] Pelagia.Ebbing ] [ Marzella Dove Pelagia.Ebbing ] [ David So        I have discussed the risks and benefits and alternatives of the procedure with the patient/family. They understand and agree to proceed with plan of care. I have reviewed the History and Physical done within the last 30 days. Any changes noted above.     Dariela Malik MD  [facility], [facility] - Gastroenterology  2023

## 2023-07-26 NOTE — DISCHARGE INSTRUCTIONS
Home Care Instructions for Gastroscopy with Sedation    Diet:  - Resume your regular diet as tolerated unless otherwise instructed. - Start with light meals to minimize bloating.  - Do not drink alcohol today. Medication:  - If you have questions about resuming your normal medications, please contact your Primary Care Physician. Activities:  - Take it easy today. Do not return to work today. - Do not drive today. - Do not operate any machinery today (including kitchen equipment). Gastroscopy:  - You may have a sore throat for 2-3 days following the exam. This is normal. Gargling with warm salt water (1/2 tsp salt to 1 glass warm water) or using throat lozenges will help. - If you experience any sharp pain in your neck, abdomen or chest, vomiting of blood, oral temperature over 100 degrees Fahrenheit, light-headedness or dizziness, or any other problems, contact your doctor. **If unable to reach your doctor, please go to the [facility] Emergency Room**    - Your referring physician will receive a full report of your examination.  - If you do not hear from your doctor's office within two weeks of your biopsy, please call them for your results. You may be able to see your laboratory results in [facility] between 4 and 7 business days. In some cases, your physician may not have viewed the results before they are released to [address] If you have questions regarding your results contact the physician who ordered the test/exam by phone or via [address] by choosing \"Ask a Medical Question. \"

## 2023-07-27 VITALS
TEMPERATURE: 97 F | HEIGHT: 62 IN | BODY MASS INDEX: 19.32 KG/M2 | RESPIRATION RATE: 14 BRPM | WEIGHT: 105 LBS | OXYGEN SATURATION: 99 % | SYSTOLIC BLOOD PRESSURE: 95 MMHG | DIASTOLIC BLOOD PRESSURE: 58 MMHG | HEART RATE: 64 BPM

## 2023-07-28 ENCOUNTER — TELEPHONE (OUTPATIENT)
Dept: GASTROENTEROLOGY | Facility: CLINIC | Age: 58
End: 2023-07-28

## 2023-07-28 DIAGNOSIS — K20.0 EOSINOPHILIC ESOPHAGITIS: Primary | ICD-10-CM

## 2023-07-28 RX ORDER — PANTOPRAZOLE SODIUM 40 MG/1
40 TABLET, DELAYED RELEASE ORAL EVERY 12 HOURS
Qty: 180 TABLET | Refills: 3 | Status: SHIPPED | OUTPATIENT
Start: 2023-07-28 | End: 2023-07-31

## 2023-07-28 NOTE — TELEPHONE ENCOUNTER
Patient contacted, name/ verified. Relayed message below from Dr. Irving Malik regarding EoE per EGD, protonix, follow up appt-set below. Patient verbalized understanding. All questions answered. Your Appointments      2023 11:30 AM  Follow Up Visit with MD Faby Gil, [facility] ([address]) [facility] [address]  [phone]     No further action required, TE closed.

## 2023-07-31 ENCOUNTER — PATIENT MESSAGE (OUTPATIENT)
Dept: NEUROLOGY | Facility: CLINIC | Age: 58
End: 2023-07-31

## 2023-07-31 ENCOUNTER — PATIENT MESSAGE (OUTPATIENT)
Dept: GASTROENTEROLOGY | Facility: CLINIC | Age: 58
End: 2023-07-31

## 2023-07-31 ENCOUNTER — TELEPHONE (OUTPATIENT)
Dept: NEUROLOGY | Facility: CLINIC | Age: 58
End: 2023-07-31

## 2023-07-31 RX ORDER — PANTOPRAZOLE SODIUM 40 MG/1
40 TABLET, DELAYED RELEASE ORAL EVERY 12 HOURS
Qty: 180 TABLET | Refills: 3 | Status: SHIPPED | OUTPATIENT
Start: 2023-07-31

## 2023-07-31 NOTE — TELEPHONE ENCOUNTER
Called patient, name/ verified. Pt was quite upset. I obtained and clarified pt's preferred pharmacy ([facility] mail order) and sent her pantoprazole there. Patient verbalized understanding, no further concerns, issues at this time.

## 2023-07-31 NOTE — TELEPHONE ENCOUNTER
From: Richi Ross  To: Juan Ulrich DO  Sent: 7/31/2023 9:28 AM CDT  Subject: NEW MRI    I need an order just for the NEW MRI. There is nothing for me to take to another location to have this done. I cannot have done at the hospital. please send alone by itself.

## 2023-07-31 NOTE — TELEPHONE ENCOUNTER
From: Betty Nissen  To: Sophia Naranjo MD  Sent: 7/31/2023 9:27 AM CDT  Subject: New Prescription for Pantoprazole    My new prescription was NOT SENT to the proper place even though I mentioned in my visit to the [facility] to send to my MAIL ORDER [facility] NOT [facility]. Please correct this. Annoying that nobody listens to me, just like at home. Mom.

## 2023-09-01 DIAGNOSIS — R90.89 ABNORMAL FINDING ON MRI OF BRAIN: Primary | ICD-10-CM

## 2023-09-01 DIAGNOSIS — R90.82 WHITE MATTER ABNORMALITY ON MRI OF BRAIN: ICD-10-CM

## 2023-09-12 ENCOUNTER — LAB ENCOUNTER (OUTPATIENT)
Dept: LAB | Facility: REFERENCE LAB | Age: 58
End: 2023-09-12
Attending: INTERNAL MEDICINE
Payer: COMMERCIAL

## 2023-09-12 ENCOUNTER — TELEPHONE (OUTPATIENT)
Dept: NEUROLOGY | Facility: CLINIC | Age: 58
End: 2023-09-12

## 2023-09-12 ENCOUNTER — TELEPHONE (OUTPATIENT)
Dept: ENDOCRINOLOGY CLINIC | Facility: CLINIC | Age: 58
End: 2023-09-12

## 2023-09-12 DIAGNOSIS — E03.9 HYPOTHYROIDISM, UNSPECIFIED TYPE: Primary | ICD-10-CM

## 2023-09-12 DIAGNOSIS — E03.9 HYPOTHYROIDISM, UNSPECIFIED TYPE: ICD-10-CM

## 2023-09-12 DIAGNOSIS — L65.9 HAIR LOSS: ICD-10-CM

## 2023-09-12 LAB
DHEA-S SERPL-MCNC: 169 UG/DL
T4 FREE SERPL-MCNC: 1.1 NG/DL (ref 0.8–1.7)
TSI SER-ACNC: 0.35 MIU/ML (ref 0.36–3.74)

## 2023-09-12 PROCEDURE — 82627 DEHYDROEPIANDROSTERONE: CPT

## 2023-09-12 PROCEDURE — 84439 ASSAY OF FREE THYROXINE: CPT

## 2023-09-12 PROCEDURE — 36415 COLL VENOUS BLD VENIPUNCTURE: CPT

## 2023-09-12 PROCEDURE — 84443 ASSAY THYROID STIM HORMONE: CPT

## 2023-09-12 PROCEDURE — 84410 TESTOSTERONE BIOAVAILABLE: CPT

## 2023-09-16 LAB
SEX HORM BIND GLOB: 131 NMOL/L
TESTOST % FREE+WEAK BND: 7.4 %
TESTOST FREE+WEAK BND: 1.2 NG/DL
TESTOSTERONE TOT /MS: 15.9 NG/DL

## 2023-09-25 ENCOUNTER — LAB ENCOUNTER (OUTPATIENT)
Dept: LAB | Facility: HOSPITAL | Age: 58
End: 2023-09-25
Attending: INTERNAL MEDICINE
Payer: COMMERCIAL

## 2023-09-25 DIAGNOSIS — E03.9 HYPOTHYROIDISM, UNSPECIFIED TYPE: ICD-10-CM

## 2023-09-25 LAB
T4 FREE SERPL-MCNC: 1 NG/DL (ref 0.8–1.7)
TSI SER-ACNC: 0.16 MIU/ML (ref 0.36–3.74)

## 2023-09-25 PROCEDURE — 36415 COLL VENOUS BLD VENIPUNCTURE: CPT

## 2023-09-25 PROCEDURE — 84443 ASSAY THYROID STIM HORMONE: CPT

## 2023-09-25 PROCEDURE — 84439 ASSAY OF FREE THYROXINE: CPT

## 2023-10-02 ENCOUNTER — TELEPHONE (OUTPATIENT)
Dept: ENDOCRINOLOGY CLINIC | Facility: CLINIC | Age: 58
End: 2023-10-02

## 2023-10-02 NOTE — TELEPHONE ENCOUNTER
Patient is calling states that she is having issues with her phone and signing in for appointment

## 2023-10-02 NOTE — TELEPHONE ENCOUNTER
Spoke to patient  - she was very upset - stated she could do e-check in and then could not get to waiting room: \"something wrong with your sina\"; \"you need to hire more people to answer the phone\"  Patient given # to  help desk to check if her sina is working correctly  Patient advised to contact clinic regarding rescheduling    Dr. Bolton - please advise if lab results should be conveyed via TE - thanks

## 2023-10-11 ENCOUNTER — TELEMEDICINE (OUTPATIENT)
Dept: ENDOCRINOLOGY CLINIC | Facility: CLINIC | Age: 58
End: 2023-10-11

## 2023-10-11 DIAGNOSIS — E03.9 HYPOTHYROIDISM, UNSPECIFIED TYPE: Primary | ICD-10-CM

## 2023-10-11 PROCEDURE — 99214 OFFICE O/P EST MOD 30 MIN: CPT | Performed by: INTERNAL MEDICINE

## 2023-10-11 NOTE — PROGRESS NOTES
Please note that the following visit was completed using two-way, real-time interactive audio and video communication. This has been done in good keyshawn to provide continuity of care in the best interest of the provider-patient relationship, due to the ongoing public health crisis/national emergency and because of restrictions of visitation. There are limitations of this visit as no physical exam could be performed. Every conscious effort was taken to allow for sufficient and adequate time. This billing was spent on reviewing labs, medications, radiology tests and decision making. Appropriate medical decision-making and tests are ordered as detailed in the plan of care above. Follow-up- Reason for Visit:  Hypothyroidism and Hyponatremia  Requesting Physician: Dr. Sean Hallman. CHIEF COMPLAINT:  Fatigue     INITIAL VISIT:   Renate Sewell is a 63year old female who presented with hyponatremia, fatigue and polydipsia. She states that she would fill her water bottle many times a day (its about 30oz), drinks coffee in the morning and would have a coke at night. Five years ago, she was started on Adderal for fatigue. She was started on Doxepin and the Oxcarbazepine about two to three years ago. 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 We have been successful with limiting water intake to 60 ounces. Previously, I had decided to start her on a small dose of levothyroxine. She is doing very well on this. She may or may not have secondary hypothyroidism . The abnormality in the free T4 may or may not be due to the oxcarbazepine. A few visits ago, we had gone over her DEXA scan.   BMD measured at AP spine- 1.090 g/cm T-score- -0.8  BMD measured at Femur Left Neck- 0.723 g/cm T-score- -2.3  BMD measured at Femur Right Neck- 0.712 g/cm T-score- -2.3  BMD measured at Femur Total Left- 0.750 g/cm T-score- -2.0  BMD measured at Femur Total Right- 0.738 g/cm T-score- -2.1    FRAX calculated- risk of major osteoporotic fracture- 7.4 %; risk of hip fracture is 1.3 %    Calcium intake from dietary sources is poor, only from occasional dairy products  She does take a daily Ca and Vitamin D Supplement, and vitamin D level in October 2021 in was 48. However, takes PPI regularly. She has no history of early osteoporosis in family, no personal history of fractures, secondary osteoporosis, steroid use. She did stop menstruating at the age of 28, and started to have hot flashes after this. She had wanted to have children and went through fertility treatments and used donor eggs for this. Fatigue- improved, she feels very well today; she states that she has not been as good with drinking less water. At the last visit, we agreed to obtain a DEXA scan: 2/24/23  PROCEDURE: XR DEXA BONE DENSITOMETRY (CPT=77080)       COMPARISON: None. INDICATIONS: M85.89 Osteopenia of multiple sites       TECHNIQUE: Measurement of bone mineral density of the lumbar spine and hip was performed on a Hologic dual energy x-ray absorptiometry scanner. FINDINGS:       LEFT FEMORAL NECK   BMD: 0.569 gm/sq. cm. T SCORE: -2.5 Z SCORE: -1.4       LEFT TOTAL HIP   BMD: 0.701 gm/sq. cm. T SCORE: -2.0 Z SCORE: -1.2       PA LUMBAR SPINE (L1 - L4)   BMD: 1.059 gm/sq. cm. T SCORE: 0.1 Z SCORE: 1.3           T scores are a comparison to sex-matched patients with mean peak bone mass and are given in standard deviation (s.d.). Each 1 s.d. corresponds to approximately 10% below peak normal bone density. WORLD HEALTH ORGANIZATION CRITERIA   NORMAL T SCORE: Above -1 s.d.     OSTEOPENIA T SCORE: Between -1 and -2.5 s.d.     OSTEOPOROSIS T SCORE: -2.5 s.d.        National Osteoporosis Foundation Clinician's Guide to Prevention and Treatment of Osteoporosis recommendations for treatment:   Post menopausal women and men age 48 and older presenting with the following should be considered for treatment:   * A hip or vertebral (clinical or morphometric) fracture   * T score < -2.5 at the femoral neck or spine after appropriate evaluation to exclude secondary causes. * Low bone mass (T score between -1.0 and -2.5 at the femoral neck or spine) and a 10-year probability of a hip fracture > 3% or a 10-year probability of a major osteoporosis-related fracture > 20% based on the [place]-adapted WHO algorithm                   Impression   CONCLUSION:   1. Findings in the left femoral neck suggest osteoporosis, there is increased fracture risk. Findings in the total left hip suggest osteopenia, and there may be increased fracture risk. The 10 year fracture risk for major osteoporotic fracture is 8.8%,    and for hip fracture is 1.6%. 2. Findings in the total AP lumbar spine are in the normal range, and there is no increased fracture risk. She has been taking 50mcg of levothyroxine and has been doing very well on this. CURRENT MEDICATIONS:    Current Outpatient Medications   Medication Sig Dispense Refill    pantoprazole 40 MG Oral Tab EC Take 1 tablet (40 mg total) by mouth every 12 (twelve) hours. 180 tablet 3    Estradiol (VAGIFEM) 10 MCG Vaginal Tab Place 10 mcg vaginally once a week. SYNTHROID 25 MCG Oral Tab Take 1 tablet (25 mcg total) by mouth before breakfast. 90 tablet 1    fluticasone propionate 50 MCG/ACT Nasal Suspension 2 sprays by Nasal route daily. 3 each 1    ketoconazole 2 % External Shampoo as needed. triamcinolone 0.1 % External Lotion as needed. DULoxetine 30 MG Oral Cap DR Particles       traZODone 100 MG Oral Tab Take 1 tablet (100 mg total) by mouth nightly. Calcium Carbonate-Vitamin D (CALCIUM 600+D HIGH POTENCY OR) 2 TABLETS DAILY      cetirizine 10 MG Oral Tab 1 TABLET DAILY      Doxepin HCl 10 MG Oral Cap Take 3 capsules (30 mg total) by mouth nightly.  0    OXcarbazepine 300 MG Oral Tab Take 1 tablet (300 mg total) by mouth 3 (three) times daily.       ALPRAZolam 0.5 MG Oral Tab Take 1 tablet (0.5 mg total) by mouth nightly as needed for Sleep. amphetamine-dextroamphetamine 10 MG Oral Tab Take 1 tablet (10 mg total) by mouth daily. Takes Vitamin D3- 2,000 IU    ALLERGIES:    Seasonal                OTHER (SEE COMMENTS)    Comment:Sneezing, watery eyes.       ASSESSMENTS:     REVIEW OF SYSTEMS:  Constitutional: Negative for:  fever, cold/heat intolerance; does have weight gain and fatigue sometimes  Eyes: Negative for:  Visual changes, proptosis, blurring  ENT: Negative for:  dysphagia, neck swelling, dysphonia  Respiratory: Negative for:  dyspnea, cough  Cardiovascular: Negative for:  chest pain, palpitations, orthopnea  GI: Negative for:  abdominal pain, nausea, vomiting, diarrhea, constipation, bleeding  Neurology: Negative for: headache, numbness, weakness  Genito-Urinary: Negative for: dysuria, frequency  Psychiatric: Negative for:  depression, anxiety  Hematology/Lymphatics: Negative for: bruising, lower extremity edema  Endocrine: Negative for: polyuria, does have polydypsia  Skin: Negative for: rash, blister, cellulitis,    PHYSICAL EXAM:   Constitutional: she is not in acute distress, normal appearance, not ill appearing  HENT: Head: atraumatic, no obvious fullness apparent in the throat area  EYE: No scleral icterus; no eye discharge  Pulmonary:  Speaking in full sentences, pulmonary effort is normal  Neurological: Alert and oriented to person, place, and time  Psychiatric: Behavior is normal, normal affect  Skin: No visible lesions  Extremities: no obvious extremity swelling, no lesions    DATA: Date  sNa  sOSM  uOSM  Nikita  10/01/20 132  273  10/19/20 131  271  10/23/20 128  265  291  62  11/04/20 138  288    01/28/21 137  284     07/21/21 140  290   02/28/22 133  275   05/23/22 137   08/25/22 131   01/10/23 133     TFTs:  Date  TSH  FT4  LT4  10/19/20 0.961  0.8  10/28/20 0.771  0.7  12/14/20 0.457  0.7   01/28/21 0.674  1.0  07/21/21 0.524  0.8  10/29/21 0.806  0.8  02/28/22 0.512  0.9  08/25/22 0.236  1.0  09/29/22 0.934  1.1  01/10/23 0.816  1.0   09/25/23 0.157  1.0  50      01/10/23:  Vitamin D- 48.2           ASSESSMENT AND PLAN:  This is a 63year-old woman with a past history of severe depression, alcohol dependence who is here for follow-up of hyponatremia and possible hypothyroidism as well as osteoporosis. She is doing well. TSH is within normal limits. 1.) Hypothyroidism  - Decrease Synthroid from 50 to 42mcg by skipping dose 1 day of the week  - Check TSH and FT4 in 3 months    2.) Hyponatremia from SIADH due to oxcarbazepine  - patient is currently oxcarbazeine as it is really helping her  - patient to continue to restrict water intake to 60 ounces daily      Return to clinic in 3 months    Prior to this encounter, I spent over 15 minutes with preparing for the visit, including reviewing documents from other specialties as well as from PCP and going over test results and imaging studies. During the face to face encounter, I spent an additional 15 minutes which were determined for follow-up. Greater than 50% of the time was spent in counseling, anticipatory guidance, and coordination of care. Patient concerns were answered to the best of my knowledge. 10/11/23  Kaycee De Paz MD

## 2023-11-07 ENCOUNTER — TELEPHONE (OUTPATIENT)
Facility: CLINIC | Age: 58
End: 2023-11-07

## 2023-11-07 NOTE — TELEPHONE ENCOUNTER
Pt is calling requesting to speak with a RN in regards to her weight loss and other conditions. pt also stated she would like a sooner appt then what was offered. Please assist.

## 2023-11-07 NOTE — TELEPHONE ENCOUNTER
Dr Ally Younger: patient contacted. She is seeking to see you soon. Can a SDA slot be used? She is Off Monday and Wednesday. C/o Hair loss; has unexplained weight loss. Now weights 100 lb. She wants to see you to discuss her other consultations she had with specialists. Her last Px 3/2023;  she wasn't able to get a sooner appt than January.

## 2023-11-13 ENCOUNTER — TELEMEDICINE (OUTPATIENT)
Facility: CLINIC | Age: 58
End: 2023-11-13
Payer: COMMERCIAL

## 2023-11-13 ENCOUNTER — TELEPHONE (OUTPATIENT)
Facility: CLINIC | Age: 58
End: 2023-11-13

## 2023-11-13 VITALS — WEIGHT: 99 LBS | HEIGHT: 63 IN | BODY MASS INDEX: 17.54 KG/M2

## 2023-11-13 DIAGNOSIS — K20.0 EOSINOPHILIC ESOPHAGITIS: ICD-10-CM

## 2023-11-13 DIAGNOSIS — E03.9 ACQUIRED HYPOTHYROIDISM: ICD-10-CM

## 2023-11-13 DIAGNOSIS — U07.1 COVID-19 VIRUS INFECTION: ICD-10-CM

## 2023-11-13 DIAGNOSIS — R63.4 UNINTENTIONAL WEIGHT LOSS: Primary | ICD-10-CM

## 2023-11-13 LAB — AMB EXT COVID-19 RESULT: DETECTED

## 2023-11-13 RX ORDER — LEVOTHYROXINE SODIUM 50 MCG
50 TABLET ORAL
COMMUNITY
Start: 2023-11-13

## 2023-11-13 RX ORDER — ESTRADIOL 10 UG/1
10 INSERT VAGINAL WEEKLY
Qty: 12 TABLET | Refills: 1 | Status: SHIPPED | OUTPATIENT
Start: 2023-11-13

## 2023-11-13 RX ORDER — QUETIAPINE FUMARATE 50 MG/1
50 TABLET, FILM COATED ORAL NIGHTLY
COMMUNITY
Start: 2023-11-07

## 2023-11-13 NOTE — PROGRESS NOTES
CC:    Chief Complaint   Patient presents with    Weight Loss     Losing weight since last year. Not trying       HPI: 63year old female presenting for video visit to discuss unemotional weight loss. Tested positive for Covid-19 yesterday, which is why this is a virtual visit today. Does have a lot of phlegm and chest congestion currently, though denies significant shortness of breath. Did start having symptoms on Friday. Has continued to lose weight over the past year without trying. Does have a history of bulimia, but denies any recent relapses. Has been using a lot of CBD this year and cannabis as well, but will try to stop the cannabis. Has not been having fevers, chills, or night sweats. She did feel more anxious and hyper prior to starting on Seroquel by her psychiatrist, but now she feels too slowed down. Reports she does not eat a lot as she has dental issues and heartburn, and also does not have a very good appetite. Has not been able to smell or taste since having Covid-19 in 2020, and this has limited her desire for food and her appetite. Her endocrinologist did increase her Synthroid to 50 mcg a few weeks ago as she was feeling more tired. Does have a hard time swallowing when she is off her Pantoprazole, but had an EGD that showed mild gastritis, a hiatal hernia, and gastric polyps. Her biopsy results did show eosinophilic esophagitis.      ROS:  General:  No fevers/chills, no night sweats, no fatigue, unintentional weight loss  HEENT:  Denies congestion or nasal discharge, no dysphagia or odynophagia   Cardio:  No chest pain  Pulmonary:  Cough, no SOB  GI:  Looser bowel movements, no hematochezia or melena, intermittent abdominal pain   :  No discharge, no dysuria, no polyuria, no hematuria  Dermatologic:  No rashes    Past Medical History:   Diagnosis Date    Allergic rhinitis 30 years ago    Anemia     Anxiety my entire life    Asthma for a few years 12 years ago    Bipolar depression ([facility] [address]     Bulimia     Depression most of my life    Disorder of thyroid     hypothyroidism    Esophageal reflux     Hyperthyroidism I have some thyroid issue       Social History     Socioeconomic History    Marital status:      Spouse name: Not on file    Number of children: Not on file    Years of education: Not on file    Highest education level: Not on file   Occupational History    Not on file   Tobacco Use    Smoking status: Former    Smokeless tobacco: Never    Tobacco comments:     sneaky smoker for 10 years I quit 15 years ago   Vaping Use    Vaping Use: Some days    Substances: THC   Substance and Sexual Activity    Alcohol use: Not Currently    Drug use: Yes     Types: Cannabis, benzodiazepines, Amphetamines     Comment: occassionally takes a gummy and smokes marijuanna    Sexual activity: Yes     Partners: Male   Other Topics Concern    Caffeine Concern No    Exercise No    Seat Belt Yes    Special Diet No    Stress Concern No    Weight Concern No   Social History Narrative    Not on file     Social Determinants of Health     Financial Resource Strain: Not on file   Food Insecurity: Not on file   Transportation Needs: Not on file   Physical Activity: Not on file   Stress: Not on file   Social Connections: Not on file   Housing Stability: Not on file       Current Outpatient Medications   Medication Sig Dispense Refill    QUEtiapine 50 MG Oral Tab Take 1 tablet (50 mg total) by mouth nightly. SYNTHROID 50 MCG Oral Tab Take 1 tablet (50 mcg total) by mouth before breakfast.      Estradiol (VAGIFEM) 10 MCG Vaginal Tab Place 10 mcg vaginally once a week. 12 tablet 1    pantoprazole 40 MG Oral Tab EC Take 1 tablet (40 mg total) by mouth every 12 (twelve) hours. 180 tablet 3    fluticasone propionate 50 MCG/ACT Nasal Suspension 2 sprays by Nasal route daily. 3 each 1    ketoconazole 2 % External Shampoo as needed. triamcinolone 0.1 % External Lotion as needed.       DULoxetine 30 MG Oral Cap DR Particles       Calcium Carbonate-Vitamin D (CALCIUM 600+D HIGH POTENCY OR) 2 TABLETS DAILY      cetirizine 10 MG Oral Tab 1 TABLET DAILY      OXcarbazepine 300 MG Oral Tab Take 1 tablet (300 mg total) by mouth 2 (two) times daily. ALPRAZolam 0.5 MG Oral Tab Take 1 tablet (0.5 mg total) by mouth nightly as needed for Sleep. amphetamine-dextroamphetamine 10 MG Oral Tab Take 1 tablet (10 mg total) by mouth daily. Seasonal      Vitals:   Vitals:    11/13/23 1318   Weight: 99 lb (44.9 kg)   Height: 5' 3\" (1.6 m)         Physical:  General:  Alert, appropriate, no acute distress, A&O x 3  Pulmonary:  Speaking in clear and full sentences, no dyspnea   Psych: normal mood and affect     Assessment and Plan: 63year old female with current Covid-19 infection presenting for a video visit to discuss unintentional weight loss. 1. Unintentional weight loss    - Patient with several potential explanations of ongoing weight loss, though given the continued decline will check further labs, urine testing, and CT chest/abdomen/pelvis when she recovers from her current Covid-19 infection   - Sed Rate, Westergren (Automated) [E]; Future  - C-Reactive Protein [E]; Future  - Urinalysis, Routine [E][If pt <2 yrs old, must also order Reducing Substance ([id number])]; Future  - CT CHEST+ABDOMEN+PELVIS(ALL CNTRST ONLY)(HRL=[id number]); Future    2. COVID-19 virus infection    - Patient without any risk factors to qualify for Paxlovid, but discussed supportive care, quarantine/isolation guidelines, and warning signs/ED precautions     3. Eosinophilic esophagitis    - Referral to allergist given as this may be contributing to weight loss and dysphagia   - Allergy Referral - In Network    4. Acquired hypothyroidism    - Continue management per endocrinologist         Please note that the following visit was completed using two-way, real-time interactive audio and video communication.  This has been done in good keyshawn to provide continuity of care in the best interest of the provider-patient relationship, due to the ongoing public health crisis/national emergency and because of restrictions of visitation. There are limitations of this visit as no physical exam could be performed. Every conscious effort was taken to allow for sufficient and adequate time. This billing was spent on reviewing labs, medications, radiology tests and decision making. Appropriate medical decision-making and tests are ordered as detailed in the plan of care above.       Elma Reese DO  11/13/23  1:06 PM

## 2023-11-13 NOTE — TELEPHONE ENCOUNTER
Spoke to patient (name and  of patient verified). She feels she has mild cold symptoms, so tested for Covid. Denies shortness of breath or chest pain  Covid home test positive today- covid flag updated  She is requesting office visit is updated to virtual visit. Appointment updated. She states she will discuss concerns at today's appointment. NETpeas message sent.

## 2023-11-25 ENCOUNTER — ORDER TRANSCRIPTION (OUTPATIENT)
Dept: ADMINISTRATIVE | Facility: HOSPITAL | Age: 58
End: 2023-11-25

## 2023-11-25 DIAGNOSIS — Z12.31 ENCOUNTER FOR SCREENING MAMMOGRAM FOR MALIGNANT NEOPLASM OF BREAST: Primary | ICD-10-CM

## 2023-11-27 ENCOUNTER — TELEPHONE (OUTPATIENT)
Dept: NEUROLOGY | Facility: CLINIC | Age: 58
End: 2023-11-27

## 2023-11-27 ENCOUNTER — HOSPITAL ENCOUNTER (OUTPATIENT)
Dept: MAMMOGRAPHY | Age: 58
Discharge: HOME | End: 2023-11-27
Attending: FAMILY MEDICINE
Payer: COMMERCIAL

## 2023-11-27 DIAGNOSIS — Z12.31 ENCOUNTER FOR SCREENING MAMMOGRAM FOR MALIGNANT NEOPLASM OF BREAST: ICD-10-CM

## 2023-11-27 PROCEDURE — 77067 SCR MAMMO BI INCL CAD: CPT | Performed by: FAMILY MEDICINE

## 2023-11-27 PROCEDURE — 77063 BREAST TOMOSYNTHESIS BI: CPT | Performed by: FAMILY MEDICINE

## 2023-11-29 ENCOUNTER — TELEPHONE (OUTPATIENT)
Dept: NEUROLOGY | Facility: CLINIC | Age: 58
End: 2023-11-29

## 2023-11-29 NOTE — TELEPHONE ENCOUNTER
Patient called with the following question:  She has a f/u appointment on 12/6 with Dr. Watson Celestin  The soonest she can get her MRI is 12/11. Should she come to the 12/6 appointment OR would Dr. Watson Celestin be able  to \"squeeze\" her in in December after her MRI on 12/11?

## 2023-12-06 ENCOUNTER — TELEPHONE (OUTPATIENT)
Facility: CLINIC | Age: 58
End: 2023-12-06

## 2023-12-06 NOTE — TELEPHONE ENCOUNTER
The patient wanting recommendations from her provider for a place that does CT Scans. She needs one outside of [facility] due to her insurance.

## 2023-12-07 NOTE — TELEPHONE ENCOUNTER
LMTCB, please advise patient she will need to call her insurance for in-network locations to complete CT scans.

## 2023-12-19 ENCOUNTER — PATIENT MESSAGE (OUTPATIENT)
Dept: ENDOCRINOLOGY CLINIC | Facility: CLINIC | Age: 58
End: 2023-12-19

## 2023-12-20 ENCOUNTER — TELEPHONE (OUTPATIENT)
Dept: FAMILY MEDICINE CLINIC | Facility: CLINIC | Age: 58
End: 2023-12-20

## 2023-12-20 ENCOUNTER — LAB ENCOUNTER (OUTPATIENT)
Dept: LAB | Age: 58
End: 2023-12-20
Attending: FAMILY MEDICINE
Payer: COMMERCIAL

## 2023-12-20 DIAGNOSIS — E03.9 HYPOTHYROIDISM, UNSPECIFIED TYPE: ICD-10-CM

## 2023-12-20 DIAGNOSIS — R63.4 UNINTENTIONAL WEIGHT LOSS: ICD-10-CM

## 2023-12-20 LAB
BILIRUB UR QL: NEGATIVE
CLARITY UR: CLEAR
CRP SERPL-MCNC: <0.4 MG/DL (ref ?–1)
ERYTHROCYTE [SEDIMENTATION RATE] IN BLOOD: 2 MM/HR
GLUCOSE UR-MCNC: NORMAL MG/DL
HGB UR QL STRIP.AUTO: NEGATIVE
KETONES UR-MCNC: NEGATIVE MG/DL
LEUKOCYTE ESTERASE UR QL STRIP.AUTO: NEGATIVE
NITRITE UR QL STRIP.AUTO: NEGATIVE
PH UR: 7 [PH] (ref 5–8)
PROT UR-MCNC: NEGATIVE MG/DL
SP GR UR STRIP: 1.01 (ref 1–1.03)
T4 FREE SERPL-MCNC: 1.2 NG/DL (ref 0.8–1.7)
TSI SER-ACNC: 0.82 MIU/ML (ref 0.55–4.78)
UROBILINOGEN UR STRIP-ACNC: NORMAL

## 2023-12-20 PROCEDURE — 86140 C-REACTIVE PROTEIN: CPT

## 2023-12-20 PROCEDURE — 81003 URINALYSIS AUTO W/O SCOPE: CPT

## 2023-12-20 PROCEDURE — 85652 RBC SED RATE AUTOMATED: CPT

## 2023-12-20 PROCEDURE — 36415 COLL VENOUS BLD VENIPUNCTURE: CPT

## 2023-12-20 PROCEDURE — 84443 ASSAY THYROID STIM HORMONE: CPT

## 2023-12-20 PROCEDURE — 84439 ASSAY OF FREE THYROXINE: CPT

## 2023-12-20 RX ORDER — LEVOTHYROXINE SODIUM 50 MCG
50 TABLET ORAL
Qty: 90 TABLET | Refills: 3 | Status: SHIPPED | OUTPATIENT
Start: 2023-12-20

## 2023-12-20 RX ORDER — ESTRADIOL 10 UG/1
10 INSERT VAGINAL WEEKLY
Qty: 12 TABLET | Refills: 1 | Status: SHIPPED | OUTPATIENT
Start: 2023-12-20 | End: 2024-03-08

## 2023-12-20 RX ORDER — QUETIAPINE FUMARATE 50 MG/1
50 TABLET, FILM COATED ORAL NIGHTLY
Qty: 90 TABLET | Refills: 0 | OUTPATIENT
Start: 2023-12-20

## 2023-12-20 RX ORDER — LEVOTHYROXINE SODIUM 50 MCG
TABLET ORAL
Qty: 30 TABLET | Refills: 0 | Status: CANCELLED
Start: 2023-12-20

## 2023-12-20 NOTE — TELEPHONE ENCOUNTER
Incoming call from patient to inform she drop off her MRI disc at the [place] [address] location and she will like to take sure  reviews the MRI.

## 2023-12-20 NOTE — TELEPHONE ENCOUNTER
Refill passed per [facility] protocol.    Requested Prescriptions   Pending Prescriptions Disp Refills    QUEtiapine 50 MG Oral Tab  0     Sig: Take 1 tablet (50 mg total) by mouth nightly.       There is no refill protocol information for this order       Estradiol (VAGIFEM) 10 MCG Vaginal Tab 12 tablet 1     Sig: Place 10 mcg vaginally once a week.       Gynecology Medication Protocol Failed - 12/20/2023  3:41 PM        Failed - Pass dependent on manual look-up of last PAP and patient compliance with PAP follow up recommendations        Passed - In person appointment or virtual visit in the past 12 mos or appointment in next 3 mos     Recent Outpatient Visits              1 month ago Unintentional weight loss    [facility] Mercedes Rubio DO    Telemedicine    2 months ago Hypothyroidism, unspecified type    [facility] Kaycee Bolton MD    Telemedicine    5 months ago Short-term memory loss    [facility] Remi Gutierrez DO    Telemedicine    7 months ago Abnormal finding on MRI of brain    [facility] Remi Gutierrez DO    Office Visit    7 months ago Gastroesophageal reflux disease, unspecified whether esophagitis present    [facility] Karli Cody MD    Office Visit          Future Appointments         Provider Department Appt Notes    In 1 week Remi Gutierrez DO [facility]     In 1 week [facility] SLEEP ROOMS [facility] [address] test auth # [id number] from 12/15/23-1/15/24 Rep Pauline LEE    In 2 weeks Mercedes Rubio DO [facility] Yearly exam, estrogen use, weight loss, hair loss                Signed Prescriptions Disp Refills    SYNTHROID 50 MCG Oral Tab 90 tablet 3     Sig: Take 1 tablet (50 mcg total)  by mouth before breakfast.       Thyroid Medication Protocol Passed - 12/20/2023  3:41 PM        Passed - TSH in past 12 months        Passed - Last TSH value is normal     Lab Results   Component Value Date    TSH 0.820 12/20/2023                 Passed - In person appointment or virtual visit in the past 12 mos or appointment in next 3 mos     Recent Outpatient Visits              1 month ago Unintentional weight loss    [facility] Mercedes Rubio DO    Telemedicine    2 months ago Hypothyroidism, unspecified type    [facility] Kaycee Bolton MD    Telemedicine    5 months ago Short-term memory loss    [facility] Remi Gutierrez DO    Telemedicine    7 months ago Abnormal finding on MRI of brain    [facility] Remi Gutierrez DO    Office Visit    7 months ago Gastroesophageal reflux disease, unspecified whether esophagitis present    [facility] Karli Cody MD    Office Visit          Future Appointments         Provider Department Appt Notes    In 1 week Remi Gutierrez DO [facility]     In 1 week [facility] SLEEP ROOMS [facility] [address] test auth # [id number] from 12/15/23-1/15/24 Rep Pauline O    In 2 weeks Mercedes Rubio DO [facility] Yearly exam, estrogen use, weight loss, hair loss                  Future Appointments         Provider Department Appt Notes    In 1 week Remi Gutierrez DO [facility]     In 1 week [facility] SLEEP ROOMS [facility] [address] test auth # [id number] from 12/15/23-1/15/24 Rep Pauline O    In 2 weeks Mercedes Rubio DO [facility] Yearly exam, estrogen use, weight loss, hair loss           Recent Outpatient Visits              1 month ago Unintentional weight loss    [facility] Mercedes Rubio DO    Telemedicine    2 months ago Hypothyroidism, unspecified type    [facility] Kaycee Bolton MD    Telemedicine    5 months ago Short-term memory loss    [facility] Remi Gutierrez DO    Telemedicine    7 months ago Abnormal finding on MRI of brain    [facility] Remi Gutierrez DO    Office Visit    7 months ago Gastroesophageal reflux disease, unspecified whether esophagitis present    [facility] Karli Cody MD    Office Visit

## 2023-12-20 NOTE — TELEPHONE ENCOUNTER
Refill passed per [facility] protocol.    Requested Prescriptions   Pending Prescriptions Disp Refills    QUEtiapine 50 MG Oral Tab  0     Sig: Take 1 tablet (50 mg total) by mouth nightly.       There is no refill protocol information for this order       SYNTHROID 50 MCG Oral Tab  0     Sig: Take 1 tablet (50 mcg total) by mouth before breakfast.       Thyroid Medication Protocol Passed - 12/20/2023  3:41 PM        Passed - TSH in past 12 months        Passed - Last TSH value is normal     Lab Results   Component Value Date    TSH 0.820 12/20/2023                 Passed - In person appointment or virtual visit in the past 12 mos or appointment in next 3 mos     Recent Outpatient Visits              1 month ago Unintentional weight loss    [facility] Mercedes Rubio DO    Telemedicine    2 months ago Hypothyroidism, unspecified type    [facility] Kaycee Bolton MD    Telemedicine    5 months ago Short-term memory loss    [facility] Remi Gutierrez DO    Telemedicine    7 months ago Abnormal finding on MRI of brain    [facility] Remi Gutierrez DO    Office Visit    7 months ago Gastroesophageal reflux disease, unspecified whether esophagitis present    [facility] Karli Cody MD    Office Visit          Future Appointments         Provider Department Appt Notes    In 1 week Remi Gutierrez DO [facility]     In 1 week [facility] SLEEP ROOMS [facility] [address] test auth # [id number] from 12/15/23-1/15/24 Rep Pauline LEE    In 2 weeks Mercedes Rubio DO [facility] Yearly exam, estrogen use, weight loss, hair loss                 Estradiol (VAGIFEM) 10 MCG Vaginal Tab 12 tablet 1     Sig: Place 10 mcg vaginally  once a week.       Gynecology Medication Protocol Failed - 12/20/2023  3:41 PM        Failed - Pass dependent on manual look-up of last PAP and patient compliance with PAP follow up recommendations        Passed - In person appointment or virtual visit in the past 12 mos or appointment in next 3 mos     Recent Outpatient Visits              1 month ago Unintentional weight loss    [facility] Mercedes Rubio DO    Telemedicine    2 months ago Hypothyroidism, unspecified type    [facility] Kaycee Bolton MD    Telemedicine    5 months ago Short-term memory loss    [facility] Remi Gutierrez DO    Telemedicine    7 months ago Abnormal finding on MRI of brain    [facility] Remi Gutierrez DO    Office Visit    7 months ago Gastroesophageal reflux disease, unspecified whether esophagitis present    [facility] Karli Cody MD    Office Visit          Future Appointments         Provider Department Appt Notes    In 1 week Remi Gutierrez DO [facility]     In 1 week [facility] SLEEP [facility] [address] test auth # [id number] from 12/15/23-1/15/24 Rep Pauline O    In 2 weeks Mercedes Rubio DO [facility] Yearly exam, estrogen use, weight loss, hair loss                  Future Appointments         Provider Department Appt Notes    In 1 week Remi Gutierrez DO [facility]     In 1 week [facility] SLEEP [facility] [address] test auth # [id number] from 12/15/23-1/15/24 Rep Pauline O    In 2 weeks Mercedes Rubio DO [facility] Yearly exam, estrogen use, weight loss, hair loss          Recent Outpatient Visits              1  month ago Unintentional weight loss    [facility] Mercedes Rubio DO    Telemedicine    2 months ago Hypothyroidism, unspecified type    [facility] Kaycee Bolton MD    Telemedicine    5 months ago Short-term memory loss    [facility] Remi Gutierrez DO    Telemedicine    7 months ago Abnormal finding on MRI of brain    [facility] Remi Gutierrez DO    Office Visit    7 months ago Gastroesophageal reflux disease, unspecified whether esophagitis present    [facility] Karli Cody MD    Office Visit

## 2023-12-20 NOTE — TELEPHONE ENCOUNTER
1 month supply pended. See patient message.      Per Televisit 10/11/23:   Decrease Synthroid from 50 to 42mcg by skipping dose 1 day of the week  Check TSH and FT4 in 3 months

## 2023-12-20 NOTE — TELEPHONE ENCOUNTER
From: Daniel Villarreal  To: Kaycee Madera  Sent: 12/19/2023 7:16 PM CST  Subject: REFILL ASAP    Hello I need a refill ASAP!! I am going for another bloodtest within the next 7 days. I realize that is a problem, but I will run out of medicine.

## 2023-12-20 NOTE — TELEPHONE ENCOUNTER
Requested Prescriptions     Pending Prescriptions Disp Refills    pantoprazole 40 MG Oral Tab  tablet 3     Sig: Take 1 tablet (40 mg total) by mouth every 12 (twelve) hours.       LR: 7/31/2023  LOV: 4/25/2023

## 2023-12-20 NOTE — TELEPHONE ENCOUNTER
Please review; protocol failed.    Last office visit 3/29/23  Medication pended for your review / approval        Requested Prescriptions   Pending Prescriptions Disp Refills    QUEtiapine 50 MG Oral Tab  0     Sig: Take 1 tablet (50 mg total) by mouth nightly.       There is no refill protocol information for this order      Signed Prescriptions Disp Refills    SYNTHROID 50 MCG Oral Tab 90 tablet 3     Sig: Take 1 tablet (50 mcg total) by mouth before breakfast.       Thyroid Medication Protocol Passed - 12/20/2023  3:41 PM        Passed - TSH in past 12 months        Passed - Last TSH value is normal     Lab Results   Component Value Date    TSH 0.820 12/20/2023                 Passed - In person appointment or virtual visit in the past 12 mos or appointment in next 3 mos     Recent Outpatient Visits              1 month ago Unintentional weight loss    [facility] Mercedes Rubio DO    Telemedicine    2 months ago Hypothyroidism, unspecified type    [facility] Kaycee Bolton MD    Telemedicine    5 months ago Short-term memory loss    [facility] Remi Gutierrez DO    Telemedicine    7 months ago Abnormal finding on MRI of brain    [facility] Remi Gutierrez DO    Office Visit    7 months ago Gastroesophageal reflux disease, unspecified whether esophagitis present    [facility] Karli Cody MD    Office Visit          Future Appointments         Provider Department Appt Notes    In 1 week Remi Gutierrez DO [facility]     In 1 week [facility] SLEEP ROOMS [facility] [address] test auth # [id number] from 12/15/23-1/15/24 Rep Pauline LEE    In 2 weeks Mercedes Rubio DO [facility] Yearly exam, estrogen use, weight  loss, hair loss                 Estradiol (VAGIFEM) 10 MCG Vaginal Tab 12 tablet 1     Sig: Place 10 mcg vaginally once a week.       Gynecology Medication Protocol Failed - 12/20/2023  3:41 PM        Failed - Pass dependent on manual look-up of last PAP and patient compliance with PAP follow up recommendations        Passed - In person appointment or virtual visit in the past 12 mos or appointment in next 3 mos     Recent Outpatient Visits              1 month ago Unintentional weight loss    [facility] Mercedes Rubio DO    Telemedicine    2 months ago Hypothyroidism, unspecified type    [facility] Kaycee Bolton MD    Telemedicine    5 months ago Short-term memory loss    [facility] Remi Gutierrez DO    Telemedicine    7 months ago Abnormal finding on MRI of brain    [facility] Remi Gutierrez DO    Office Visit    7 months ago Gastroesophageal reflux disease, unspecified whether esophagitis present    [facility] Karli Cody MD    Office Visit          Future Appointments         Provider Department Appt Notes    In 1 week Remi Gutierrez DO [facility]     In 1 week [facility] SLEEP [facility] [address] test auth # [id number] from 12/15/23-1/15/24 Rep Pauline O    In 2 weeks Mercedes Rubio DO [facility] Yearly exam, estrogen use, weight loss, hair loss                  Future Appointments         Provider Department Appt Notes    In 1 week Remi Gutierrez DO [facility]     In 1 week [facility] SLEEP ROOMS [facility] [address] test auth # [id number] from 12/15/23-1/15/24 Rep Pauline O    In 2 weeks Mercedes Rubio DO [facility]  [facility] Yearly exam, estrogen use, weight loss, hair loss          Recent Outpatient Visits              1 month ago Unintentional weight loss    [facility] Mercedes Rubio DO    Telemedicine    2 months ago Hypothyroidism, unspecified type    [facility] Kaycee Bolton MD    Telemedicine    5 months ago Short-term memory loss    [facility] Remi Gutierrez DO    Telemedicine    7 months ago Abnormal finding on MRI of brain    [facility] Rem[facility] Gutierrez DO    Office Visit    7 months ago Gastroesophageal reflux disease, unspecified whether esophagitis present    [facility] Karli Cody MD    Office Visit

## 2023-12-20 NOTE — TELEPHONE ENCOUNTER
Dear RNs, Please review patient's chart for birth control / gyne medication refill request. Thank you    Patient sent separate mychart note saying she will do thyroid labs within next 7 days, almost out of meds.

## 2023-12-21 RX ORDER — PANTOPRAZOLE SODIUM 40 MG/1
40 TABLET, DELAYED RELEASE ORAL EVERY 12 HOURS
Qty: 180 TABLET | Refills: 3 | Status: SHIPPED | OUTPATIENT
Start: 2023-12-21

## 2023-12-22 ENCOUNTER — TELEPHONE (OUTPATIENT)
Dept: ENDOCRINOLOGY CLINIC | Facility: CLINIC | Age: 58
End: 2023-12-22

## 2023-12-22 NOTE — TELEPHONE ENCOUNTER
Dr. Bolton, see lab results. Losing weight, hair is breaking, asking if there are any other blood tests to do for this? All her other doctors are saying to check with endocrinology.    Component      Latest Ref Rng 12/20/2023   T4,Free (Direct)      0.8 - 1.7 ng/dL 1.2    TSH      0.550 - 4.780 mIU/mL 0.820        Synthroid 50mcg sent to [facility] on 12/20/23 by PCP.

## 2023-12-22 NOTE — TELEPHONE ENCOUNTER
Pt is calling for refill on thyroid medication.  She states that she has been trying to get it filled for a week.  She is currently out of medication.  See 12/19/23 [email or web address] message,  Pt is requesting to speak with RN.  Please call

## 2023-12-27 ENCOUNTER — TELEMEDICINE (OUTPATIENT)
Dept: NEUROLOGY | Facility: CLINIC | Age: 58
End: 2023-12-27
Payer: COMMERCIAL

## 2023-12-27 ENCOUNTER — OFFICE VISIT (OUTPATIENT)
Dept: SLEEP CENTER | Age: 58
End: 2023-12-27
Attending: Other
Payer: COMMERCIAL

## 2023-12-27 DIAGNOSIS — R93.89 ABNORMAL MRI: ICD-10-CM

## 2023-12-27 DIAGNOSIS — R41.3 SHORT-TERM MEMORY LOSS: ICD-10-CM

## 2023-12-27 DIAGNOSIS — R41.3 MEMORY IMPAIRMENT: ICD-10-CM

## 2023-12-27 DIAGNOSIS — R41.3 SHORT-TERM MEMORY LOSS: Primary | ICD-10-CM

## 2023-12-27 DIAGNOSIS — H91.8X2 OTHER SPECIFIED HEARING LOSS OF LEFT EAR, UNSPECIFIED HEARING STATUS ON CONTRALATERAL SIDE: ICD-10-CM

## 2023-12-27 DIAGNOSIS — R06.83 SNORING: ICD-10-CM

## 2023-12-27 PROCEDURE — 99212 OFFICE O/P EST SF 10 MIN: CPT | Performed by: OTHER

## 2023-12-27 NOTE — PROGRESS NOTES
I conducted a telehealth visit with Mike Lira today, 07/12/23, which was completed using two-way, real-time interactive audio and video communication. This has been done in good keyshawn to provide continuity of care in the best interest of the provider-patient relationship, due to the COVID -19 public health crisis/national emergency where restrictions of face-to-face office visits are ongoing. Every conscious effort was taken to allow for sufficient and adequate time to complete the visit. The patient was made aware of the limitations of the telehealth visit, including treatment limitations as no physical exam could be performed. The patient was advised to call 911 or to go to the ER in case there was an emergency. The patient was also advised of the potential privacy & security concerns related to the telehealth platform. The patient was made aware of where to find [facility] notice of privacy practices, telehealth consent form and other related consent forms and documents. which are located on the [facility] website. The patient verbally agreed to telehealth consent form, related consents and the risks discussed. Lastly, the patient confirmed that they were in [place]. Included in this visit, time may have been spent reviewing labs, medications, radiology tests and decision making. Appropriate medical decision-making and tests are ordered as detailed in the plan of care above. Coding/billing information is submitted for this visit based on complexity of care and/or time spent for the visit.         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 .     She was placed on oxcarbazepine for anxiety and depression. 12/27/23 Interval History/Subjective :     She had a mri of her c spine   Reviewed the images  No sign of a ms plaque        07/12/23 Interval History/Subjective :     She did not get the repeat mri done. Did not get the prior MRI uploaded. She said she did not know why the repeat  scan was ordered. Explained it was done to see if there was an interval change from the prior scan b/c the purpose of her visit was to see if her white matter changes were d/t multiple sclerosis. States she did not have her issues addressed; explained \"one issue, one visit\" and that I was happy to address her memory issues now. She reports  deficits in her long term memory \"her whole adult life. \" Reports when other ppl tell stories she can't tell stories about routine events/life when her kids were young or when she was young. Reports \"it is scary. \"    This has been going for 30 yrs. Looking back at old pictures may jog her memory but Cori Jackson does not remember the situation. Its not like others. I am not a human being. \"  Not getting worse. Now that she sis aging she reports short term memory loss. Also reports trepeditation/fear/anxiety about aging and death. No issues remembering her address or specific autobiographical facts. No family hx of dementia.  +snores. Does not wake up with headaches  She wakes up \"okay. Not obviously tired or obviously well rested. \"            ROS: Pertinent positive and negatives per HPI. All others were reviewed and negative.          Medications:  Current Outpatient Medications   Medication Instructions    ALPRAZolam (XANAX) 0.5 mg, Oral, Nightly PRN    amphetamine-dextroamphetamine 10 MG Oral Tab 10 mg, Oral, Daily    Calcium Carbonate-Vitamin D (CALCIUM 600+D HIGH POTENCY OR) 2 TABLETS DAILY    cetirizine 10 MG Oral Tab 1 TABLET DAILY    doxepin (SINEQUAN) 30 mg, Oral, Nightly    DULoxetine 30 MG Oral Cap DR Particles No dose, route, or frequency recorded. Estradiol (VAGIFEM) 10 mcg, Vaginal, Weekly    fluticasone propionate 50 MCG/ACT Nasal Suspension 2 sprays, Nasal, Daily    ketoconazole 2 % External Shampoo As needed    OXcarbazepine (TRILEPTAL) 300 mg, Oral, 3 times daily    pantoprazole (PROTONIX) 40 mg, Oral, Every morning before breakfast    Synthroid 25 mcg, Oral, Before breakfast    traZODone (DESYREL) 100 mg, Oral, Nightly    triamcinolone 0.1 % External Lotion As needed        Reviewed and assessed      Objective:    Last vitals and weight :  There is no height or weight on file to calculate BMI. There were no vitals filed for this visit. Exam:  Pt was seen while she was waiting for the video to connect. - General: appears stated age and no distress  - Pulmonary: Normal excursion of the chest.  No signs of respiratory distress. Neurologic Exam     - Mental Status: Alert and attentive. - Cranial Nerves: No gaze preference. Gaze is conjugate. EOMI. No nystagmus. No ptosis. No bitemporal atrophy. No masseter atrophy. No jaw deviation. .  No pathological facial asymmetry. No flattening of the nasolabial fold. .       - Motor:  normal tone/bulk. Motor Strength: moving all extremities symmetrically and equally. Tremor/Abnormal movements: No tremor/abnormal movements appreciated. - Cerebellum: No truncal ataxia. No titubations. Most recent lab results:   Reviewed and assessed  No results found for this or any previous visit (from the past 36 hour(s)). Diagnostic studies:       Assessment     Her long term memory loss/poor memory formation may be due to her anxiety and adhd. If the patient was not paying attention or preoccupied when the events were occurring then she will have poor recollection of them later. Mood disorders can also affect people memory.   Her short term memory loss may also be d/t her mood disorder, possible untreated JULIETH, and hearing loss. Her mri c spine does not support MS. Plan   1. Short-term memory loss  Mri c spine reviewed; no demyelinating plaques  She will go for the sleep study tonight. Education Provided  Education/Instructions given to: patient   Barriers to Learning:  possibly her memory  Content: Refer to note above   Evaluation/Outcome: Verbalized understanding    This document is not intended to support charting by exception. Sections left blank in a completed note should be presumed not to have been done. Disclaimer: This record was dictated using  [address]. There may be errors due to voice recognition problems that were not realized and corrected during the completion of the note. I spent 10 minutes counseling the patient regarding the findings of investigations, treatment options, risks and benefits of treatment, and management of care regarding the diagnosis above. Thank you for allowing me to participate in the care of your patient.     Alejandra Esquivel DO  12/27/23    9:19 AM

## 2023-12-28 ENCOUNTER — OFFICE VISIT (OUTPATIENT)
Dept: SLEEP CENTER | Age: 58
End: 2023-12-28
Attending: Other
Payer: COMMERCIAL

## 2023-12-28 DIAGNOSIS — H91.8X2 OTHER SPECIFIED HEARING LOSS OF LEFT EAR, UNSPECIFIED HEARING STATUS ON CONTRALATERAL SIDE: ICD-10-CM

## 2023-12-28 DIAGNOSIS — R06.83 SNORING: ICD-10-CM

## 2023-12-28 DIAGNOSIS — R41.3 MEMORY IMPAIRMENT: ICD-10-CM

## 2023-12-28 DIAGNOSIS — R93.89 ABNORMAL MRI: ICD-10-CM

## 2023-12-28 DIAGNOSIS — R41.3 SHORT-TERM MEMORY LOSS: Primary | ICD-10-CM

## 2023-12-28 PROCEDURE — 95806 SLEEP STUDY UNATT&RESP EFFT: CPT

## 2023-12-28 NOTE — TELEPHONE ENCOUNTER
Hi!    I did not refill medication because patient was supposed to see me in 3 months before refilling mediaction. I do not refill medications or answer questions over my chart. Please see last note. She was supposed to see me in 3 months. Please ask her to make appt if she has questions. Thank you.

## 2023-12-30 NOTE — PROCEDURES
[facility]  Accredited by the [facility] ([facility])    PATIENT'S NAME: Alvaro Taylor   ATTENDING PHYSICIAN: Krishna Schmidt DO   REFERRING PHYSICIAN: Junius Gosselin, DO   PATIENT ACCOUNT #: [de-identified] LOCATION: [address] [place] [address] RECORD #: [id number] YOB: 1965   DATE OF STUDY: 12/28/2023       SLEEP STUDY REPORT    STUDY TYPE:  Home sleep test.     INDICATION:  Suspected obstructive sleep apnea (ICD-10 code G47.33) in a patient with snoring, witnessed apneic events, nocturnal awakenings, family history of sleep apnea, memory impairment, an Jelm Sleepiness Scale score of 5/24, and a body mass index of 17.5. RESULTS:  The patient underwent a home sleep test with measurement of her nasal airflow, nasal air pressure, snoring, chest and abdominal wall motion, oximetry, and body position. I have reviewed the entirety of the raw data of this study. During this study the total recording time was 544 minutes. The lights-out clock time was 10:47 p.m., the lights-on clock time was 7:52 a.m. The apnea plus hypopnea index was 3.7 events per hour and the supine apnea plus hypopnea index was 7.2 events per hour. The average oxygen saturation was 96%, the lowest oxygen saturation was 89%, and the patient spent 0% of the testing with saturations of 88% or less. The average heart rate was 60 beats per minute and the patient spent approximately 30% of the test in the supine position. INTERPRETATION:  The data generated from this study shows no evidence of significant sleep-disordered breathing. Snoring was appreciated. RECOMMENDATIONS:    1. Clinical correlation is required. 2.   Avoid alcohol. 3.   Avoid sedating drug. 4.   Patient should not drive if at all sleepy. Please do not hesitate to contact me if there is any question whatsoever regarding the interpretation of this study.     Dictated By Keyur Rudolph MD  d: 12/30/2023 12:21:46  t: 12/30/2023 12:28:35  [facility] [id number]  TCQ/    cc: Beryle Provost, DO Ruta Carver.  DO Ramiro

## 2024-01-03 PROBLEM — J44.9 COPD (CHRONIC OBSTRUCTIVE PULMONARY DISEASE) (HCC): Status: ACTIVE | Noted: 2024-01-03

## 2024-01-08 ENCOUNTER — OFFICE VISIT (OUTPATIENT)
Facility: CLINIC | Age: 59
End: 2024-01-08
Payer: COMMERCIAL

## 2024-01-08 ENCOUNTER — LAB ENCOUNTER (OUTPATIENT)
Dept: LAB | Age: 59
End: 2024-01-08
Attending: FAMILY MEDICINE
Payer: COMMERCIAL

## 2024-01-08 VITALS
WEIGHT: 100 LBS | OXYGEN SATURATION: 98 % | BODY MASS INDEX: 18.4 KG/M2 | HEART RATE: 70 BPM | HEIGHT: 62 IN | SYSTOLIC BLOOD PRESSURE: 118 MMHG | DIASTOLIC BLOOD PRESSURE: 72 MMHG

## 2024-01-08 DIAGNOSIS — E55.9 VITAMIN D DEFICIENCY: ICD-10-CM

## 2024-01-08 DIAGNOSIS — K21.00 GASTROESOPHAGEAL REFLUX DISEASE WITH ESOPHAGITIS WITHOUT HEMORRHAGE: ICD-10-CM

## 2024-01-08 DIAGNOSIS — Z00.00 ENCOUNTER FOR ROUTINE ADULT HEALTH EXAMINATION WITHOUT ABNORMAL FINDINGS: Primary | ICD-10-CM

## 2024-01-08 DIAGNOSIS — M81.8 OTHER OSTEOPOROSIS WITHOUT CURRENT PATHOLOGICAL FRACTURE: ICD-10-CM

## 2024-01-08 DIAGNOSIS — L65.9 HAIR LOSS: ICD-10-CM

## 2024-01-08 DIAGNOSIS — Z86.19 H/O COLD SORES: ICD-10-CM

## 2024-01-08 DIAGNOSIS — Z23 NEED FOR VACCINATION: ICD-10-CM

## 2024-01-08 DIAGNOSIS — R63.4 WEIGHT LOSS: ICD-10-CM

## 2024-01-08 DIAGNOSIS — Z00.00 ENCOUNTER FOR ROUTINE ADULT HEALTH EXAMINATION WITHOUT ABNORMAL FINDINGS: ICD-10-CM

## 2024-01-08 DIAGNOSIS — N95.1 MENOPAUSAL SYMPTOMS: ICD-10-CM

## 2024-01-08 LAB
ALBUMIN SERPL-MCNC: 4.6 G/DL (ref 3.2–4.8)
ALBUMIN/GLOB SERPL: 1.7 {RATIO} (ref 1–2)
ALP LIVER SERPL-CCNC: 65 U/L
ALT SERPL-CCNC: 44 U/L
ANION GAP SERPL CALC-SCNC: 5 MMOL/L (ref 0–18)
AST SERPL-CCNC: 33 U/L (ref ?–34)
BASOPHILS # BLD AUTO: 0.04 X10(3) UL (ref 0–0.2)
BASOPHILS NFR BLD AUTO: 0.6 %
BILIRUB SERPL-MCNC: 0.3 MG/DL (ref 0.3–1.2)
BUN BLD-MCNC: 12 MG/DL (ref 9–23)
BUN/CREAT SERPL: 15.6 (ref 10–20)
CALCIUM BLD-MCNC: 9.6 MG/DL (ref 8.7–10.4)
CHLORIDE SERPL-SCNC: 98 MMOL/L (ref 98–112)
CHOLEST SERPL-MCNC: 219 MG/DL (ref ?–200)
CO2 SERPL-SCNC: 30 MMOL/L (ref 21–32)
CREAT BLD-MCNC: 0.77 MG/DL
DEPRECATED HBV CORE AB SER IA-ACNC: 47.1 NG/ML
DEPRECATED RDW RBC AUTO: 39.9 FL (ref 35.1–46.3)
EGFRCR SERPLBLD CKD-EPI 2021: 90 ML/MIN/1.73M2 (ref 60–?)
EOSINOPHIL # BLD AUTO: 0.08 X10(3) UL (ref 0–0.7)
EOSINOPHIL NFR BLD AUTO: 1.1 %
ERYTHROCYTE [DISTWIDTH] IN BLOOD BY AUTOMATED COUNT: 12.7 % (ref 11–15)
EST. AVERAGE GLUCOSE BLD GHB EST-MCNC: 108 MG/DL (ref 68–126)
FASTING PATIENT LIPID ANSWER: NO
FASTING STATUS PATIENT QL REPORTED: NO
GLOBULIN PLAS-MCNC: 2.7 G/DL (ref 2.8–4.4)
GLUCOSE BLD-MCNC: 96 MG/DL (ref 70–99)
HBA1C MFR BLD: 5.4 % (ref ?–5.7)
HCT VFR BLD AUTO: 38.9 %
HDLC SERPL-MCNC: 118 MG/DL (ref 40–59)
HGB BLD-MCNC: 13.8 G/DL
IMM GRANULOCYTES # BLD AUTO: 0.02 X10(3) UL (ref 0–1)
IMM GRANULOCYTES NFR BLD: 0.3 %
IRON SATN MFR SERPL: 19 %
IRON SERPL-MCNC: 64 UG/DL
LDLC SERPL CALC-MCNC: 92 MG/DL (ref ?–100)
LYMPHOCYTES # BLD AUTO: 1.38 X10(3) UL (ref 1–4)
LYMPHOCYTES NFR BLD AUTO: 19.7 %
MCH RBC QN AUTO: 30.7 PG (ref 26–34)
MCHC RBC AUTO-ENTMCNC: 35.5 G/DL (ref 31–37)
MCV RBC AUTO: 86.4 FL
MONOCYTES # BLD AUTO: 0.43 X10(3) UL (ref 0.1–1)
MONOCYTES NFR BLD AUTO: 6.2 %
NEUTROPHILS # BLD AUTO: 5.04 X10 (3) UL (ref 1.5–7.7)
NEUTROPHILS # BLD AUTO: 5.04 X10(3) UL (ref 1.5–7.7)
NEUTROPHILS NFR BLD AUTO: 72.1 %
NONHDLC SERPL-MCNC: 101 MG/DL (ref ?–130)
OSMOLALITY SERPL CALC.SUM OF ELEC: 276 MOSM/KG (ref 275–295)
PLATELET # BLD AUTO: 202 10(3)UL (ref 150–450)
POTASSIUM SERPL-SCNC: 4 MMOL/L (ref 3.5–5.1)
PROT SERPL-MCNC: 7.3 G/DL (ref 5.7–8.2)
RBC # BLD AUTO: 4.5 X10(6)UL
SODIUM SERPL-SCNC: 133 MMOL/L (ref 136–145)
TIBC SERPL-MCNC: 340 UG/DL (ref 250–425)
TRANSFERRIN SERPL-MCNC: 228 MG/DL
TRIGL SERPL-MCNC: 49 MG/DL (ref 30–149)
VIT D+METAB SERPL-MCNC: 48.7 NG/ML (ref 30–100)
VLDLC SERPL CALC-MCNC: 8 MG/DL (ref 0–30)
WBC # BLD AUTO: 7 X10(3) UL (ref 4–11)

## 2024-01-08 PROCEDURE — 83036 HEMOGLOBIN GLYCOSYLATED A1C: CPT

## 2024-01-08 PROCEDURE — 80053 COMPREHEN METABOLIC PANEL: CPT

## 2024-01-08 PROCEDURE — 84466 ASSAY OF TRANSFERRIN: CPT

## 2024-01-08 PROCEDURE — 82306 VITAMIN D 25 HYDROXY: CPT

## 2024-01-08 PROCEDURE — 82728 ASSAY OF FERRITIN: CPT

## 2024-01-08 PROCEDURE — 80061 LIPID PANEL: CPT

## 2024-01-08 PROCEDURE — 85025 COMPLETE CBC W/AUTO DIFF WBC: CPT

## 2024-01-08 PROCEDURE — 36415 COLL VENOUS BLD VENIPUNCTURE: CPT

## 2024-01-08 PROCEDURE — 83540 ASSAY OF IRON: CPT

## 2024-01-08 RX ORDER — VALACYCLOVIR HYDROCHLORIDE 500 MG/1
500 TABLET, FILM COATED ORAL DAILY
Qty: 90 TABLET | Refills: 3 | Status: SHIPPED | OUTPATIENT
Start: 2024-01-08

## 2024-01-08 NOTE — PROGRESS NOTES
HPI:   Ashley Graves is a 58 year old adult who presents for a complete physical exam.     Reports she has been vaping cannabis to help her sleep, and last smoked cigarettes about 20 years ago. Her CT chest did show signs of COPD, which was new to her. Since having Covid in 2020 she has not been able to smell, her weight has continued to decline, and she still can not taste. She will start allergy shots this week to try to help with the eosinophilic esophagitis. Reports she does not have a desire for food and things sometimes get stuck in her throat. Also can not taste or smell.   She is seeing a therapist and has a psychiatrist, and does not feel this is related to her eating disorder.   Has continued to have hair loss as well.   Has had hot flashes more recently, but does feel the vaginal dryness is much better with the vaginal estrogen.     Last pap: 3/2023 and normal   Last mammogram: 11/2023 and normal     Previous colonoscopy: 8/2021 and normal, told to repeat in 5 years   History of STD's: None  History of intimate partner violence: None  Family hx of breast, ovarian, cervical or colon CA: PGM with breast cancer, mom with colon cancer   Diet and exercise: Breakfast is usually cereal or yogurt. Occasionally has a protein bar or skips lunch. Will have Ensure in the morning. Dinner is home cooked, and typically smaller due to her diet restrictions. Has not been exercising, but she does walk regularly.    Immunizations:  Tdap: 2022, Flu: 11/2022, Pneumo: Has never had it, Shingles: Completed, Covid: Completed 2 doses    REVIEW OF SYSTEMS:   GENERAL: unintentional weight loss, hot flashes  SKIN: denies any unusual skin lesions, hair loss   EYES: no vision problems  ENT: dysphagia but no odynophagia, frequent cold sores   BREAST: no lumps or masses, no nipple discharge   LUNGS: denies shortness of breath  CARDIOVASCULAR: denies chest pain  GI: denies abdominal pain,  No constipation but chronic diarrhea, no  hematochezia  : denies dysuria, vaginal discharge or itching  NEURO: denies headaches, brain fog   PSYCHE: stable depression and anxiety           Current Outpatient Medications   Medication Sig Dispense Refill    valACYclovir 500 MG Oral Tab Take 1 tablet (500 mg total) by mouth daily. 90 tablet 3    pantoprazole 40 MG Oral Tab EC Take 1 tablet (40 mg total) by mouth every 12 (twelve) hours. 180 tablet 3    SYNTHROID 50 MCG Oral Tab Take 1 tablet (50 mcg total) by mouth before breakfast. 90 tablet 3    Estradiol (VAGIFEM) 10 MCG Vaginal Tab Place 10 mcg vaginally once a week. 12 tablet 1    QUEtiapine 50 MG Oral Tab Take 1 tablet (50 mg total) by mouth nightly.      fluticasone propionate 50 MCG/ACT Nasal Suspension 2 sprays by Nasal route daily. 3 each 1    triamcinolone 0.1 % External Lotion as needed.      DULoxetine 30 MG Oral Cap DR Particles       Calcium Carbonate-Vitamin D (CALCIUM 600+D HIGH POTENCY OR) 2 TABLETS DAILY      cetirizine 10 MG Oral Tab 1 TABLET DAILY      OXcarbazepine 300 MG Oral Tab Take 1 tablet (300 mg total) by mouth 2 (two) times daily.      ALPRAZolam 0.5 MG Oral Tab Take 1 tablet (0.5 mg total) by mouth nightly as needed for Sleep.      amphetamine-dextroamphetamine 10 MG Oral Tab Take 1 tablet (10 mg total) by mouth daily.       Allergies   Allergen Reactions    Seasonal OTHER (SEE COMMENTS)     Sneezing, watery eyes.       Past Medical History:   Diagnosis Date    Allergic rhinitis 30 years ago    Anemia     Anxiety my entire life    Asthma for a few years 12 years ago    Bipolar depression ([facility])     Bulimia     COPD (chronic obstructive pulmonary disease) ([facility]) 1/3/2024    Depression most of my life    Disorder of thyroid     hypothyroidism    Esophageal reflux     Hyperthyroidism I have some thyroid issue      Past Surgical History:   Procedure Laterality Date          COLONOSCOPY N/A 08/10/2021    Procedure: COLONOSCOPY;  Surgeon: Harinder Negron MD;  Location:  [facility] ENDOSCOPY    COLONOSCOPY      UPPER GI ENDOSCOPY,EXAM        Family History   Problem Relation Age of Onset    Colon Cancer Mother     Cancer Mother 60        colon cancer    Stroke Mother     Hypertension Father     Breast Cancer Paternal Grandmother         40-50's      Social History:   Social History     Socioeconomic History    Marital status:    Tobacco Use    Smoking status: Former     Types: Cigarettes     Quit date:      Years since quittin.0    Smokeless tobacco: Never    Tobacco comments:     sneaky smoker for 10 years    Vaping Use    Vaping Use: Some days    Substances: THC   Substance and Sexual Activity    Alcohol use: Not Currently     Comment: Sober since     Drug use: Yes     Types: Cannabis, benzodiazepines, Amphetamines     Comment: occassionally takes a gummy and smokes marijuanna    Sexual activity: Yes     Partners: Male   Other Topics Concern    Caffeine Concern No    Exercise No    Seat Belt Yes    Special Diet No    Stress Concern No    Weight Concern No     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 : Yes. Children: Triplet daughters (14 years old).         EXAM:     Wt Readings from Last 6 Encounters:   24 100 lb (45.4 kg)   23 99 lb (44.9 kg)   23 105 lb (47.6 kg)   23 108 lb (49 kg)   23 108 lb (49 kg)   23 108 lb (49 kg)     Body mass index is 18.29 kg/m².   /72   Pulse 70   Ht 5' 2\" (1.575 m)   Wt 100 lb (45.4 kg)   SpO2 98%   BMI 18.29 kg/m²     GENERAL: well developed, well nourished, in no apparent distress   SKIN: no rashes, no suspicious lesions  HEENT: atraumatic, normocephalic, throat clear; normal dentition  EYES: PERRLA, EOMI, conjunctiva are clear  NECK: supple, no adenopathy or thyroid masses   BREAST: no dominant or suspicious mass, no nipple discharge  LUNGS: clear to auscultation  CARDIO: RRR without murmur  GI: good bowel sounds, no masses, HSM or tenderness  : deferred, not due for pap  smear and no concerns  EXTREMITIES: no edema    Cholesterol, Total (mg/dL)   Date Value   05/05/2023 233 (H)   05/23/2022 201 (H)   07/21/2021 234 (H)     HDL Cholesterol (mg/dL)   Date Value   05/05/2023 120 (H)   05/23/2022 106 (H)   07/21/2021 91 (H)     LDL Cholesterol (mg/dL)   Date Value   05/05/2023 105 (H)   05/23/2022 84   07/21/2021 131 (H)      ASSESSMENT AND PLAN:   Ashley Graves is a 58 year old adult who presents for a complete physical exam.  Encounter Diagnoses   Name Primary?    Encounter for routine adult health examination without abnormal findings Yes    Gastroesophageal reflux disease with esophagitis without hemorrhage     Weight loss     Other osteoporosis without current pathological fracture     Vitamin D deficiency     Hair loss     H/O cold sores     Menopausal symptoms     Need for vaccination      Orders Placed This Encounter   Procedures    Ferritin [E]    Iron And Tibc [E]    Vitamin D [E]    Lipid Panel [E]    Hemoglobin A1C (Glycohemoglobin) [E]    Comp Metabolic Panel (14) [E]    CBC W Differential W Platelet [E]    PCV20 (Prevnar 20)     Continue Pantoprazole 40 mg twice daily for GERD with eosinophilic esophagitis, which has likely contributed to weight loss given dietary restrictions. Also to see GI to discuss management recommendations.  Recent CT chest/abdomen/pelvis negative for malignancy, and weight loss likely multifactorial, but to also check with her therapist to ensure this does not have to do with previous eating disorder.  Continue vitamin d supplementation due to osteoporosis, but also to discuss with endocrinologist other management options.  Check iron studies due to ongoing hair loss.  Start Valtrex 500 mg daily for prevention of cold sores.  Will continue vaginal estrogen for atrophic vaginitis, but also to discuss with gynecologist if she has a candidate for topical or oral HRT given symptoms.     Discussed with patient the following:  -Monthly breast  self-exam  -Breast cancer screening/mammograms and clinical breast exams  -Cervical cancer screening/pap smears  -Colon cancer screening/colonoscopy  -Adequate calcium and Vitamin D intake to prevent osteoporosis  -Bone density screening for osteopenia/osteoporosis  -Healthy diet including adequate intake of vegetables and fruits, appropriate portion sizes, minimizing highly concentrated carbohydrate foods  -Exercising 30 minutes a day most days of the week   -Diabetes screening which she desires  -Cholesterol screening which she desires  -Recommendation for yearly influenza vaccine  -Need for Tdap once as an adult and Td booster every 10 years  -Need for Zoster vaccine for patients >= 50  -STI screening (GC/Chlamydia/HIV): Not indicated  -Hepatitis C screening for all adults between the ages of 18 and 79: checked and negative       All questions were answered during the visit and the patient verbalizes understanding. She will return in one year for next WWE or sooner as needed.    Meds & Refills for this Visit:  Requested Prescriptions     Signed Prescriptions Disp Refills    valACYclovir 500 MG Oral Tab 90 tablet 3     Sig: Take 1 tablet (500 mg total) by mouth daily.       Imaging & Consults:  GASTRO - INTERNAL  PCV20 VACCINE FOR INTRAMUSCULAR USE    Mercedes Rubio DO  1/8/2024  2:38 PM

## 2024-01-09 ENCOUNTER — TELEPHONE (OUTPATIENT)
Dept: ENDOCRINOLOGY CLINIC | Facility: CLINIC | Age: 59
End: 2024-01-09

## 2024-01-09 NOTE — TELEPHONE ENCOUNTER
Dr. Bolton - please advise on below request.  Thank you.    Future Appointments   Date Time Provider Department Center   1/10/2024 11:30 AM Kaycee Bolton MD [facility]

## 2024-01-10 ENCOUNTER — TELEMEDICINE (OUTPATIENT)
Dept: ENDOCRINOLOGY CLINIC | Facility: CLINIC | Age: 59
End: 2024-01-10

## 2024-01-10 DIAGNOSIS — E55.9 VITAMIN D DEFICIENCY: ICD-10-CM

## 2024-01-10 DIAGNOSIS — M81.8 OTHER OSTEOPOROSIS WITHOUT CURRENT PATHOLOGICAL FRACTURE: Primary | ICD-10-CM

## 2024-01-10 DIAGNOSIS — E22.2 SIADH (SYNDROME OF INAPPROPRIATE ADH PRODUCTION) (HCC): ICD-10-CM

## 2024-01-10 DIAGNOSIS — E03.9 HYPOTHYROIDISM, UNSPECIFIED TYPE: ICD-10-CM

## 2024-01-10 PROCEDURE — 99214 OFFICE O/P EST MOD 30 MIN: CPT | Performed by: INTERNAL MEDICINE

## 2024-01-10 RX ORDER — ZOLEDRONIC ACID 5 MG/100ML
5 INJECTION, SOLUTION INTRAVENOUS ONCE
Qty: 100 ML | Refills: 0 | Status: SHIPPED | OUTPATIENT
Start: 2024-01-10 | End: 2024-01-10

## 2024-01-10 NOTE — PROGRESS NOTES
Please note that the following visit was completed using two-way, real-time interactive audio and video communication.  This has been done in good keyshawn to provide continuity of care in the best interest of the provider-patient relationship, due to the ongoing public health crisis/national emergency and because of restrictions of visitation.  There are limitations of this visit as no physical exam could be performed.  Every conscious effort was taken to allow for sufficient and adequate time.  This billing was spent on reviewing labs, medications, radiology tests and decision making.  Appropriate medical decision-making and tests are ordered as detailed in the plan of care above.    Follow-up- Reason for Visit:  Hypothyroidism and Hyponatremia  Requesting Physician: Dr. Hagan.    CHIEF COMPLAINT:  Fatigue     INITIAL VISIT:   Ashley Graves is a 58 year old female who presented with hyponatremia, fatigue and polydipsia. She states that she would fill her water bottle many times a day (its about 30oz), drinks coffee in the morning and would have a coke at night.   Five years ago, she was started on Adderal for fatigue.   She was started on Doxepin and the Oxcarbazepine about two to three years ago.    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 We have been successful with limiting water intake to 60 ounces. Previously, I had decided to start her on a small dose of levothyroxine. She is doing very well on this. She may or may not have secondary hypothyroidism . The abnormality in the free T4 may or may not be due to the oxcarbazepine.     A few visits ago, we had gone over her DEXA scan.  BMD measured at AP spine- 1.090 g/cm T-score- -0.8  BMD measured at Femur Left Neck- 0.723 g/cm T-score- -2.3  BMD measured at Femur Right Neck- 0.712 g/cm T-score- -2.3  BMD measured at Femur Total Left- 0.750 g/cm T-score- -2.0  BMD measured at Femur Total Right- 0.738 g/cm T-score- -2.1    FRAX calculated- risk of major  osteoporotic fracture- 7.4 %; risk of hip fracture is 1.3 %    Calcium intake from dietary sources is poor, only from occasional dairy products  She does take a daily Ca and Vitamin D Supplement, and vitamin D level in October 2021 in was 50. However, takes PPI regularly. She has no history of early osteoporosis in family, no personal history of fractures, secondary osteoporosis, steroid use.    She did stop menstruating at the age of 31, and started to have hot flashes after this. She had wanted to have children and went through fertility treatments and used donor eggs for this.     Fatigue- improved, she feels very well today; she states that she has not been as good with drinking less water.    At the last visit, we agreed to obtain a DEXA scan: 2/24/23  PROCEDURE: XR DEXA BONE DENSITOMETRY (CPT=77080)       COMPARISON: None.       INDICATIONS: M85.89 Osteopenia of multiple sites       TECHNIQUE: Measurement of bone mineral density of the lumbar spine and hip was performed on a Hologic dual energy x-ray absorptiometry scanner.       FINDINGS:       LEFT FEMORAL NECK   BMD: 0.569 gm/sq. cm. T SCORE: -2.5 Z SCORE: -1.4       LEFT TOTAL HIP   BMD: 0.701 gm/sq. cm. T SCORE: -2.0 Z SCORE: -1.2       PA LUMBAR SPINE (L1 - L4)   BMD: 1.059 gm/sq. cm. T SCORE: 0.1 Z SCORE: 1.3           T scores are a comparison to sex-matched patients with mean peak bone mass and are given in standard deviation (s.d.).  Each 1 s.d. corresponds to approximately 10% below peak normal bone density.         WORLD HEALTH ORGANIZATION CRITERIA   NORMAL T SCORE: Above -1 s.d.     OSTEOPENIA T SCORE: Between -1 and -2.5 s.d.     OSTEOPOROSIS T SCORE: -2.5 s.d.       National Osteoporosis Foundation Clinician's Guide to Prevention and Treatment of Osteoporosis recommendations for treatment:   Post menopausal women and men age 50 and older presenting with the following should be considered for treatment:   * A hip or vertebral (clinical or  morphometric) fracture   * T score < -2.5 at the femoral neck or spine after appropriate evaluation to exclude secondary causes.   * Low bone mass (T score between -1.0 and -2.5 at the femoral neck or spine) and a 10-year probability of a hip fracture > 3% or a 10-year probability of a major osteoporosis-related fracture > 20% based on the [place]-adapted WHO algorithm                   Impression   CONCLUSION:   1. Findings in the left femoral neck suggest osteoporosis, there is increased fracture risk.  Findings in the total left hip suggest osteopenia, and there may be increased fracture risk.  The 10 year fracture risk for major osteoporotic fracture is 8.8%,    and for hip fracture is 1.6%.   2. Findings in the total AP lumbar spine are in the normal range, and there is no increased fracture risk.                    She had been taking 50 mcg of levothyroxine and then we decreased it to 42mcg when we saw that TSH was suppressed.   She is still having problems with hair loss and is also interested in treating her osteoporosis.       CURRENT MEDICATIONS:    Current Outpatient Medications   Medication Sig Dispense Refill    valACYclovir 500 MG Oral Tab Take 1 tablet (500 mg total) by mouth daily. 90 tablet 3    pantoprazole 40 MG Oral Tab EC Take 1 tablet (40 mg total) by mouth every 12 (twelve) hours. 180 tablet 3    SYNTHROID 50 MCG Oral Tab Take 1 tablet (50 mcg total) by mouth before breakfast. 90 tablet 3    Estradiol (VAGIFEM) 10 MCG Vaginal Tab Place 10 mcg vaginally once a week. 12 tablet 1    QUEtiapine 50 MG Oral Tab Take 1 tablet (50 mg total) by mouth nightly.      fluticasone propionate 50 MCG/ACT Nasal Suspension 2 sprays by Nasal route daily. 3 each 1    triamcinolone 0.1 % External Lotion as needed.      DULoxetine 30 MG Oral Cap DR Particles       Calcium Carbonate-Vitamin D (CALCIUM 600+D HIGH POTENCY OR) 2 TABLETS DAILY      cetirizine 10 MG Oral Tab 1 TABLET DAILY      OXcarbazepine 300 MG Oral Tab  Take 1 tablet (300 mg total) by mouth 2 (two) times daily.      ALPRAZolam 0.5 MG Oral Tab Take 1 tablet (0.5 mg total) by mouth nightly as needed for Sleep.      amphetamine-dextroamphetamine 10 MG Oral Tab Take 1 tablet (10 mg total) by mouth daily.     Takes Vitamin D3- 2,000 IU    ALLERGIES:  Allergies   Allergen Reactions    Seasonal OTHER (SEE COMMENTS)     Sneezing, watery eyes.          ASSESSMENTS:     REVIEW OF SYSTEMS:  Constitutional: Negative for:  fever, cold/heat intolerance; does have weight gain and fatigue sometimes  Eyes: Negative for:  Visual changes, proptosis, blurring  ENT: Negative for:  dysphagia, neck swelling, dysphonia  Respiratory: Negative for:  dyspnea, cough  Cardiovascular: Negative for:  chest pain, palpitations, orthopnea  GI: Negative for:  abdominal pain, nausea, vomiting, diarrhea, constipation, bleeding  Neurology: Negative for: headache, numbness, weakness  Genito-Urinary: Negative for: dysuria, frequency  Psychiatric: Negative for:  depression, anxiety  Hematology/Lymphatics: Negative for: bruising, lower extremity edema  Endocrine: Negative for: polyuria, does have polydypsia  Skin: Negative for: rash, blister, cellulitis,    PHYSICAL EXAM:   Constitutional: she is not in acute distress, normal appearance, not ill appearing  HENT: Head: atraumatic, no obvious fullness apparent in the throat area  EYE: No scleral icterus; no eye discharge  Pulmonary:  Speaking in full sentences, pulmonary effort is normal  Neurological: Alert and oriented to person, place, and time  Psychiatric: Behavior is normal, normal affect  Skin: No visible lesions  Extremities: no obvious extremity swelling, no lesions    DATA:    Date  sNa  sOSM  uOSM  Nikita  10/01/20 132  273  10/19/20 131  271  10/23/20 128  265  291  62  11/04/20 138  288    01/28/21 137  284     07/21/21 140  290   02/28/22 133  275   05/23/22 137   08/25/22 131   01/10/23 133  01/08/24 133  276    TFTs:  Date  TSH  FT4  LT4  10/19/20 0.961  0.8  10/28/20 0.771  0.7  12/14/20 0.457  0.7   01/28/21 0.674  1.0  07/21/21 0.524  0.8  10/29/21 0.806  0.8  02/28/22 0.512  0.9  08/25/22 0.236  1.0  09/29/22 0.934  1.1  01/10/23 0.816  1.0   09/25/23 0.157  1.0  50  12/20/23 0.820  1.2  42      01/10/23:  Vitamin D- 48.2    01/08/24:  Vitamin D- 48.7           ASSESSMENT AND PLAN:  This is a 58 year-old woman with a past history of severe depression, alcohol dependence who is here for follow-up of hyponatremia and possible hypothyroidism as well as osteoporosis. She is doing well.     1.) Hypothyroidism. TSH is within normal limits, but I think that decreasing the dose from 42 to 25mcg will help her to gain weight and help with her hair health  - Decrease Synthroid from 42 to 25mcg   - Check TSH and FT4 in 3 months    2.) Hyponatremia from SIADH due to oxcarbazepine  - patient is currently oxcarbazeine as it is really helping her  - patient to continue to restrict water intake to 60 ounces daily    3.) Osteoporosis  - Start Reclast as patient has very severe GERD and takes pantoprazole  - Check DEXA scan in 2 years from Reclast      Return to clinic in 1 year    Prior to this encounter, I spent over 15 minutes with preparing for the visit, including reviewing documents from other specialties as well as from PCP and going over test results and imaging studies. During the face to face encounter, I spent an additional 15 minutes which were determined for follow-up. Greater than 50% of the time was spent in counseling, anticipatory guidance, and coordination of care. Patient concerns were answered to the best of my knowledge.       1/10/24  Kaycee Bolton MD     Spiral Flap Text: The defect edges were debeveled with a #15 scalpel blade.  Given the location of the defect, shape of the defect and the proximity to free margins a spiral flap was deemed most appropriate.  Using a sterile surgical marker, an appropriate rotation flap was drawn incorporating the defect and placing the expected incisions within the relaxed skin tension lines where possible. The area thus outlined was incised deep to adipose tissue with a #15 scalpel blade.  The skin margins were undermined to an appropriate distance in all directions utilizing iris scissors.

## 2024-01-23 ENCOUNTER — PATIENT MESSAGE (OUTPATIENT)
Dept: ENDOCRINOLOGY CLINIC | Facility: CLINIC | Age: 59
End: 2024-01-23

## 2024-01-23 NOTE — TELEPHONE ENCOUNTER
Dr. Bolton, is patient going to be on Reclast? Orders will need to be sent. Pended.     Per OV note 1/10/24: \"Start Reclast as patient has very severe GERD and takes pantoprazole\"    Mas, please start order form for Reclast and put in Dr. Bolton's folder.

## 2024-01-23 NOTE — TELEPHONE ENCOUNTER
From: Ashley Graves  To: Kaycee Garcia  Sent: 1/23/2024 1:01 PM CST  Subject: Infusion    Hi Dr garcia, I haven't heard from anyone regarding the infusion. Just following up.

## 2024-01-25 RX ORDER — ZOLEDRONIC ACID 5 MG/100ML
5 INJECTION, SOLUTION INTRAVENOUS ONCE
Qty: 100 ML | Refills: 0 | Status: SHIPPED | OUTPATIENT
Start: 2024-01-25 | End: 2024-01-25

## 2024-01-25 NOTE — TELEPHONE ENCOUNTER
Called patient to in from her that orders will be forwarded to MD to confirm. Patients weight is required for form. While on the phone with patient she confirmed she is 99 lbs.     Reclast forms were filled out and placed in providers folder for review and signature.

## 2024-02-01 PROBLEM — M81.0 SENILE OSTEOPOROSIS: Status: ACTIVE | Noted: 2024-02-01

## 2024-02-01 RX ORDER — ZOLEDRONIC ACID 5 MG/100ML
5 INJECTION, SOLUTION INTRAVENOUS ONCE
OUTPATIENT
Start: 2024-02-01

## 2024-02-13 ENCOUNTER — TELEPHONE (OUTPATIENT)
Dept: HEMATOLOGY/ONCOLOGY | Facility: HOSPITAL | Age: 59
End: 2024-02-13

## 2024-02-21 ENCOUNTER — NURSE ONLY (OUTPATIENT)
Dept: HEMATOLOGY/ONCOLOGY | Facility: HOSPITAL | Age: 59
End: 2024-02-21
Attending: INTERNAL MEDICINE
Payer: COMMERCIAL

## 2024-02-21 VITALS
BODY MASS INDEX: 19 KG/M2 | TEMPERATURE: 99 F | DIASTOLIC BLOOD PRESSURE: 34 MMHG | RESPIRATION RATE: 18 BRPM | OXYGEN SATURATION: 97 % | HEART RATE: 54 BPM | WEIGHT: 105.81 LBS | SYSTOLIC BLOOD PRESSURE: 100 MMHG

## 2024-02-21 DIAGNOSIS — M81.0 SENILE OSTEOPOROSIS: Primary | ICD-10-CM

## 2024-02-21 LAB
ALBUMIN SERPL-MCNC: 4.4 G/DL (ref 3.2–4.8)
CALCIUM BLD-MCNC: 9.6 MG/DL (ref 8.7–10.4)
CREAT BLD-MCNC: 0.79 MG/DL
EGFRCR SERPLBLD CKD-EPI 2021: 87 ML/MIN/1.73M2 (ref 60–?)

## 2024-02-21 PROCEDURE — 96365 THER/PROPH/DIAG IV INF INIT: CPT

## 2024-02-21 PROCEDURE — 82040 ASSAY OF SERUM ALBUMIN: CPT

## 2024-02-21 PROCEDURE — 82310 ASSAY OF CALCIUM: CPT

## 2024-02-21 PROCEDURE — 82565 ASSAY OF CREATININE: CPT

## 2024-02-21 RX ORDER — ZOLEDRONIC ACID 5 MG/100ML
5 INJECTION, SOLUTION INTRAVENOUS ONCE
Status: COMPLETED | OUTPATIENT
Start: 2024-02-21 | End: 2024-02-21

## 2024-02-21 RX ORDER — ZOLEDRONIC ACID 5 MG/100ML
5 INJECTION, SOLUTION INTRAVENOUS ONCE
Status: CANCELLED | OUTPATIENT
Start: 2024-02-21

## 2024-02-21 RX ORDER — ZOLEDRONIC ACID 5 MG/100ML
INJECTION, SOLUTION INTRAVENOUS
Status: COMPLETED
Start: 2024-02-21 | End: 2024-02-21

## 2024-02-21 RX ADMIN — ZOLEDRONIC ACID 5 MG: 5 INJECTION, SOLUTION INTRAVENOUS at 15:42:00

## 2024-02-21 NOTE — PROGRESS NOTES
Pt here for Reclast . Pt denies any dental/jaw issues or concerns. Procedure explained to pt and pt verbalized understanding.  Labs appropriate for treatment.    Reclast given as ordered and tolerated well.  Pt instructed to hydrate.     Ordering MD: Hoang       Pt tolerated infusion without difficulty or complaint.       Education Record  Learner:  Patient  Disease / Diagnosis: osteoporosis  Barriers / Limitations:  None  Method:  Discussion  General Topics:  Procedure and Plan of care reviewed  Outcome:  Shows understanding

## 2024-03-04 ENCOUNTER — PATIENT MESSAGE (OUTPATIENT)
Dept: ENDOCRINOLOGY CLINIC | Facility: CLINIC | Age: 59
End: 2024-03-04

## 2024-03-05 ENCOUNTER — TELEPHONE (OUTPATIENT)
Dept: ENDOCRINOLOGY CLINIC | Facility: CLINIC | Age: 59
End: 2024-03-05

## 2024-03-05 DIAGNOSIS — R52 PAIN: Primary | ICD-10-CM

## 2024-03-05 RX ORDER — PREDNISONE 20 MG/1
20 TABLET ORAL DAILY
Qty: 7 TABLET | Refills: 0 | Status: SHIPPED | OUTPATIENT
Start: 2024-03-05 | End: 2024-03-12

## 2024-03-05 NOTE — TELEPHONE ENCOUNTER
Patient states that had infusion treatment 1 week ago, and she has been sick every day since the treatment and she is also having severe pain.

## 2024-03-06 NOTE — TELEPHONE ENCOUNTER
Hi!  I am very sorry that she is having this pain. She should continue to take tylenol around the clock and I will also give her a short course of prednisone 20mg for 1 week. Thank you!

## 2024-03-06 NOTE — TELEPHONE ENCOUNTER
Dr. Bolton,  See message below - patient was prescribed prednisone 20mg daily x 7days for SE from Reclast - please advise -thanks

## 2024-03-06 NOTE — TELEPHONE ENCOUNTER
I called the patient and informed her of Dr. Bolton's recommendations. She will start prednisone in conjunction with OTC tylenol as directed on the bottle. Advised patient to notify office if she is not seeing improvement of symptoms in 2-3 days.

## 2024-03-06 NOTE — TELEPHONE ENCOUNTER
From: Ashley Graves  To: Kaycee Bolton  Sent: 3/4/2024 12:03 PM CST  Subject: Infusion     Hi Dr rodriguez that infusion feels like it has wrecked my body. I have been in constant pain in every bone in my body since that infusion. Right after it I had to take two days off of work and was sick all weekend and today I was off again. I have never felt so much pain in my bones since that infusion. Please tell me that can't be normal that something's wrong because I cannot live like this. I don't know if I should make an appointment with you but I never want to get that again. Look forward to hearing from you Ashley and I do want to talk about my medicine because I think maybe we should change it giovanna I'm still having problems.

## 2024-03-06 NOTE — TELEPHONE ENCOUNTER
Pt calling, she states that she took to first tablet and it is causing heart Palpitations.  She needs to know if she should stop Estradiol.  Paperwork given by pharmacy indicates there is an interaction between the 2 drugs.  Please call

## 2024-03-06 NOTE — TELEPHONE ENCOUNTER
Hi!  Please let patient know that I am sorry that she is having side effects from the prednisone. Please ask her to take half tablet instead of full tablet. She does not need to stop the estradiol. Thank you.

## 2024-03-08 ENCOUNTER — OFFICE VISIT (OUTPATIENT)
Dept: OBGYN CLINIC | Facility: CLINIC | Age: 59
End: 2024-03-08
Payer: COMMERCIAL

## 2024-03-08 VITALS
SYSTOLIC BLOOD PRESSURE: 130 MMHG | HEIGHT: 62 IN | WEIGHT: 98 LBS | BODY MASS INDEX: 18.03 KG/M2 | DIASTOLIC BLOOD PRESSURE: 70 MMHG

## 2024-03-08 DIAGNOSIS — N95.1 MENOPAUSAL SYMPTOMS: Primary | ICD-10-CM

## 2024-03-08 PROCEDURE — 3075F SYST BP GE 130 - 139MM HG: CPT | Performed by: OBSTETRICS & GYNECOLOGY

## 2024-03-08 PROCEDURE — 99213 OFFICE O/P EST LOW 20 MIN: CPT | Performed by: OBSTETRICS & GYNECOLOGY

## 2024-03-08 PROCEDURE — 3078F DIAST BP <80 MM HG: CPT | Performed by: OBSTETRICS & GYNECOLOGY

## 2024-03-08 PROCEDURE — 3008F BODY MASS INDEX DOCD: CPT | Performed by: OBSTETRICS & GYNECOLOGY

## 2024-03-08 RX ORDER — NORETHINDRONE ACETATE AND ETHINYL ESTRADIOL .5; 2.5 MG/1; UG/1
1 TABLET ORAL DAILY
Qty: 90 TABLET | Refills: 3 | Status: SHIPPED | OUTPATIENT
Start: 2024-03-08

## 2024-03-08 NOTE — PROGRESS NOTES
GYN H&P     3/8/2024  1:53 PM    CC: Patient is here for possible menopause management. Referred by Dr. Rubio    HPI: Patient is a 58 year old  with no period x 15 years.     She has been losing weight (10 lbs in the last year),  losing her hair, breaking hair, osteoporosis. . She recently changed her thyroid meds. She started vaginal estradiol tablet 1 x per week. No progesterone. She has been under a lot of stress and plans on getting . She is having rare hot flashes. She feels weak all the time and does not feel like herself.     Colonoscopy, pap and mammogram are UTD.       No LMP recorded. Patient is postmenopausal.    OB History    Para Term  AB Living   1 1           SAB IAB Ectopic Multiple Live Births                  # Outcome Date GA Lbr Marvel/2nd Weight Sex Delivery Anes PTL Lv   1 Para                GYN hx:               Past Medical History:   Diagnosis Date    Allergic rhinitis 30 years ago    Anemia     Anxiety my entire life    Asthma ([facility]) for a few years 12 years ago    Bipolar depression ([facility])     Bulimia ([facility])     COPD (chronic obstructive pulmonary disease) ([facility]) 1/3/2024    Depression most of my life    Disorder of thyroid     hypothyroidism    Esophageal reflux     Hyperthyroidism I have some thyroid issue     Past Surgical History:   Procedure Laterality Date          COLONOSCOPY N/A 08/10/2021    Procedure: COLONOSCOPY;  Surgeon: Harinder Negron MD;  Location: [facility] ENDOSCOPY    COLONOSCOPY      UPPER GI ENDOSCOPY,EXAM       Allergies   Allergen Reactions    Seasonal OTHER (SEE COMMENTS)     Sneezing, watery eyes.      Family History   Problem Relation Age of Onset    Colon Cancer Mother     Cancer Mother 60        colon cancer    Stroke Mother     Hypertension Father     Breast Cancer Paternal Grandmother         40-50's    Ovarian Cancer Neg      Social History     Socioeconomic History    Marital status:    Tobacco Use    Smoking status: Former      Types: Cigarettes     Quit date:      Years since quittin.1    Smokeless tobacco: Never    Tobacco comments:     sneaky smoker for 10 years    Vaping Use    Vaping Use: Some days    Substances: THC   Substance and Sexual Activity    Alcohol use: Not Currently     Comment: Sober since     Drug use: Yes     Types: Cannabis, benzodiazepines, Amphetamines     Comment: occassionally takes a gummy and smokes marijuanna    Sexual activity: Yes     Partners: Male     Social History     Social History Narrative    Not on file       Medications reviewed. See active list.     /70   Ht 62\"   Wt 98 lb (44.5 kg)   BMI 17.92 kg/m²       Exam:   GENERAL: well developed, well nourished, in no apparent distress        A/P: Patient is 58 year old female     1. Menopausal symptoms  - Norethindrone-Eth Estradiol 0.5-2.5 MG-MCG Oral Tab; Take 1 tablet by mouth daily.  Dispense: 90 tablet; Refill: 3    DW pt not typical symptoms of menopause. DW her HRT may help and to try for 1 M. DW her risk and SE.   Maria Elena Joseph MD

## 2024-03-11 ENCOUNTER — TELEPHONE (OUTPATIENT)
Dept: ENDOCRINOLOGY CLINIC | Facility: CLINIC | Age: 59
End: 2024-03-11

## 2024-03-11 DIAGNOSIS — R52 PAIN: Primary | ICD-10-CM

## 2024-03-11 RX ORDER — PREDNISONE 10 MG/1
10 TABLET ORAL DAILY
Qty: 14 TABLET | Refills: 0 | Status: SHIPPED | OUTPATIENT
Start: 2024-03-11 | End: 2024-03-11

## 2024-03-11 RX ORDER — PREDNISONE 10 MG/1
10 TABLET ORAL DAILY
Qty: 14 TABLET | Refills: 1 | Status: SHIPPED | OUTPATIENT
Start: 2024-03-11

## 2024-03-11 NOTE — TELEPHONE ENCOUNTER
Pt called to speak to RN about prednisone.  She states that it is working but if she works hard cleaning etc she is very sore afterwards.  Please call.

## 2024-03-11 NOTE — TELEPHONE ENCOUNTER
Called and spoke to patient, she reports she is taking the half tablet of Predisone and is tolerating it well.   She feels good during the day, but about 6:00pm the bone pain returns. She has about 4 days of Prednisone left.    Dr. Bolton, please review and advise. Patient would like to know if she can get another refill of the Prednisone or what can help alleviate the bone pain. Thank you.

## 2024-03-11 NOTE — TELEPHONE ENCOUNTER
Hi!  Please let patient know that the pain should subside after about 3-4 weeks of having the infusion. Thank you!

## 2024-03-14 ENCOUNTER — TELEPHONE (OUTPATIENT)
Dept: OBGYN CLINIC | Facility: CLINIC | Age: 59
End: 2024-03-14

## 2024-03-14 NOTE — TELEPHONE ENCOUNTER
Cheryl from [email or web address] is calling stating requesting to speak to RN regarding a medication patient was prescribed. Please follow up with Cheryl from [email or web address].    Reference number: [id number]    Phone number is: [phone] opt. [address]

## 2024-03-14 NOTE — TELEPHONE ENCOUNTER
RN spoke with pharmacy. Pharmacy wanted to make sure doctor is aware of the potential for Oxcarbazepine to reduce the efficacy of FemHRT. RN told pharmacy that MD is aware, and pharmacy should fill rx.

## 2024-04-05 ENCOUNTER — TELEPHONE (OUTPATIENT)
Dept: ENDOCRINOLOGY CLINIC | Facility: CLINIC | Age: 59
End: 2024-04-05

## 2024-04-05 DIAGNOSIS — E03.9 HYPOTHYROIDISM, UNSPECIFIED TYPE: Primary | ICD-10-CM

## 2024-04-05 RX ORDER — LEVOTHYROXINE SODIUM 50 MCG
50 TABLET ORAL
Qty: 90 TABLET | Refills: 3 | Status: SHIPPED | OUTPATIENT
Start: 2024-04-05

## 2024-04-05 NOTE — TELEPHONE ENCOUNTER
Refill passed per [facility] protocol.  Requested Prescriptions   Pending Prescriptions Disp Refills    SYNTHROID 50 MCG Oral Tab 90 tablet 3     Sig: Take 1 tablet (50 mcg total) by mouth before breakfast.       Thyroid Medication Protocol Passed - 4/4/2024 11:10 AM        Passed - TSH in past 12 months        Passed - Last TSH value is normal     Lab Results   Component Value Date    TSH 0.820 12/20/2023                 Passed - In person appointment or virtual visit in the past 12 mos or appointment in next 3 mos     Recent Outpatient Visits              4 weeks ago Menopausal symptoms    [facility] - OB/GYN Maria Elena Joseph MD    Office Visit    1 month ago Senile osteoporosis    [facility] - Infusion    Office Visit    1 month ago Senile osteoporosis    [facility] - Infusion    Nurse Only    2 months ago Other osteoporosis without current pathological fracture    [facility] Kaycee Bolton MD    Telemedicine    2 months ago Encounter for routine adult health examination without abnormal findings    [facility] Mercedes Rubio DO    Office Visit          Future Appointments         Provider Department Appt Notes    In 6 days FAVIO Kerr MD [facility] Weight loss & Gastroesophageal reflux disease with esophagitis without hemorrhage - transferring from 's care - 1/9/2024 jmb                  Recent Outpatient Visits              4 weeks ago Menopausal symptoms    [facility] - OB/GYN Maria Elena Joseph MD    Office Visit    1 month ago Senile osteoporosis    [facility] - Infusion    Office Visit    1 month ago Senile osteoporosis    [facility] - Infusion    Nurse Only    2 months ago Other  osteoporosis without current pathological fracture    [facility] Kaycee Bolton MD    Telemedicine    2 months ago Encounter for routine adult health examination without abnormal findings    [facility] Mercedes Rubio DO    Office Visit          Future Appointments         Provider Department Appt Notes    In 6 days FAVIO Kerr MD [facility] Weight loss & Gastroesophageal reflux disease with esophagitis without hemorrhage - transferring from 's care - 1/9/2024 yanna

## 2024-04-06 NOTE — TELEPHONE ENCOUNTER
Requested Renewals     Name from pharmacy: ESTRADIOLVAG VTB 10G         Will file in chart as: ESTRADIOL 10 MCG Vaginal Tab    The original prescription was discontinued on 3/8/2024 by Maria Elena Joseph MD. Renewing this prescription may not be appropriate.    Sig: INSERT 1 TABLET VAGINALLY  ONCE A WEEK    Disp: 8 tablet    Refills: 1    Start: 4/5/2024    Class: Normal    Non-formulary    Last ordered: 3 months ago (12/20/2023) by Mercedes Rubio DO    Last refill: 2/20/2024    Rx #: [id number]    Gynecology Medication Protocol Gzycna1804/05/2024 05:13 PM    PASS--PENDING LAST PAP WNL--VIA MANUAL LOOKUP    Mammogram in past 12 months    Physical or Pelvic/Breast in past 12 or next 3 mos--VIA MANUAL LOOKUP      To be filled at: [facility] - [place] [facility] - [address] [facility] AT Portal to [facility] Sites, [phone], [phone]

## 2024-04-08 RX ORDER — ESTRADIOL 10 UG/1
INSERT VAGINAL
Qty: 8 TABLET | Refills: 1 | OUTPATIENT
Start: 2024-04-08

## 2024-04-10 DIAGNOSIS — E03.9 HYPOTHYROIDISM, UNSPECIFIED TYPE: Primary | ICD-10-CM

## 2024-04-10 NOTE — TELEPHONE ENCOUNTER
Called pt and notified that per LOV 1/10/2024, RTC was in 1 year. Pt was to check TSH and FT4 in 3 months. Lab orders placed per written order. Pt will get labs done this week.

## 2024-04-10 NOTE — TELEPHONE ENCOUNTER
Pt calling for a 90 day refill to be sent to [facility] mail order Pharmacy.  Pt is almost out of medication, she requested the refill on 4/5/24 but for some reason it went to her PCP and it was denied.          SYNTHROID 50 MCG Oral Tab, Take 1 tablet (50 mcg total) by mouth before breakfast., Disp: 90 tablet, Rfl: 3

## 2024-04-11 ENCOUNTER — OFFICE VISIT (OUTPATIENT)
Facility: CLINIC | Age: 59
End: 2024-04-11
Payer: COMMERCIAL

## 2024-04-11 VITALS
BODY MASS INDEX: 18.43 KG/M2 | HEIGHT: 63 IN | WEIGHT: 104 LBS | SYSTOLIC BLOOD PRESSURE: 133 MMHG | DIASTOLIC BLOOD PRESSURE: 84 MMHG | HEART RATE: 76 BPM

## 2024-04-11 DIAGNOSIS — R10.11 RUQ PAIN: Primary | ICD-10-CM

## 2024-04-11 DIAGNOSIS — K21.9 GASTROESOPHAGEAL REFLUX DISEASE, UNSPECIFIED WHETHER ESOPHAGITIS PRESENT: ICD-10-CM

## 2024-04-11 PROCEDURE — 99214 OFFICE O/P EST MOD 30 MIN: CPT | Performed by: INTERNAL MEDICINE

## 2024-04-11 PROCEDURE — 3008F BODY MASS INDEX DOCD: CPT | Performed by: INTERNAL MEDICINE

## 2024-04-11 PROCEDURE — 3075F SYST BP GE 130 - 139MM HG: CPT | Performed by: INTERNAL MEDICINE

## 2024-04-11 PROCEDURE — 3079F DIAST BP 80-89 MM HG: CPT | Performed by: INTERNAL MEDICINE

## 2024-04-11 RX ORDER — TRAZODONE HYDROCHLORIDE 100 MG/1
TABLET ORAL
COMMUNITY
Start: 2024-03-28

## 2024-04-11 RX ORDER — LEVOTHYROXINE SODIUM 50 MCG
50 TABLET ORAL
Qty: 90 TABLET | Refills: 3 | OUTPATIENT
Start: 2024-04-11

## 2024-04-11 NOTE — PROGRESS NOTES
[facility] - Gastroenterology                                                                                                      Clinic Follow-up Visit    Chief Complaint   Patient presents with    Gastro-esophageal Reflux     Bloating          Subjective/HPI:   Ashley Graves is a 58 year old year-old adult here for fu of GERD.      -Patient with a prior history of bulimia for many years that was treated in her 30s  - She sees ENT for LPR  -She has lost 4-7 lbs over the past year, she thinks it was related to thyroid medication  -Mother had CRC in her 60s.    -Patient was concerned about PPI side effects and self discontinued Prevacid in 2023, subsequently started having dysphagia type symptoms and then saw Dr. Cody, had an upper endoscopy with results as below  - She is now taking pantoprazole twice a day 30 minutes before breakfast but sometimes not before meals and sometimes she takes it after meals  - Overall she feels good on pantoprazole but is worried about the long-term risks  - No chest pain or shortness of breath  - No melena  - No hematochezia  - No odynophagia or dysphagia  - No abdominal pain, does have some mild bloating symptoms  - Her weight loss has resolved        Prior endoscopies:  CLN 2021  1.  Small colon polyps  2.  Otherwise normal colonoscopy to the terminal ileum     EGD (2023)  1. Mild gastritis.   2. Gastric polyps. Likely fundic gland polyps. Biopsied.  3. 4 cm hiatal hernia.  4. Otherwise unremarkable EGD s/p biopsies of the esophagus, stomach, and duodenum.      Soc:  -former smoker  -denies Etoh  -no illicit drug use          Wt Readings from Last 6 Encounters:   04/11/24 104 lb (47.2 kg)   03/08/24 98 lb (44.5 kg)   02/21/24 105 lb 12.8 oz (48 kg)   01/08/24 100 lb (45.4 kg)   11/13/23 99 lb (44.9 kg)   07/24/23 105 lb (47.6 kg)        History, Medications, Allergies, ROS:      Past  Medical History:    Allergic rhinitis    Anemia    Anxiety    Asthma ([facility])    Bipolar depression ([facility])    Bulimia ([facility])    COPD (chronic obstructive pulmonary disease) ([facility])    Depression    Disorder of thyroid    hypothyroidism    Esophageal reflux    Hyperthyroidism      Past Surgical History:   Procedure Laterality Date          Colonoscopy N/A 08/10/2021    Procedure: COLONOSCOPY;  Surgeon: Harinder Negron MD;  Location: [facility] ENDOSCOPY    Colonoscopy      Upper gi endoscopy,exam  2023    Dr Cody - [facility]      Family History   Problem Relation Age of Onset    Colon Cancer Mother     Cancer Mother 60        colon cancer    Stroke Mother     Hypertension Father     Breast Cancer Paternal Grandmother         40-50's    Ovarian Cancer Neg       Social History:   Social History     Socioeconomic History    Marital status:    Tobacco Use    Smoking status: Former     Current packs/day: 0.00     Types: Cigarettes     Quit date:      Years since quittin.2    Smokeless tobacco: Never    Tobacco comments:     sneaky smoker for 10 years    Vaping Use    Vaping status: Some Days    Substances: THC   Substance and Sexual Activity    Alcohol use: Not Currently     Comment: Sober since     Drug use: Yes     Types: Cannabis, benzodiazepines, Amphetamines     Comment: occassionally takes a gummy and smokes marijuanna    Sexual activity: Yes     Partners: Male   Other Topics Concern    Caffeine Concern No    Exercise No    Seat Belt Yes    Special Diet No    Stress Concern No    Weight Concern No        Medications (Active prior to today's visit):  Current Outpatient Medications   Medication Sig Dispense Refill    traZODone 100 MG Oral Tab       SYNTHROID 50 MCG Oral Tab Take 1 tablet (50 mcg total) by mouth before breakfast. 90 tablet 3    Norethindrone-Eth Estradiol 0.5-2.5 MG-MCG Oral Tab Take 1 tablet by mouth daily. 90 tablet 3    valACYclovir 500 MG Oral Tab Take 1 tablet (500 mg total)  by mouth daily. 90 tablet 3    pantoprazole 40 MG Oral Tab EC Take 1 tablet (40 mg total) by mouth every 12 (twelve) hours. 180 tablet 3    triamcinolone 0.1 % External Lotion as needed.      DULoxetine 30 MG Oral Cap DR Particles       Calcium Carbonate-Vitamin D (CALCIUM 600+D HIGH POTENCY OR) 2 TABLETS DAILY      cetirizine 10 MG Oral Tab 1 TABLET DAILY      OXcarbazepine 300 MG Oral Tab Take 1 tablet (300 mg total) by mouth 2 (two) times daily.      ALPRAZolam 0.5 MG Oral Tab Take 1 tablet (0.5 mg total) by mouth nightly as needed for Sleep.      amphetamine-dextroamphetamine 10 MG Oral Tab Take 1 tablet (10 mg total) by mouth daily.      predniSONE 10 MG Oral Tab Take 1 tablet (10 mg total) by mouth daily. (Patient not taking: Reported on 4/11/2024) 14 tablet 1       Allergies:  Allergies   Allergen Reactions    Seasonal OTHER (SEE COMMENTS)     Sneezing, watery eyes.        ROS:   CONSTITUTIONAL:  negative for fevers, chills  EYES:  negative for change in vision  RESPIRATORY:  negative for  shortness of breath  CARDIOVASCULAR:  negative for  chest pain  GASTROINTESTINAL:  see HPI  GENITOURINARY:  negative for dysuria  INTEGUMENT/BREAST:  SKIN:  negative for  rash  ALLERGIC/IMMUNOLOGIC:  negative for hay fever  ENDOCRINE:  negative for cold intolerance and heat intolerance  MUSCULOSKELETAL:  negative for  joint stiffness and joint swelling  BEHAVIOR/PSYCH:  negative for depressed mood    PHYSICAL EXAM:   Blood pressure 133/84, pulse 76, height 5' 3\" (1.6 m), weight 104 lb (47.2 kg).    Gen- Patient appears comfortable and in no acute discomfort  HEENT: the sclera appears anicteric, oropharynx clear, mucus membranes appear moist  CV- regular rate and rhythm, the extremities are warm and well perfused   Lung- Moves air well; No labored breathing  Abdomen- soft, non-tender exam in all quadrants without rigidity or guarding, non-distended, no abnormal bowel sounds noted, no masses are palpated  Skin- No  jaundice  Ext: no cyanosis, clubbing or edema is evident.   Neuro- Alert and oriented x4, and patient is having movement of all 4 extremities   Psych - appropriate, non-agitated    Labs/Imaging:     Patient's labs and imaging were reviewed and discussed with patient today.     .  ASSESSMENT/PLAN:   Ashley Graves is a 58 year old year-old adult here for fu of GERD.    #GERD - The pathophysiology of acid reflux was discussed. Anti-reflux measures such as raising the head of the bed, avoiding tight clothing or belts, avoiding eating late at night and not lying down shortly after mealtime and achieving weight loss were discussed. Avoid NSAID's, caffeine, peppermints, alcohol and tobacco. Recommend continuing ASA if recommended by primary care.  The patient was instructed to alert me if there are persistent symptoms, such as  dysphagia, weight loss or GI bleeding. OTC H2 blockers and/or antacids are often very helpful for PRN use. For persisting chronic or daily symptoms, prescription strength H2 blockers or PPI's may be used.   >>r/out gallstones as atypical presentation of GERD like sx  >>> We discussed the long-term risks and benefits of taking pantoprazole, she will continue.    #Hiatal hernia-patient's mom is going for hiatal hernia surgery, patient has a 4 cm sliding hiatal hernia, contributing to her symptoms.    #Screening - due in 2026    Try to reduce to pantoprazole 40mg/day, 30 min before dinner, and ADD liquid gaviscon as needed 1-2x a day. Okay to use pantoprazole twice a day if needed.  Avoid caffeine, chocolate, peppermint, and alcohol. Also avoid lying down flat or in a recumbent position for 3 hours after a meal.   RUQ ultrasound  Plan for EGD/CLN in 2026      Orders This Visit:  No orders of the defined types were placed in this encounter.      Meds This Visit:  Requested Prescriptions      No prescriptions requested or ordered in this encounter       Imaging & Referrals:  US ABDOMEN LIMITED  (CPT=76705)     4/11/2024    AFLIE Kerr MD  Pager: [phone]        This note was partially prepared using Dragon Medical voice recognition dictation software. As a result, errors may occur. When identified, these errors have been corrected. While every attempt is made to correct errors during dictation, discrepancies may still exist.

## 2024-04-11 NOTE — PATIENT INSTRUCTIONS
Try to reduce to pantoprazole 40mg/day, 30 min before dinner, and ADD liquid gaviscon as needed 1-2x a day. Okay to use pantoprazole twice a day if needed.  Avoid caffeine, chocolate, peppermint, and alcohol. Also avoid lying down flat or in a recumbent position for 3 hours after a meal.   RUQ ultrasound

## 2024-04-22 NOTE — TELEPHONE ENCOUNTER
Dr. Bolton,  See message below  Spoke to patient - she is agreeable to trying Unithroid   RX pended for approval -thanks

## 2024-04-22 NOTE — TELEPHONE ENCOUNTER
Patient states that [facility] told her that they will be no longer making Synthroid.  Patient states that levothyroxine did not agree with her previously.  She states she would be willing to try it again.  Please call.

## 2024-04-23 RX ORDER — LEVOTHYROXINE SODIUM 0.05 MG/1
50 TABLET ORAL
Qty: 90 TABLET | Refills: 3 | Status: SHIPPED | OUTPATIENT
Start: 2024-04-23 | End: 2024-04-24

## 2024-04-24 DIAGNOSIS — E03.9 HYPOTHYROIDISM, UNSPECIFIED TYPE: ICD-10-CM

## 2024-04-25 ENCOUNTER — TELEPHONE (OUTPATIENT)
Facility: CLINIC | Age: 59
End: 2024-04-25

## 2024-04-25 RX ORDER — LEVOTHYROXINE SODIUM 0.05 MG/1
50 TABLET ORAL
Qty: 90 TABLET | Refills: 3 | Status: SHIPPED | OUTPATIENT
Start: 2024-04-25

## 2024-04-25 RX ORDER — PANTOPRAZOLE SODIUM 40 MG/1
40 TABLET, DELAYED RELEASE ORAL
Qty: 180 TABLET | Refills: 3 | Status: SHIPPED | OUTPATIENT
Start: 2024-04-25 | End: 2025-04-20

## 2024-04-25 NOTE — TELEPHONE ENCOUNTER
Endocrine refill protocol for medications for hypothyroidism and hyperthyroidism    Protocol Criteria:  Appointment with Endocrinology completed in the last 12 months or scheduled in the next  6months     Verify appointment has been completed or scheduled in the appropriate timeline. If so can send a 90 day supply with 1 refill per provider protocol.    Normal TSH result in the past 12 months   Review recent telephone encounters and mychart communications with patient to ensure a dose change has not occurred since last office visit that was not updated in the medication history list   Last completed office visit:01/10/24  Next scheduled Follow up: no f/u  Last TSH result: 0.820

## 2024-04-30 ENCOUNTER — TELEPHONE (OUTPATIENT)
Dept: ENDOCRINOLOGY CLINIC | Facility: CLINIC | Age: 59
End: 2024-04-30

## 2024-04-30 ENCOUNTER — TELEPHONE (OUTPATIENT)
Facility: CLINIC | Age: 59
End: 2024-04-30

## 2024-04-30 ENCOUNTER — LAB ENCOUNTER (OUTPATIENT)
Dept: LAB | Age: 59
End: 2024-04-30
Attending: INTERNAL MEDICINE
Payer: COMMERCIAL

## 2024-04-30 DIAGNOSIS — E03.9 HYPOTHYROIDISM, UNSPECIFIED TYPE: ICD-10-CM

## 2024-04-30 DIAGNOSIS — R42 DIZZINESS: Primary | ICD-10-CM

## 2024-04-30 DIAGNOSIS — R42 DIZZINESS: ICD-10-CM

## 2024-04-30 LAB
ANION GAP SERPL CALC-SCNC: 6 MMOL/L (ref 0–18)
BUN BLD-MCNC: 15 MG/DL (ref 9–23)
BUN/CREAT SERPL: 18.8 (ref 10–20)
CALCIUM BLD-MCNC: 9.5 MG/DL (ref 8.7–10.4)
CHLORIDE SERPL-SCNC: 106 MMOL/L (ref 98–112)
CO2 SERPL-SCNC: 27 MMOL/L (ref 21–32)
CREAT BLD-MCNC: 0.8 MG/DL
EGFRCR SERPLBLD CKD-EPI 2021: 86 ML/MIN/1.73M2 (ref 60–?)
GLUCOSE BLD-MCNC: 90 MG/DL (ref 70–99)
OSMOLALITY SERPL CALC.SUM OF ELEC: 288 MOSM/KG (ref 275–295)
POTASSIUM SERPL-SCNC: 4.2 MMOL/L (ref 3.5–5.1)
SODIUM SERPL-SCNC: 139 MMOL/L (ref 136–145)
T4 FREE SERPL-MCNC: 1.1 NG/DL (ref 0.8–1.7)
TSI SER-ACNC: 0.92 MIU/ML (ref 0.55–4.78)

## 2024-04-30 PROCEDURE — 80048 BASIC METABOLIC PNL TOTAL CA: CPT | Performed by: FAMILY MEDICINE

## 2024-04-30 NOTE — TELEPHONE ENCOUNTER
Patient called, verified Name and . Reports she came in for blood work (TSH and Free T4) today ordered by Dr. Bolton. States usually orders BMP as well but patient is not sure why provider did not order it this time.     She is requesting to have her sodium level checked as she has not been feeling well, lightheaded. Last sodium level 133 on . Also see Endocrinology telephone encounter today - Orders Call.    Dr. oBlton is out of the office today and patient was advised to contact PCP.    Dr. Rubio please advise if you are able to order this for the patient. We will notify lab to add it on if approved.

## 2024-04-30 NOTE — TELEPHONE ENCOUNTER
Dr Bolton,    Please see message below.     Per patient she has been feeling sick for the last 4-5 days. \"I feel lightheaded, no energy and can't lift my head\".     Reports feeling the same way as before when her Sodium was low. Reports thirst. Unsure if she has increased water intake. Has lost weight from 99 to105 lbs.     Oxcarbazepine prescription was increased one month ago.    Component      Latest Ref Rng 1/8/2024   Sodium      136 - 145 mmol/L 133 (L) \        Thyroid labs completed today, still in process.     Please advise, thanks.

## 2024-04-30 NOTE — TELEPHONE ENCOUNTER
Patient calling regards placing an order for labs to check her sodium. States has not been feeling well. States needs to get placed asap. Please call.

## 2024-05-24 ENCOUNTER — TELEPHONE (OUTPATIENT)
Facility: CLINIC | Age: 59
End: 2024-05-24

## 2024-05-24 NOTE — TELEPHONE ENCOUNTER
1st,overdue reminder letter mailed out to patient  Imaging order    US ABDOMEN LIMITED (CPT=76705) (Order #[id number]) on 4/11/24

## 2024-07-01 ENCOUNTER — TELEPHONE (OUTPATIENT)
Facility: CLINIC | Age: 59
End: 2024-07-01

## 2024-07-01 NOTE — TELEPHONE ENCOUNTER
1st,overdue reminder letter mailed out to patient    Imaging order      US ABDOMEN LIMITED (CPT=76705) (Order #[id number]) on 4/11/24

## 2024-08-09 RX ORDER — LEVOTHYROXINE SODIUM 50 MCG
50 TABLET ORAL
Qty: 90 TABLET | Refills: 3 | Status: CANCELLED | OUTPATIENT
Start: 2024-08-09

## 2024-08-13 RX ORDER — LEVOTHYROXINE SODIUM 50 MCG
50 TABLET ORAL
Qty: 10 TABLET | Refills: 0 | Status: SHIPPED | OUTPATIENT
Start: 2024-08-13 | End: 2024-08-23

## 2024-08-13 NOTE — TELEPHONE ENCOUNTER
Refill passed per [facility] protocols.    Patient has refills at mailorder. Requesting short supply to [facility].    Requested Prescriptions   Pending Prescriptions Disp Refills    SYNTHROID 50 MCG Oral Tab 10 tablet 0     Sig: Take 1 tablet (50 mcg total) by mouth before breakfast.       Thyroid Medication Protocol Passed - 8/13/2024  5:43 PM        Passed - TSH in past 12 months        Passed - Last TSH value is normal     Lab Results   Component Value Date    TSH 0.924 04/30/2024                 Passed - In person appointment or virtual visit in the past 12 mos or appointment in next 3 mos     Recent Outpatient Visits              4 months ago RUQ pain    [facility] FAVIO Kerr MD    Office Visit    5 months ago Menopausal symptoms    [facility] - OB/GYN Maria Elena Joseph MD    Office Visit    5 months ago Senile osteoporosis    [facility]    Office Visit    5 months ago Senile osteoporosis    [facility]    Nurse Only    7 months ago Other osteoporosis without current pathological fracture    [facility] [place] Kaycee Bolton MD    Telemedicine          Future Appointments         Provider Department Appt Notes    In 2 weeks [address] - [place]

## 2024-08-13 NOTE — TELEPHONE ENCOUNTER
Outpatient Medication Detail     Disp Refills Start End    SYNTHROID 50 MCG Oral Tab 90 tablet 3 4/5/2024 --    Sig - Route: Take 1 tablet (50 mcg total) by mouth before breakfast. - Oral    Sent to pharmacy as: Synthroid 50 MCG Oral Tablet    E-Prescribing Status: Receipt confirmed by pharmacy (4/5/2024 11:00 AM CDT)      Pharm[facility]    [facility] [place] - [address] [facility] AT PORTAL TO [facility] SITES, [phone], [phone]

## 2024-08-30 ENCOUNTER — HOSPITAL ENCOUNTER (OUTPATIENT)
Dept: ULTRASOUND IMAGING | Age: 59
Discharge: HOME | End: 2024-08-30
Attending: INTERNAL MEDICINE
Payer: COMMERCIAL

## 2024-08-30 DIAGNOSIS — R10.11 RUQ PAIN: ICD-10-CM

## 2024-08-30 PROCEDURE — 76705 ECHO EXAM OF ABDOMEN: CPT | Performed by: INTERNAL MEDICINE

## 2024-09-04 ENCOUNTER — TELEPHONE (OUTPATIENT)
Facility: CLINIC | Age: 59
End: 2024-09-04

## 2024-09-04 NOTE — TELEPHONE ENCOUNTER
AB US  Narrative   PROCEDURE: US ABDOMEN LIMITED (CPT=76705)     COMPARISON:   None.     INDICATIONS:   Right upper quadrant pain     TECHNIQUE:   Limited evaluation of the areas indicated in the order with gray scale and colorflow of the main vessels where appropriate.  Areas scanned:  Liver gallbladder pancreas right kidney     FINDINGS:  LIVER:   Normal size, echotexture and color flow.  No masses or duct dilatation. Color flow and Doppler imaging of the portal and hepatic veins show patency and antegrade flow.  GALLBLADDER:   Normal.  Normal size.  No calculi, wall thickening or pericholecystic fluid.  Sonographic Quinones's sign was not elicited.  BILE DUCTS:   Normal.  Common bile duct measures 3.5 mm.    PANCREAS:   Visualized portions pancreas unremarkable.  Distal tail obscured  OTHER:   No free fluid hepatorenal fossa. No hydronephrosis right kidney.                                                   Small benign 8 x 5 mm cyst upper pole.     CONCLUSION:  1. Normal hepatic sonogram.  No biliary dilatation.  2. Normal gallbladder ultrasound.  Normal common duct.  3. Visualized portions pancreas unremarkable.  4. Small benign cyst upper pole right kidney.  No hydroureteronephrosis.         Reviewed with patient

## 2024-09-30 DIAGNOSIS — N95.1 MENOPAUSAL SYMPTOMS: ICD-10-CM

## 2024-10-01 RX ORDER — NORETHINDRONE ACETATE AND ETHINYL ESTRADIOL .5; 2.5 MG/1; UG/1
1 TABLET ORAL DAILY
Qty: 90 TABLET | Refills: 3 | OUTPATIENT
Start: 2024-10-01

## 2024-10-02 ENCOUNTER — PATIENT MESSAGE (OUTPATIENT)
Facility: CLINIC | Age: 59
End: 2024-10-02

## 2024-10-02 ENCOUNTER — PATIENT MESSAGE (OUTPATIENT)
Dept: ENDOCRINOLOGY CLINIC | Facility: CLINIC | Age: 59
End: 2024-10-02

## 2024-10-02 DIAGNOSIS — E87.1 HYPONATREMIA: ICD-10-CM

## 2024-10-02 DIAGNOSIS — E03.9 HYPOTHYROIDISM, UNSPECIFIED TYPE: Primary | ICD-10-CM

## 2024-10-03 RX ORDER — VALACYCLOVIR HYDROCHLORIDE 500 MG/1
500 TABLET, FILM COATED ORAL DAILY
Qty: 90 TABLET | Refills: 3 | Status: SHIPPED | OUTPATIENT
Start: 2024-10-03

## 2024-10-03 RX ORDER — CALCIUM CARBONATE/VITAMIN D3 600 MG-10
1 TABLET ORAL DAILY
Qty: 90 TABLET | Refills: 3 | Status: SHIPPED | OUTPATIENT
Start: 2024-10-03

## 2024-10-03 NOTE — TELEPHONE ENCOUNTER
From: Ashley Graves  To: Kaycee Bolton  Sent: 10/2/2024 7:37 PM CDT  Subject: Blood test    Hello Dr. Bolton,    Would you please put in a test for blood work for my thyroid? I have been very lightheaded and dizzy just about every day. I just want to make sure everything is okay.    I also need a refill but sent in the script through [employer].     Thanks!

## 2024-10-03 NOTE — TELEPHONE ENCOUNTER
Refill Per Protocol     Requested Prescriptions   Pending Prescriptions Disp Refills    valACYclovir 500 MG Oral Tab 90 tablet 3     Sig: Take 1 tablet (500 mg total) by mouth daily.       Herpes Agent Protocol Passed - 9/30/2024  6:54 PM        Passed - In person appointment or virtual visit in the past 12 mos or appointment in next 3 mos     Recent Outpatient Visits              5 months ago RUQ pain    [facility] FAVIO Peguero MD    Office Visit    6 months ago Menopausal symptoms    [facility] - OB/GYN Maria Elena Joseph MD    Office Visit    7 months ago Senile osteoporosis    [facility] - Infusion    Office Visit    7 months ago Senile osteoporosis    [facility] - Infusion    Nurse Only    8 months ago Other osteoporosis without current pathological fracture    [facility] Kaycee Bolton MD    Telemedicine          Future Appointments         Provider Department Appt Notes    In 2 months RamiroMercedes DO [facility] I have been having dizziness in my head all the time and a tight chest with pain on occasion.                           Future Appointments         Provider Department Appt Notes    In 2 months Mercedes Rubio DO [facility] I have been having dizziness in my head all the time and a tight chest with pain on occasion.          Recent Outpatient Visits              5 months ago RUQ pain    [facility], FAVIO Peguero MD    Office Visit    6 months ago Menopausal symptoms    [facility] - OB/GYN Maria Elena Joseph MD    Office Visit    7 months ago Senile osteoporosis    [facility] - Infusion    Office Visit    7 months ago  Senile osteoporosis    [facility] - Infusion    Nurse Only    8 months ago Other osteoporosis without current pathological fracture    [facility], [facility], [place] Kaycee Bolton MD    Telemedicine

## 2024-10-03 NOTE — TELEPHONE ENCOUNTER
Protocol Failed/ No Protocol    Requested Prescriptions   Pending Prescriptions Disp Refills    Calcium Carb-Cholecalciferol (CALCIUM 600+D HIGH POTENCY OR)  0     Sig: Take 2 tablets by mouth daily.       There is no refill protocol information for this order          Future Appointments         Provider Department Appt Notes    In 3 months Mercedes Rubio DO [facility], [facility] I have been having dizziness in my head all the time and a tight chest with pain on occasion.          Recent Outpatient Visits              5 months ago RUQ pain    [facility] [place] FAVIO Kerr MD    Office Visit    6 months ago Menopausal symptoms    [facility] - OB/GYN Maria Elena Joseph MD    Office Visit    7 months ago Senile osteoporosis    [facility]    Office Visit    7 months ago Senile osteoporosis    [facility] - [facility]fusion    Nurse Only    8 months ago Other osteoporosis without current pathological fracture    [facility], Kaycee Baumann MD    Telemedicine

## 2024-10-04 ENCOUNTER — NURSE TRIAGE (OUTPATIENT)
Facility: CLINIC | Age: 59
End: 2024-10-04

## 2024-10-04 NOTE — TELEPHONE ENCOUNTER
Jaycee Hale RN 10/4/2024 8:03 AM CDT        ----- Message -----  From: Ashley Graves  Sent: 10/2/2024 8:01 PM CDT  To: [facility] Rn Triage  Subject: Next Appt     Hello,    I can't get an appointment until the end of December (INSANE)! I would like a blood test panel if that is possible before our appointment. I am lightheaded, dizzy all the time (though mildly). Can you please do a full panel because it has been a while since I have had one done. I have had some occasional chest tightness and a small bit of pain on occasion.  Thank you.

## 2024-10-04 NOTE — TELEPHONE ENCOUNTER
Please reply to pool: EM RN TRIAGE  Action Requested: Summary for Provider     []  Critical Lab, Recommendations Needed  [x] Need Additional Advice  []   FYI    []   Need Orders  [] Need Medications Sent to Pharmacy  []  Other     SUMMARY: Patient contacted regarding her message reporting dizziness and chest pain.  Reports that she is currently in a hospital, mental health day program and is unsure if symptoms may be related to anxiety.  States she has constant, mild dizziness at all times.  Worse when she closes her eyes.  Chest pain occurs intermittently at rest.  Does not notice it when she is up and about.  Center of chest.  She has some shortness of breath when this occurs.  Has baseline neck stiffness, denies jaw or arm pain, nausea or vomiting.  Symptoms present for several months.  Her speech is calm and clear.  Emergency room flags discussed.  Dr. Rubio please advise if you would like to see patient sooner than her December visit.  Noted that visit is virtual, would probably be best to see her in person.  Please advise if SDA may be used.     Reason for call: Chest Pain  Onset: Data Unavailable                       Reason for Disposition   Chest pain(s) lasting a few seconds    Protocols used: Chest Pain-A-[place]

## 2024-10-07 ENCOUNTER — EKG ENCOUNTER (OUTPATIENT)
Dept: LAB | Age: 59
End: 2024-10-07
Attending: FAMILY MEDICINE
Payer: COMMERCIAL

## 2024-10-07 ENCOUNTER — OFFICE VISIT (OUTPATIENT)
Facility: CLINIC | Age: 59
End: 2024-10-07
Payer: COMMERCIAL

## 2024-10-07 ENCOUNTER — LAB ENCOUNTER (OUTPATIENT)
Dept: LAB | Facility: REFERENCE LAB | Age: 59
End: 2024-10-07
Attending: FAMILY MEDICINE
Payer: COMMERCIAL

## 2024-10-07 VITALS
DIASTOLIC BLOOD PRESSURE: 82 MMHG | BODY MASS INDEX: 19.31 KG/M2 | WEIGHT: 109 LBS | OXYGEN SATURATION: 98 % | SYSTOLIC BLOOD PRESSURE: 130 MMHG | HEIGHT: 63 IN | HEART RATE: 72 BPM

## 2024-10-07 DIAGNOSIS — R94.31 ABNORMAL EKG: ICD-10-CM

## 2024-10-07 DIAGNOSIS — F31.9 BIPOLAR 1 DISORDER (HCC): ICD-10-CM

## 2024-10-07 DIAGNOSIS — R07.9 CHEST PAIN, UNSPECIFIED TYPE: ICD-10-CM

## 2024-10-07 DIAGNOSIS — R42 DIZZINESS: Primary | ICD-10-CM

## 2024-10-07 DIAGNOSIS — R42 DIZZINESS: ICD-10-CM

## 2024-10-07 LAB
ANION GAP SERPL CALC-SCNC: 8 MMOL/L (ref 0–18)
ATRIAL RATE: 80 BPM
BASOPHILS # BLD AUTO: 0.03 X10(3) UL (ref 0–0.2)
BASOPHILS NFR BLD AUTO: 0.6 %
BUN BLD-MCNC: 12 MG/DL (ref 9–23)
BUN/CREAT SERPL: 12.6 (ref 10–20)
CALCIUM BLD-MCNC: 9.6 MG/DL (ref 8.7–10.4)
CHLORIDE SERPL-SCNC: 104 MMOL/L (ref 98–112)
CO2 SERPL-SCNC: 26 MMOL/L (ref 21–32)
CREAT BLD-MCNC: 0.95 MG/DL
DEPRECATED RDW RBC AUTO: 40.5 FL (ref 35.1–46.3)
EGFRCR SERPLBLD CKD-EPI 2021: 69 ML/MIN/1.73M2 (ref 60–?)
EOSINOPHIL # BLD AUTO: 0.05 X10(3) UL (ref 0–0.7)
EOSINOPHIL NFR BLD AUTO: 1 %
ERYTHROCYTE [DISTWIDTH] IN BLOOD BY AUTOMATED COUNT: 12.8 % (ref 11–15)
FASTING STATUS PATIENT QL REPORTED: NO
GLUCOSE BLD-MCNC: 105 MG/DL (ref 70–99)
HCT VFR BLD AUTO: 38.9 %
HGB BLD-MCNC: 13.8 G/DL
IMM GRANULOCYTES # BLD AUTO: 0.01 X10(3) UL (ref 0–1)
IMM GRANULOCYTES NFR BLD: 0.2 %
LYMPHOCYTES # BLD AUTO: 1.24 X10(3) UL (ref 1–4)
LYMPHOCYTES NFR BLD AUTO: 24 %
MCH RBC QN AUTO: 30.5 PG (ref 26–34)
MCHC RBC AUTO-ENTMCNC: 35.5 G/DL (ref 31–37)
MCV RBC AUTO: 86.1 FL
MONOCYTES # BLD AUTO: 0.43 X10(3) UL (ref 0.1–1)
MONOCYTES NFR BLD AUTO: 8.3 %
NEUTROPHILS # BLD AUTO: 3.4 X10 (3) UL (ref 1.5–7.7)
NEUTROPHILS # BLD AUTO: 3.4 X10(3) UL (ref 1.5–7.7)
NEUTROPHILS NFR BLD AUTO: 65.9 %
OSMOLALITY SERPL CALC.SUM OF ELEC: 286 MOSM/KG (ref 275–295)
P AXIS: -18 DEGREES
P-R INTERVAL: 152 MS
PLATELET # BLD AUTO: 226 10(3)UL (ref 150–450)
POTASSIUM SERPL-SCNC: 4.1 MMOL/L (ref 3.5–5.1)
Q-T INTERVAL: 394 MS
QRS DURATION: 80 MS
QTC CALCULATION (BEZET): 454 MS
R AXIS: 9 DEGREES
RBC # BLD AUTO: 4.52 X10(6)UL
SODIUM SERPL-SCNC: 138 MMOL/L (ref 136–145)
T AXIS: -2 DEGREES
TROPONIN I SERPL HS-MCNC: 3 NG/L
TSI SER-ACNC: 0.61 MIU/ML (ref 0.55–4.78)
VENTRICULAR RATE: 80 BPM
VIT B12 SERPL-MCNC: >2000 PG/ML (ref 211–911)
WBC # BLD AUTO: 5.2 X10(3) UL (ref 4–11)

## 2024-10-07 PROCEDURE — 82607 VITAMIN B-12: CPT | Performed by: FAMILY MEDICINE

## 2024-10-07 PROCEDURE — 3079F DIAST BP 80-89 MM HG: CPT | Performed by: FAMILY MEDICINE

## 2024-10-07 PROCEDURE — 3075F SYST BP GE 130 - 139MM HG: CPT | Performed by: FAMILY MEDICINE

## 2024-10-07 PROCEDURE — 84443 ASSAY THYROID STIM HORMONE: CPT | Performed by: FAMILY MEDICINE

## 2024-10-07 PROCEDURE — 80048 BASIC METABOLIC PNL TOTAL CA: CPT | Performed by: FAMILY MEDICINE

## 2024-10-07 PROCEDURE — 3008F BODY MASS INDEX DOCD: CPT | Performed by: FAMILY MEDICINE

## 2024-10-07 PROCEDURE — 85025 COMPLETE CBC W/AUTO DIFF WBC: CPT | Performed by: FAMILY MEDICINE

## 2024-10-07 PROCEDURE — 99214 OFFICE O/P EST MOD 30 MIN: CPT | Performed by: FAMILY MEDICINE

## 2024-10-07 PROCEDURE — 84484 ASSAY OF TROPONIN QUANT: CPT | Performed by: FAMILY MEDICINE

## 2024-10-07 NOTE — PROGRESS NOTES
CC:    Chief Complaint   Patient presents with    Dizziness     Dizziness in her head all day for a few months    Chest Pain     Occasionally.       HPI: 59 year old female here with intermittent dizziness and chest pain.  Currently in a PHP at [facility] from 9-3, and it is going very well so far.   Reports her psychiatrist left in July and she was not able to get an appointment until October, which led to more swings in mood and worsening of her bipolar disorder.   Has continued with Oxcarbazepine and Duloxetine, but the psychiatrist through the PHP has not called in the new Abilify yet.   The dose of her Oxcarbazepine was lowered, which has helped with fatigue.   Has been having dizziness intermittently for a few months, and she feels it more when she closes her eyes.   Feels slightly dizzy at all times, and feels like mild vertigo or lightheadedness.  Drinks a lot of water, and her eating has been so much better.   She has not noticed anything makes it better or worse.   Has also developed chest pain randomly, and does not feel it is stress induced.   Feels a sharp pain in the center of her chest that lasts for seconds, and then goes away on its own.  Denies any chest pain with exertion, and denies any associated symptoms.   The chest pain is not daily, but it does occur a few times a week.     ROS:  General:  No fever, no fatigue, weight gain   HEENT:  Denies congestion or nasal discharge, no vision changes   Cardio: Intermittent chest pain, no palpitations   Pulmonary:  No cough, no SOB  GI:  No N/V/D  Dermatologic:  No rashes  Neuro: intermittent dizziness, no headaches     Past Medical History:    Allergic rhinitis    Anemia    Anxiety    Asthma (HCC)    Bipolar depression (HCC)    Bulimia    COPD (chronic obstructive pulmonary disease) (HCC)    Depression    Disorder of thyroid    hypothyroidism    Esophageal reflux    Hyperthyroidism       Social History     Socioeconomic History    Marital status:       Spouse name: Not on file    Number of children: Not on file    Years of education: Not on file    Highest education level: Not on file   Occupational History    Not on file   Tobacco Use    Smoking status: Former     Current packs/day: 0.00     Types: Cigarettes     Quit date:      Years since quittin.7    Smokeless tobacco: Never    Tobacco comments:     sneaky smoker for 10 years I quit 15 years ago   Vaping Use    Vaping status: Some Days    Substances: THC   Substance and Sexual Activity    Alcohol use: Not Currently     Comment: Sober since     Drug use: Yes     Types: Cannabis, benzodiazepines, Amphetamines     Comment: occassionally takes a gummy and smokes marijuanna    Sexual activity: Yes     Partners: Male   Other Topics Concern    Caffeine Concern No    Exercise No    Seat Belt Yes    Special Diet No    Stress Concern No    Weight Concern No   Social History Narrative    Not on file     Social Determinants of Health     Financial Resource Strain: Not on file   Food Insecurity: Not on file   Transportation Needs: Not on file   Physical Activity: Not on file   Stress: Not on file   Social Connections: Not on file   Housing Stability: Not on file       Current Outpatient Medications   Medication Sig Dispense Refill    valACYclovir 500 MG Oral Tab Take 1 tablet (500 mg total) by mouth daily. 90 tablet 3    Calcium Carb-Cholecalciferol (CALCIUM 600+D HIGH POTENCY) 600-10 MG-MCG Oral Tab Take 1 tablet by mouth daily. 90 tablet 3    pantoprazole 40 MG Oral Tab EC Take 1 tablet (40 mg total) by mouth 2 (two) times daily before meals. 180 tablet 3    traZODone 100 MG Oral Tab       Norethindrone-Eth Estradiol 0.5-2.5 MG-MCG Oral Tab Take 1 tablet by mouth daily. 90 tablet 3    triamcinolone 0.1 % External Lotion as needed.      DULoxetine 30 MG Oral Cap DR Particles Take 1 capsule (30 mg total) by mouth 2 (two) times daily.      OXcarbazepine 300 MG Oral Tab Take 1 tablet (300 mg total)  by mouth 2 (two) times daily.      ALPRAZolam 0.5 MG Oral Tab Take 1 tablet (0.5 mg total) by mouth nightly as needed for Sleep.      amphetamine-dextroamphetamine 10 MG Oral Tab Take 1 tablet (10 mg total) by mouth daily.         Seasonal      Vitals:   Vitals:    10/07/24 1401   BP: 130/82   Pulse: 72   SpO2: 98%   Weight: 109 lb (49.4 kg)   Height: 5' 3\" (1.6 m)       Body mass index is 19.31 kg/m².    Physical:  General:  Alert, appropriate, no acute distress   HEENT: supple, no tonsillar erythema or exudate, no lymphadenopathy, no carotid bruits of JVP  Cardio:  RRR, no murmurs, S1, S2  Pulmonary:  Clear bilaterally, good air entry  Dermatologic:  No rashes or lesions  Neuro: CN II-XII intact, 5/5 muscle strength bilateral upper and lower extremities, normal cerebellar testing, no focal neurologic deficits       Assessment and Plan: 59-year-old female here for evaluation of chronic dizziness and chest pain.    1. Dizziness    -Completely normal neurologic exam today, but given persistence of dizziness will check labs for further evaluation  -Consider referral to neurologist or MRI pending results if symptoms persist   - TSH W Reflex To Free T4 [E]; Future  - CBC W Differential W Platelet [E]; Future  - Vitamin B12 [E]; Future  - Basic Metabolic Panel (8) [E]; Future    2. Chest pain, unspecified type    - Check EKG and troponin for further evaluation, and consider cardiology referral if negative and symptoms persist   - Possibly stress induced, and will continue with PHP and management of stress  - EKG 12 Lead to be performed at [facility]; Future  - Troponin I (High Sensitivity) [E]; Future    3. Bipolar 1 disorder (HCC)    - Much better controlled with PHP and slight medication adjustments, and waiting for follow-up with psychiatrist       Mercedes Rubio DO  10/07/24  2:13 PM

## 2024-10-09 ENCOUNTER — TELEPHONE (OUTPATIENT)
Dept: ENDOCRINOLOGY CLINIC | Facility: CLINIC | Age: 59
End: 2024-10-09

## 2024-10-09 DIAGNOSIS — E55.9 VITAMIN D DEFICIENCY: Primary | ICD-10-CM

## 2024-10-16 ENCOUNTER — PATIENT MESSAGE (OUTPATIENT)
Dept: OBGYN CLINIC | Facility: CLINIC | Age: 59
End: 2024-10-16

## 2024-10-20 ENCOUNTER — PATIENT MESSAGE (OUTPATIENT)
Dept: ENDOCRINOLOGY CLINIC | Facility: CLINIC | Age: 59
End: 2024-10-20

## 2024-10-21 NOTE — TELEPHONE ENCOUNTER
MC sent asking pt to clarify if she is taking Unithroid or Synthroid.     Per TE 4/30:  Hi!  They are within normal limits. Continue same dose of Unithroid. Thank you!     Per rx request 4/24:   levothyroxine (UNITHROID) 50 MCG Oral Tab [Kaycee Bolton]       Patient Comment: I am NOT TAKING SYNTHROID AS MY PHARMACY DOES NOT COVER IT>  PLEASE FILL THIS

## 2024-10-22 NOTE — TELEPHONE ENCOUNTER
Dr. Bolton,     Pt reports taking Synthroid 50 mcg, not Unithroid. Script pended.     Endocrine refill protocol for medications for hypothyroidism and hyperthyroidism    Protocol Criteria:  PASSED Reason: N/A    If all below requirements are met, send a 90-day supply with 1 refill per provider protocol.    Verify appointment with Endocrinology completed in the last 12 months or scheduled in the next 6 months.    Normal TSH result in the past 12 months   Review recent telephone encounters and mychart communications with patient to ensure a dose change has not occurred since last office visit that was not updated in the medication history list     Last completed office visit:1/10/2024 Kaycee Bolton MD   Next scheduled Follow up:   Future Appointments   Date Time Provider Department Center   11/4/2024 10:00 AM EM CARD RM3 ECHO EM NI CARD [facility]   11/18/2024  1:30 PM Kaycee Bolton MD ECHNDENDO EC [place]   12/31/2024 12:30 PM Mercedes Rubio DO EMMG 14 FP EMMG 10 OP      Last TSH result:   TSH   Date Value Ref Range Status   10/07/2024 0.613 0.550 - 4.780 mIU/mL Final

## 2024-10-23 RX ORDER — LEVOTHYROXINE SODIUM 50 MCG
50 TABLET ORAL
Qty: 90 TABLET | Refills: 1 | Status: SHIPPED | OUTPATIENT
Start: 2024-10-23

## 2024-11-04 ENCOUNTER — HOSPITAL ENCOUNTER (OUTPATIENT)
Dept: CV DIAGNOSTICS | Facility: HOSPITAL | Age: 59
Discharge: HOME | End: 2024-11-04
Attending: FAMILY MEDICINE
Payer: COMMERCIAL

## 2024-11-04 DIAGNOSIS — R94.31 ABNORMAL EKG: ICD-10-CM

## 2024-11-04 PROCEDURE — 93306 TTE W/DOPPLER COMPLETE: CPT | Performed by: FAMILY MEDICINE

## 2024-11-11 ENCOUNTER — LAB ENCOUNTER (OUTPATIENT)
Dept: LAB | Age: 59
End: 2024-11-11
Attending: FAMILY MEDICINE
Payer: COMMERCIAL

## 2024-11-11 DIAGNOSIS — E87.1 HYPONATREMIA: ICD-10-CM

## 2024-11-11 DIAGNOSIS — E03.9 HYPOTHYROIDISM, UNSPECIFIED TYPE: ICD-10-CM

## 2024-11-11 DIAGNOSIS — R79.89 HIGH SERUM VITAMIN B12: ICD-10-CM

## 2024-11-11 DIAGNOSIS — E55.9 VITAMIN D DEFICIENCY: ICD-10-CM

## 2024-11-11 LAB
ALBUMIN SERPL-MCNC: 4.3 G/DL (ref 3.2–4.8)
ALBUMIN/GLOB SERPL: 1.6 {RATIO} (ref 1–2)
ALP LIVER SERPL-CCNC: 42 U/L
ALT SERPL-CCNC: 19 U/L
ANION GAP SERPL CALC-SCNC: 6 MMOL/L (ref 0–18)
AST SERPL-CCNC: 17 U/L (ref ?–34)
BILIRUB SERPL-MCNC: 0.3 MG/DL (ref 0.3–1.2)
BUN BLD-MCNC: 18 MG/DL (ref 9–23)
BUN/CREAT SERPL: 20.2 (ref 10–20)
CALCIUM BLD-MCNC: 10 MG/DL (ref 8.7–10.4)
CHLORIDE SERPL-SCNC: 105 MMOL/L (ref 98–112)
CO2 SERPL-SCNC: 29 MMOL/L (ref 21–32)
CREAT BLD-MCNC: 0.89 MG/DL
EGFRCR SERPLBLD CKD-EPI 2021: 75 ML/MIN/1.73M2 (ref 60–?)
FASTING STATUS PATIENT QL REPORTED: NO
GLOBULIN PLAS-MCNC: 2.7 G/DL (ref 2–3.5)
GLUCOSE BLD-MCNC: 113 MG/DL (ref 70–99)
OSMOLALITY SERPL CALC.SUM OF ELEC: 293 MOSM/KG (ref 275–295)
POTASSIUM SERPL-SCNC: 4.3 MMOL/L (ref 3.5–5.1)
PROT SERPL-MCNC: 7 G/DL (ref 5.7–8.2)
SODIUM SERPL-SCNC: 140 MMOL/L (ref 136–145)
T4 FREE SERPL-MCNC: 1.2 NG/DL (ref 0.8–1.7)
TSI SER-ACNC: 0.79 UIU/ML (ref 0.55–4.78)
VIT B12 SERPL-MCNC: 1930 PG/ML (ref 211–911)
VIT D+METAB SERPL-MCNC: 62.6 NG/ML (ref 30–100)

## 2024-11-11 PROCEDURE — 82607 VITAMIN B-12: CPT | Performed by: FAMILY MEDICINE

## 2024-11-12 DIAGNOSIS — E53.8 B12 DEFICIENCY: Primary | ICD-10-CM

## 2024-11-18 ENCOUNTER — TELEMEDICINE (OUTPATIENT)
Dept: ENDOCRINOLOGY CLINIC | Facility: CLINIC | Age: 59
End: 2024-11-18
Payer: COMMERCIAL

## 2024-11-18 DIAGNOSIS — E03.9 HYPOTHYROIDISM, UNSPECIFIED TYPE: Primary | ICD-10-CM

## 2024-11-18 DIAGNOSIS — M81.8 OTHER OSTEOPOROSIS WITHOUT CURRENT PATHOLOGICAL FRACTURE: ICD-10-CM

## 2024-11-18 DIAGNOSIS — E87.1 HYPONATREMIA: ICD-10-CM

## 2024-11-18 NOTE — PROGRESS NOTES
Please note that the following visit was completed using two-way, real-time interactive audio and video communication.  This has been done in good keyshawn to provide continuity of care in the best interest of the provider-patient relationship, due to the ongoing public health crisis/national emergency and because of restrictions of visitation.  There are limitations of this visit as no physical exam could be performed.  Every conscious effort was taken to allow for sufficient and adequate time.  This billing was spent on reviewing labs, medications, radiology tests and decision making.  Appropriate medical decision-making and tests are ordered as detailed in the plan of care above.    Follow-up- Reason for Visit:  Hypothyroidism and Hyponatremia  Requesting Physician: Dr. Hagan.    CHIEF COMPLAINT:  Fatigue     INITIAL VISIT:   Ashley Graves is a 59 year old female who presented with hyponatremia, fatigue and polydipsia. She states that she would fill her water bottle many times a day (its about 30oz), drinks coffee in the morning and would have a coke at night.   Five years ago, she was started on Adderal for fatigue.   She was started on Doxepin and the Oxcarbazepine about two to three years ago.    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 We have been successful with limiting water intake to 60 ounces. Previously, I had decided to start her on a small dose of levothyroxine. She is doing very well on this. She may or may not have secondary hypothyroidism . The abnormality in the free T4 may or may not be due to the oxcarbazepine.     A few visits ago, we had gone over her DEXA scan.  BMD measured at AP spine- 1.090 g/cm T-score- -0.8  BMD measured at Femur Left Neck- 0.723 g/cm T-score- -2.3  BMD measured at Femur Right Neck- 0.712 g/cm T-score- -2.3  BMD measured at Femur Total Left- 0.750 g/cm T-score- -2.0  BMD measured at Femur Total Right- 0.738 g/cm T-score- -2.1    FRAX calculated- risk of major  osteoporotic fracture- 7.4 %; risk of hip fracture is 1.3 %    Calcium intake from dietary sources is poor, only from occasional dairy products  She does take a daily Ca and Vitamin D Supplement, and vitamin D level in October 2021 in was 50. However, takes PPI regularly. She has no history of early osteoporosis in family, no personal history of fractures, secondary osteoporosis, steroid use.    She did stop menstruating at the age of 31, and started to have hot flashes after this. She had wanted to have children and went through fertility treatments and used donor eggs for this.     Fatigue- improved, she feels very well today; she states that she has not been as good with drinking less water.    At the last visit, we agreed to obtain a DEXA scan: 2/24/23  PROCEDURE: XR DEXA BONE DENSITOMETRY (CPT=77080)       COMPARISON: None.       INDICATIONS: M85.89 Osteopenia of multiple sites       TECHNIQUE: Measurement of bone mineral density of the lumbar spine and hip was performed on a Hologic dual energy x-ray absorptiometry scanner.       FINDINGS:       LEFT FEMORAL NECK   BMD: 0.569 gm/sq. cm. T SCORE: -2.5 Z SCORE: -1.4       LEFT TOTAL HIP   BMD: 0.701 gm/sq. cm. T SCORE: -2.0 Z SCORE: -1.2       PA LUMBAR SPINE (L1 - L4)   BMD: 1.059 gm/sq. cm. T SCORE: 0.1 Z SCORE: 1.3           T scores are a comparison to sex-matched patients with mean peak bone mass and are given in standard deviation (s.d.).  Each 1 s.d. corresponds to approximately 10% below peak normal bone density.         WORLD HEALTH ORGANIZATION CRITERIA   NORMAL T SCORE: Above -1 s.d.     OSTEOPENIA T SCORE: Between -1 and -2.5 s.d.     OSTEOPOROSIS T SCORE: -2.5 s.d.       National Osteoporosis Foundation Clinician's Guide to Prevention and Treatment of Osteoporosis recommendations for treatment:   Post menopausal women and men age 50 and older presenting with the following should be considered for treatment:   * A hip or vertebral (clinical or  morphometric) fracture   * T score < -2.5 at the femoral neck or spine after appropriate evaluation to exclude secondary causes.   * Low bone mass (T score between -1.0 and -2.5 at the femoral neck or spine) and a 10-year probability of a hip fracture > 3% or a 10-year probability of a major osteoporosis-related fracture > 20% based on the [place]-adapted WHO algorithm                   Impression   CONCLUSION:   1. Findings in the left femoral neck suggest osteoporosis, there is increased fracture risk.  Findings in the total left hip suggest osteopenia, and there may be increased fracture risk.  The 10 year fracture risk for major osteoporotic fracture is 8.8%,    and for hip fracture is 1.6%.   2. Findings in the total AP lumbar spine are in the normal range, and there is no increased fracture risk.                    She had been taking 50 mcg of levothyroxine and then we decreased it to 42mcg when we saw that TSH was suppressed.   She is still having problems with hair loss and is also interested in treating her osteoporosis.       CURRENT MEDICATIONS:    Current Outpatient Medications   Medication Sig Dispense Refill    SYNTHROID 50 MCG Oral Tab Take 1 tablet (50 mcg total) by mouth before breakfast. 90 tablet 1    fluticasone propionate 50 MCG/ACT Nasal Suspension 2 sprays by Each Nare route daily. 18 mL 3    valACYclovir 500 MG Oral Tab Take 1 tablet (500 mg total) by mouth daily. 90 tablet 3    Calcium Carb-Cholecalciferol (CALCIUM 600+D HIGH POTENCY) 600-10 MG-MCG Oral Tab Take 1 tablet by mouth daily. 90 tablet 3    pantoprazole 40 MG Oral Tab EC Take 1 tablet (40 mg total) by mouth 2 (two) times daily before meals. 180 tablet 3    traZODone 100 MG Oral Tab       Norethindrone-Eth Estradiol 0.5-2.5 MG-MCG Oral Tab Take 1 tablet by mouth daily. 90 tablet 3    triamcinolone 0.1 % External Lotion as needed.      DULoxetine 30 MG Oral Cap DR Particles Take 1 capsule (30 mg total) by mouth 2 (two) times daily.       OXcarbazepine 300 MG Oral Tab Take 1 tablet (300 mg total) by mouth 2 (two) times daily.      ALPRAZolam 0.5 MG Oral Tab Take 1 tablet (0.5 mg total) by mouth nightly as needed for Sleep.      amphetamine-dextroamphetamine 10 MG Oral Tab Take 1 tablet (10 mg total) by mouth daily.     Takes Vitamin D3- 2,000 IU    ALLERGIES:  Allergies   Allergen Reactions    Seasonal OTHER (SEE COMMENTS)     Sneezing, watery eyes.          ASSESSMENTS:     REVIEW OF SYSTEMS:  Constitutional: Negative for:  fever, cold/heat intolerance; does have weight gain and fatigue sometimes  Eyes: Negative for:  Visual changes, proptosis, blurring  ENT: Negative for:  dysphagia, neck swelling, dysphonia  Respiratory: Negative for:  dyspnea, cough  Cardiovascular: Negative for:  chest pain, palpitations, orthopnea  GI: Negative for:  abdominal pain, nausea, vomiting, diarrhea, constipation, bleeding  Neurology: Negative for: headache, numbness, weakness  Genito-Urinary: Negative for: dysuria, frequency  Psychiatric: Negative for:  depression, anxiety  Hematology/Lymphatics: Negative for: bruising, lower extremity edema  Endocrine: Negative for: polyuria, does have polydypsia  Skin: Negative for: rash, blister, cellulitis,    PHYSICAL EXAM:   Constitutional: she is not in acute distress, normal appearance, not ill appearing  HENT: Head: atraumatic, no obvious fullness apparent in the throat area  EYE: No scleral icterus; no eye discharge  Pulmonary:  Speaking in full sentences, pulmonary effort is normal  Neurological: Alert and oriented to person, place, and time  Psychiatric: Behavior is normal, normal affect  Skin: No visible lesions  Extremities: no obvious extremity swelling, no lesions    DATA:    Date  sNa  sOSM  uOSM  Nikita  10/01/20 132  273  10/19/20 131  271  10/23/20 128  265  291  62  11/04/20 138  288    01/28/21 137  284     07/21/21 140  290   02/28/22 133  275   05/23/22 137   08/25/22 131   01/10/23 133  01/08/24 133  276  11/11/24 140  293      TFTs:  Date  TSH  FT4  LT4  10/19/20 0.961  0.8  10/28/20 0.771  0.7  12/14/20 0.457  0.7   01/28/21 0.674  1.0  07/21/21 0.524  0.8  10/29/21 0.806  0.8  02/28/22 0.512  0.9  08/25/22 0.236  1.0  09/29/22 0.934  1.1  01/10/23 0.816  1.0   09/25/23 0.157  1.0  50  12/20/23 0.820  1.2  42   11/11/24 0.793  1.2  25     11/11/24:  Vitamin D- 62.6            ASSESSMENT AND PLAN:  This is a 59 year-old woman with a past history of severe depression, alcohol dependence who is here for follow-up of hyponatremia and possible hypothyroidism as well as osteoporosis. She is doing well. She has just finished a PHP program.     1.) Hypothyroidism. TSH is within normal limits, but I think that we can decrease the Synthroid dose even further so that TSH level is above 1.   - Decrease Synthroid from 25mcg to 21mcg   - Check TSH and FT4 in 3 months    2.) Hyponatremia from SIADH due to oxcarbazepine  - patient is currently oxcarbazeine as it is really helping her, but psychiatrist will be adjusting medications further  - patient to continue to restrict water intake to 60 ounces daily  - check CMP in 3 months    3.) Osteoporosis  - Patient had one dose of Reclast as patient has very severe GERD and takes pantoprazole, but had very severe side effects; she would not like to continue on this  - Check DEXA scan in 2/2025      Return to clinic in 3 months    Prior to this encounter, I spent over 15 minutes with preparing for the visit, including reviewing documents from other specialties as well as from PCP and going over test results and imaging studies. During the face to face encounter, I spent an additional 15 minutes which were determined for follow-up. Greater than 50% of  the time was spent in counseling, anticipatory guidance, and coordination of care. Patient concerns were answered to the best of my knowledge.       11/18/24  Kaycee Bolton MD

## 2024-11-26 ENCOUNTER — NURSE TRIAGE (OUTPATIENT)
Facility: CLINIC | Age: 59
End: 2024-11-26

## 2024-11-26 ENCOUNTER — HOSPITAL ENCOUNTER (OUTPATIENT)
Age: 59
Discharge: HOME | End: 2024-11-26
Payer: COMMERCIAL

## 2024-11-26 VITALS
DIASTOLIC BLOOD PRESSURE: 71 MMHG | TEMPERATURE: 98 F | RESPIRATION RATE: 18 BRPM | SYSTOLIC BLOOD PRESSURE: 121 MMHG | OXYGEN SATURATION: 100 % | HEART RATE: 76 BPM

## 2024-11-26 DIAGNOSIS — L03.019 INFECTION OF FINGERNAIL: Primary | ICD-10-CM

## 2024-11-26 PROCEDURE — 99204 OFFICE O/P NEW MOD 45 MIN: CPT | Performed by: NURSE PRACTITIONER

## 2024-11-26 RX ORDER — NAPROXEN 500 MG/1
500 TABLET ORAL 2 TIMES DAILY PRN
Qty: 20 TABLET | Refills: 0 | Status: SHIPPED | OUTPATIENT
Start: 2024-11-26 | End: 2024-12-06

## 2024-11-26 RX ORDER — CEPHALEXIN 500 MG/1
500 CAPSULE ORAL 3 TIMES DAILY
Qty: 21 CAPSULE | Refills: 0 | Status: SHIPPED | OUTPATIENT
Start: 2024-11-26 | End: 2024-12-03

## 2024-11-26 NOTE — TELEPHONE ENCOUNTER
Action Requested: Summary for Provider     []  Critical Lab, Recommendations Needed  [] Need Additional Advice  []   FYI    []   Need Orders  [] Need Medications Sent to Pharmacy  []  Other     SUMMARY: Patient c/o of pain under the nail since peeling cutie,  to left thumb with swelling and throbs in pain.  Stated it happen couple of weeks ago but now having issues.    Denies: fever, chills    Future Appointments   Date Time Provider Department Center   11/26/2024  5:20 PM [facility] [place]   12/31/2024 12:30 PM Mercedes Rubio DO [facility] 14 FP [facility] 10 OP   3/28/2025 10:40 AM [facility] DEXA RM1 [facility] DEXA EM [facility]   4/9/2025 11:15 AM Kaycee Bolton MD [facility]       Reason for Disposition   Redness and painful skin around fingernail (cuticle, nailfold)    Protocols used: Finger Pain-A-[place]

## 2024-11-26 NOTE — ED INITIAL ASSESSMENT (HPI)
Pt here with left thumb pain, pt states she was peeling a Cutie 2 weeks ago and got something caught under her nail,pt states left thumb is very tender and swollen, pt denies any drainage from thumb or fevers

## 2024-11-26 NOTE — TELEPHONE ENCOUNTER
Patient called back and canceled appt for today. Patient will go to the urgent care in [place] today. Patient lives in [place] and does not want to travel to [place].   PROVIDER:[TOKEN:[[id number]]]

## 2024-11-26 NOTE — ED PROVIDER NOTES
Patient Seen in: [facility]      History     Chief Complaint   Patient presents with    FB in Skin     Stated Complaint: LEFT THUMB    Subjective:   HPI  Patient is a 59-year-old female who presents to the immediate care center with a concern for pain and swelling to the distal left thumb.  She was peeling an orange approximately 2 weeks ago and believes the process there was a fragment of peeling that became wedged underneath her nail.  At that time, she has had continued continued pain which is worsening over the last several days and swelling since yesterday.            Objective:     Past Medical History:    Allergic rhinitis    Anemia    Anxiety    Asthma ([facility])    Bipolar depression (HCC)    Bulimia    COPD (chronic obstructive pulmonary disease) ([facility])    Depression    Disorder of thyroid    hypothyroidism    Esophageal reflux    Hyperthyroidism              Past Surgical History:   Procedure Laterality Date          Colonoscopy N/A 08/10/2021    Procedure: COLONOSCOPY;  Surgeon: Harinder Negron MD;  Location: [facility] ENDOSCOPY    Colonoscopy      Upper gi endoscopy,exam  2023    Dr Cody - [facility]                Social History     Socioeconomic History    Marital status:    Tobacco Use    Smoking status: Former     Current packs/day: 0.00     Types: Cigarettes     Quit date:      Years since quittin.9    Smokeless tobacco: Never    Tobacco comments:     sneaky smoker for 10 years I quit 15 years ago   Vaping Use    Vaping status: Former    Substances: THC   Substance and Sexual Activity    Alcohol use: Not Currently     Comment: Sober since     Drug use: Not Currently     Types: Cannabis, benzodiazepines, Amphetamines     Comment: occassionally takes a gummy and smokes marijuanna    Sexual activity: Yes     Partners: Male   Other Topics Concern    Caffeine Concern No    Exercise No    Seat Belt Yes    Special Diet No    Stress Concern No    Weight Concern No               Review of Systems   Constitutional:  Negative for appetite change, chills and fever.   Musculoskeletal:  Positive for arthralgias.   Neurological:  Negative for weakness and numbness.       Positive for stated complaint: LEFT THUMB  Other systems are as noted in HPI.  Constitutional and vital signs reviewed.      All other systems reviewed and negative except as noted above.    Physical Exam     ED Triage Vitals [11/26/24 1539]   /71   Pulse 76   Resp 18   Temp 98.1 °F (36.7 °C)   Temp src Oral   SpO2 100 %   O2 Device None (Room air)       Current Vitals:   Vital Signs  BP: 121/71  Pulse: 76  Resp: 18  Temp: 98.1 °F (36.7 °C)  Temp src: Oral    Oxygen Therapy  SpO2: 100 %  O2 Device: None (Room air)        Physical Exam  Vitals and nursing note reviewed.   Constitutional:       General: She is not in acute distress.     Appearance: She is not ill-appearing.   Pulmonary:      Effort: Pulmonary effort is normal. No respiratory distress.   Musculoskeletal:      Left hand: Swelling and tenderness present. Normal sensation. Normal capillary refill.        Hands:    Skin:     General: Skin is warm and dry.   Neurological:      Mental Status: She is alert and oriented to person, place, and time.   Psychiatric:         Behavior: Behavior normal.             ED Course   Labs Reviewed - No data to display  Digital block was achieved with lidocaine without epinephrine.  With good anesthesia, the area of purulence was probed with 18-gauge needle.  There was a large amount of purulence relieved with.  Afterward, attention was focused on the 3 mm x 3 mm area of discoloration under the nail.  This was explored with splinter forceps.  There is small piece of matter was extracted (through the blood and purulence, this is not readily identifiable).  Patient tolerated procedure well.  Bandage applied by nurse practitioner.              MDM      Patient was informed that is important she complete antibiotics as  prescribed.  She was given a prescription for naproxen for pain relief.  She will follow with her primary care provider in the next week if symptoms continue or seek prompt reevaluation if it anytime symptoms worsen.  She states understanding and agrees with plan.            Medical Decision Making  Differential diagnoses considered today include, but are not exclusive of: Paronychia, felon, subungual infection, foreign body between the nail plate and nail matrix.    Problems Addressed:  Infection of fingernail: undiagnosed new problem with uncertain prognosis    Risk  OTC drugs.  Prescription drug management.        Disposition and Plan     Clinical Impression:  1. Infection of fingernail         Disposition:  Discharge  11/26/2024  4:42 pm    Follow-up:  Mercedes Rubio DO  [address] [address]  [phone]      As needed          Medications Prescribed:  Current Discharge Medication List        START taking these medications    Details   cephALEXin 500 MG Oral Cap Take 1 capsule (500 mg total) by mouth 3 (three) times daily for 7 days.  Qty: 21 capsule, Refills: 0      naproxen 500 MG Oral Tab Take 1 tablet (500 mg total) by mouth 2 (two) times daily as needed.  Qty: 20 tablet, Refills: 0                 Supplementary Documentation:

## 2024-11-27 ENCOUNTER — TELEPHONE (OUTPATIENT)
Facility: CLINIC | Age: 59
End: 2024-11-27

## 2024-11-27 NOTE — TELEPHONE ENCOUNTER
Patient (name and  verified) calling clarify her urgent care discharge instructions for nail infection. Patient was seen in the IC on . Reiterated instructions and medications prescribed. All questions were answered, verbalized understanding. Patient instructed to call back for worsening symptoms.

## 2025-01-16 ENCOUNTER — TELEPHONE (OUTPATIENT)
Facility: CLINIC | Age: 60
End: 2025-01-16

## 2025-01-16 RX ORDER — PANTOPRAZOLE SODIUM 40 MG/1
40 TABLET, DELAYED RELEASE ORAL
Qty: 180 TABLET | Refills: 3 | Status: SHIPPED | OUTPATIENT
Start: 2025-01-16 | End: 2026-01-11

## 2025-01-16 NOTE — TELEPHONE ENCOUNTER
Current Outpatient Medications   Medication Sig Dispense Refill    pantoprazole 40 MG Oral Tab EC Take 1 tablet (40 mg total) by mouth 2 (two) times daily before meals. 180 tablet 3

## 2025-02-17 ENCOUNTER — PATIENT MESSAGE (OUTPATIENT)
Facility: CLINIC | Age: 60
End: 2025-02-17

## 2025-02-18 RX ORDER — FLUTICASONE PROPIONATE 50 MCG
2 SPRAY, SUSPENSION (ML) NASAL DAILY
Qty: 3 EACH | Refills: 3 | Status: SHIPPED | OUTPATIENT
Start: 2025-02-18

## 2025-02-18 NOTE — TELEPHONE ENCOUNTER
please advise - the patient is asking for a refill of her Fluticasone but she is asking to have three dispensed instead of only one because she runs out and she pays the same price for one as she would pay for three. Pended the script for only one, not sure if you would want to approve 3 at a time.     Routed to pod mate - awais out of office.

## 2025-02-19 NOTE — TELEPHONE ENCOUNTER
Ximalayat message sent.       Pharmacy  [id number] - [place], [place] - [address] [phone], [phone]      Disp Refills Start End    fluticasone propionate 50 MCG/ACT Nasal Suspension 3 each 3 2/18/2025 --    Sig - Route: 2 sprays by Each Nare route daily. - Each Nare    Sent to pharmacy as: Fluticasone Propionate 50 MCG/ACT Nasal Suspension (Flonase)    E-Prescribing Status: Receipt confirmed by pharmacy (2/18/2025  1:22 PM CST)

## 2025-03-31 RX ORDER — PANTOPRAZOLE SODIUM 40 MG/1
40 TABLET, DELAYED RELEASE ORAL
Qty: 180 TABLET | Refills: 3 | Status: SHIPPED | OUTPATIENT
Start: 2025-03-31 | End: 2026-03-26

## 2025-03-31 NOTE — TELEPHONE ENCOUNTER
Requested Prescriptions     Pending Prescriptions Disp Refills    pantoprazole 40 MG Oral Tab  tablet 3     Sig: Take 1 tablet (40 mg total) by mouth 2 (two) times daily before meals.        : [facility] - [place] - [address] AT PORTAL TO [facility] SITES, [phone], [phone]     Patient wants medication refill at above pharmacy mail order    LOV:  10/07/24      LAST REFILL :  1/06/25

## 2025-04-02 RX ORDER — FLUTICASONE PROPIONATE 50 MCG
2 SPRAY, SUSPENSION (ML) NASAL DAILY
Qty: 3 EACH | Refills: 3 | Status: SHIPPED | OUTPATIENT
Start: 2025-04-02

## 2025-04-02 NOTE — TELEPHONE ENCOUNTER
Fluticasone nasal Spray: 1 year supply sent to [facility] in [place] on 02/18/2025    Patient requesting mailorder due to price

## 2025-04-02 NOTE — TELEPHONE ENCOUNTER
Refill passed per Clinic protocol.  Requested Prescriptions   Pending Prescriptions Disp Refills    fluticasone propionate 50 MCG/ACT Nasal Suspension 3 each 3     Si sprays by Each Nare route daily.       Allergy Medication Protocol Passed - 2025 10:50 AM        Passed - In person appointment or virtual visit in the past 12 mos or appointment in next 3 mos     Recent Outpatient Visits              4 months ago Hypothyroidism, unspecified type    [facility] Alondra Berry Sudha K, MD    Telemedicine    5 months ago Dizziness    [facility] Mercedes Rubio DO    Office Visit    11 months ago RUQ pain    [facility] FAVIO Kerr MD    Office Visit    1 year ago Menopausal symptoms    [facility] - OB/GYN Maria Elena Joseph MD    Office Visit    1 year ago Senile osteoporosis    [facility] - Infusion    Office Visit          Future Appointments         Provider Department Appt Notes    Tomorrow Maria Elena Joseph MD [facility] - OB/GYN Annual visit.  Check medication dosage/usage.    In 1 week [facility] DEXA [address] DEXA - [facility] r/s from [place]  last dexa scan  23    In 1 month Mercedes Rubio DO [facility] Annual    In 3 months Kaycee Bolton MD [facility] Al[facility]ra Berry follow up on medicine change and test results                    Passed - Medication is active on med list

## 2025-04-11 ENCOUNTER — HOSPITAL ENCOUNTER (OUTPATIENT)
Dept: BONE DENSITY | Facility: HOSPITAL | Age: 60
Discharge: HOME | End: 2025-04-11
Attending: INTERNAL MEDICINE
Payer: COMMERCIAL

## 2025-04-11 DIAGNOSIS — M81.8 OTHER OSTEOPOROSIS WITHOUT CURRENT PATHOLOGICAL FRACTURE: ICD-10-CM

## 2025-04-11 PROCEDURE — 77080 DXA BONE DENSITY AXIAL: CPT | Performed by: INTERNAL MEDICINE

## 2025-04-17 ENCOUNTER — TELEPHONE (OUTPATIENT)
Age: 60
End: 2025-04-17

## 2025-04-17 NOTE — TELEPHONE ENCOUNTER
I am reaching out from the [facility] Navigation department, following up on an order from your provider's office to assist in connecting you with resources for care. If you would like to discuss this further, please give us a call back at [phone], or for more immediate assistance you can contact our 24-hour help line at [phone]. We look forward to hearing from you soon.

## 2025-04-19 DIAGNOSIS — N95.1 MENOPAUSAL SYMPTOMS: ICD-10-CM

## 2025-04-21 RX ORDER — NORETHINDRONE ACETATE AND ETHINYL ESTRADIOL .5; 2.5 MG/1; UG/1
1 TABLET ORAL DAILY
Qty: 90 TABLET | Refills: 0 | Status: SHIPPED | OUTPATIENT
Start: 2025-04-21

## 2025-05-08 ENCOUNTER — TELEPHONE (OUTPATIENT)
Age: 60
End: 2025-05-08

## 2025-05-12 ENCOUNTER — LAB ENCOUNTER (OUTPATIENT)
Dept: LAB | Facility: REFERENCE LAB | Age: 60
End: 2025-05-12
Attending: FAMILY MEDICINE
Payer: COMMERCIAL

## 2025-05-12 ENCOUNTER — OFFICE VISIT (OUTPATIENT)
Facility: CLINIC | Age: 60
End: 2025-05-12
Payer: COMMERCIAL

## 2025-05-12 VITALS
OXYGEN SATURATION: 96 % | DIASTOLIC BLOOD PRESSURE: 74 MMHG | HEIGHT: 63 IN | SYSTOLIC BLOOD PRESSURE: 126 MMHG | HEART RATE: 86 BPM | BODY MASS INDEX: 20.73 KG/M2 | WEIGHT: 117 LBS

## 2025-05-12 DIAGNOSIS — Z01.84 IMMUNITY STATUS TESTING: ICD-10-CM

## 2025-05-12 DIAGNOSIS — R53.83 FATIGUE, UNSPECIFIED TYPE: ICD-10-CM

## 2025-05-12 DIAGNOSIS — Z12.31 VISIT FOR SCREENING MAMMOGRAM: ICD-10-CM

## 2025-05-12 DIAGNOSIS — Z00.00 ENCOUNTER FOR ROUTINE ADULT HEALTH EXAMINATION WITHOUT ABNORMAL FINDINGS: ICD-10-CM

## 2025-05-12 DIAGNOSIS — Z11.1 SCREENING FOR TUBERCULOSIS: ICD-10-CM

## 2025-05-12 DIAGNOSIS — Z00.00 ENCOUNTER FOR ROUTINE ADULT HEALTH EXAMINATION WITHOUT ABNORMAL FINDINGS: Primary | ICD-10-CM

## 2025-05-12 LAB
ALBUMIN SERPL-MCNC: 4.4 G/DL (ref 3.2–4.8)
ALBUMIN/GLOB SERPL: 1.9 {RATIO} (ref 1–2)
ALP LIVER SERPL-CCNC: 42 U/L (ref 46–118)
ALT SERPL-CCNC: 28 U/L (ref 10–49)
ANION GAP SERPL CALC-SCNC: 6 MMOL/L (ref 0–18)
AST SERPL-CCNC: 18 U/L (ref ?–34)
BASOPHILS # BLD AUTO: 0.04 X10(3) UL (ref 0–0.2)
BASOPHILS NFR BLD AUTO: 0.6 %
BILIRUB SERPL-MCNC: 0.2 MG/DL (ref 0.3–1.2)
BUN BLD-MCNC: 23 MG/DL (ref 9–23)
BUN/CREAT SERPL: 24.7 (ref 10–20)
CALCIUM BLD-MCNC: 9.1 MG/DL (ref 8.7–10.4)
CHLORIDE SERPL-SCNC: 105 MMOL/L (ref 98–112)
CHOLEST SERPL-MCNC: 173 MG/DL (ref ?–200)
CO2 SERPL-SCNC: 28 MMOL/L (ref 21–32)
CREAT BLD-MCNC: 0.93 MG/DL (ref 0.55–1.02)
DEPRECATED RDW RBC AUTO: 40.4 FL (ref 35.1–46.3)
EGFRCR SERPLBLD CKD-EPI 2021: 71 ML/MIN/1.73M2 (ref 60–?)
EOSINOPHIL # BLD AUTO: 0.22 X10(3) UL (ref 0–0.7)
EOSINOPHIL NFR BLD AUTO: 3.2 %
ERYTHROCYTE [DISTWIDTH] IN BLOOD BY AUTOMATED COUNT: 12.5 % (ref 11–15)
EST. AVERAGE GLUCOSE BLD GHB EST-MCNC: 108 MG/DL (ref 68–126)
FASTING PATIENT LIPID ANSWER: NO
FASTING STATUS PATIENT QL REPORTED: NO
GLOBULIN PLAS-MCNC: 2.3 G/DL (ref 2–3.5)
GLUCOSE BLD-MCNC: 94 MG/DL (ref 70–99)
HBA1C MFR BLD: 5.4 % (ref ?–5.7)
HCT VFR BLD AUTO: 37.4 % (ref 35–48)
HDLC SERPL-MCNC: 85 MG/DL (ref 40–59)
HETEROPH AB SER QL: NEGATIVE
HGB BLD-MCNC: 12.4 G/DL (ref 12–16)
IMM GRANULOCYTES # BLD AUTO: 0.02 X10(3) UL (ref 0–1)
IMM GRANULOCYTES NFR BLD: 0.3 %
LDLC SERPL CALC-MCNC: 77 MG/DL (ref ?–100)
LYMPHOCYTES # BLD AUTO: 2.1 X10(3) UL (ref 1–4)
LYMPHOCYTES NFR BLD AUTO: 30.1 %
MCH RBC QN AUTO: 29 PG (ref 26–34)
MCHC RBC AUTO-ENTMCNC: 33.2 G/DL (ref 31–37)
MCV RBC AUTO: 87.6 FL (ref 80–100)
MONOCYTES # BLD AUTO: 0.5 X10(3) UL (ref 0.1–1)
MONOCYTES NFR BLD AUTO: 7.2 %
NEUTROPHILS # BLD AUTO: 4.1 X10 (3) UL (ref 1.5–7.7)
NEUTROPHILS # BLD AUTO: 4.1 X10(3) UL (ref 1.5–7.7)
NEUTROPHILS NFR BLD AUTO: 58.6 %
NONHDLC SERPL-MCNC: 88 MG/DL (ref ?–130)
OSMOLALITY SERPL CALC.SUM OF ELEC: 291 MOSM/KG (ref 275–295)
PLATELET # BLD AUTO: 185 10(3)UL (ref 150–450)
POTASSIUM SERPL-SCNC: 3.9 MMOL/L (ref 3.5–5.1)
PROT SERPL-MCNC: 6.7 G/DL (ref 5.7–8.2)
RBC # BLD AUTO: 4.27 X10(6)UL (ref 3.8–5.3)
SODIUM SERPL-SCNC: 139 MMOL/L (ref 136–145)
TRIGL SERPL-MCNC: 53 MG/DL (ref 30–149)
VLDLC SERPL CALC-MCNC: 8 MG/DL (ref 0–30)
WBC # BLD AUTO: 7 X10(3) UL (ref 4–11)

## 2025-05-12 PROCEDURE — 80053 COMPREHEN METABOLIC PANEL: CPT | Performed by: FAMILY MEDICINE

## 2025-05-12 PROCEDURE — 86308 HETEROPHILE ANTIBODY SCREEN: CPT | Performed by: FAMILY MEDICINE

## 2025-05-12 PROCEDURE — 80061 LIPID PANEL: CPT | Performed by: FAMILY MEDICINE

## 2025-05-12 PROCEDURE — 85025 COMPLETE CBC W/AUTO DIFF WBC: CPT | Performed by: FAMILY MEDICINE

## 2025-05-12 PROCEDURE — 83036 HEMOGLOBIN GLYCOSYLATED A1C: CPT | Performed by: FAMILY MEDICINE

## 2025-05-12 PROCEDURE — 86765 RUBEOLA ANTIBODY: CPT | Performed by: FAMILY MEDICINE

## 2025-05-12 NOTE — PROGRESS NOTES
HPI:   Ashley Graves is a 59 year old female who presents for a complete physical exam.     Has had a cold since starting a new job with kids, but for the past two weeks her neck and back have been hurting and she feels very fatigued. Has been using a heating pad and staying home all weekend, but still feeling bad. Also has nasal congestion and a sore throat.     Last pap: 3/2023 and normal   Last mammogram: 11/2023 and normal    Previous colonoscopy: 8/2021 and told to repeat in 5 years   History of STD's: None  History of intimate partner violence: None  Family hx of breast, ovarian, cervical or colon CA: Mom with colon cancer, PGM with breast cancer   Diet and exercise: Has not been exercising regularly. Has been eating a worse diet recently since starting her new job.   Immunizations:  Tdap: 2022, Pneumo: 2024, Shingles: Completed, Covid: Completed 2 doses    REVIEW OF SYSTEMS:   GENERAL: fatigue   SKIN: denies any unusual skin lesions  EYES: no vision problems  BREAST: no lumps or masses, no nipple discharge   LUNGS: denies shortness of breath  CARDIOVASCULAR: denies chest pain  GI: denies abdominal pain,  No constipation or diarrhea, no hematochezia  : denies dysuria, vaginal discharge or itching  NEURO: denies headaches  PSYCHE: denies depression or anxiety  MSK: acute neck pain          Current Medications[1]  Allergies[2]   Past Medical History[3]   Past Surgical History[4]   Family History[5]   Social History:   Short Social Hx on File[6]    Occ: Pre-k teacher. : Yes. Children: Triplet daughters (15 years old)         EXAM:     Wt Readings from Last 6 Encounters:   05/12/25 117 lb (53.1 kg)   10/07/24 109 lb (49.4 kg)   04/11/24 104 lb (47.2 kg)   03/08/24 98 lb (44.5 kg)   02/21/24 105 lb 12.8 oz (48 kg)   01/08/24 100 lb (45.4 kg)     Body mass index is 20.73 kg/m².   /74   Pulse 86   Ht 5' 3\" (1.6 m)   Wt 117 lb (53.1 kg)   SpO2 96%   BMI 20.73 kg/m²     GENERAL: well developed,  well nourished, in no apparent distress   SKIN: no rashes, no suspicious lesions  HEENT: atraumatic, normocephalic, throat clear; normal dentition, bilateral TM's normal   EYES: PERRLA, EOMI, conjunctiva are clear  NECK: supple, no adenopathy or thyroid masses   BREAST: no dominant or suspicious mass, no nipple discharge  LUNGS: clear to auscultation  CARDIO: RRR without murmur  GI: good bowel sounds, no masses, HSM or tenderness  : deferred, not due for pap smear and no concerns   EXTREMITIES: no edema    Cholesterol, Total (mg/dL)   Date Value   01/08/2024 219 (H)   05/05/2023 233 (H)   05/23/2022 201 (H)     HDL Cholesterol (mg/dL)   Date Value   01/08/2024 118 (H)   05/05/2023 120 (H)   05/23/2022 106 (H)     LDL Cholesterol (mg/dL)   Date Value   01/08/2024 92   05/05/2023 105 (H)   05/23/2022 84      ASSESSMENT AND PLAN:   Ashley Graves is a 59 year old female who presents for a complete physical exam.  Encounter Diagnoses   Name Primary?    Encounter for routine adult health examination without abnormal findings Yes    Visit for screening mammogram     Immunity status testing     Screening for tuberculosis     Fatigue, unspecified type      Orders Placed This Encounter   Procedures    CBC With Differential With Platelet    Comp Metabolic Panel (14)    Hemoglobin A1C    Lipid Panel    Rubeola(Measles)Antibodies, IGG-Immunity [E]    PPD [69977] (Dx Z11.1)    Mononucleosis, Qual (MonoSpot) [E]     Check mono with labs due to significant fatigue reported following cold symptoms.  Will check measles titer as needed for working in a , and TB testing completed with [facility] today.  She will return in 48 hours to have her read.    Discussed with patient the following:  -Monthly breast self-exam  -Breast cancer screening/mammograms and clinical breast exams  -Cervical cancer screening/pap smears  -Colon cancer screening/colonoscopy  -Adequate calcium and Vitamin D intake to prevent osteoporosis  -Bone density  screening for osteopenia/osteoporosis  -Healthy diet including adequate intake of vegetables and fruits, appropriate portion sizes, minimizing highly concentrated carbohydrate foods  -Exercising 30 minutes a day most days of the week   -Diabetes screening which she desires  -Cholesterol screening which she desires  -Recommendation for yearly influenza vaccine  -Need for Tdap once as an adult and Td booster every 10 years  -Need for Zoster vaccine for patients >= 50  -STI screening (GC/Chlamydia/HIV): Not indicated  -Hepatitis C screening for all adults between the ages of 18 and 79: Checked and negative     All questions were answered during the visit and the patient verbalizes understanding. She will return in one year for next WWE or sooner as needed.    Meds & Refills for this Visit:  Requested Prescriptions      No prescriptions requested or ordered in this encounter       Imaging & Consults:  TYRA JOHNSON 2D+3D SCREENING BILAT (CPT=77067/80364)    Mercedes Rubio DO  5/12/2025  2:05 PM         [1]   Current Outpatient Medications   Medication Sig Dispense Refill    Norethindrone-Eth Estradiol 0.5-2.5 MG-MCG Oral Tab Take 1 tablet by mouth daily. 90 tablet 0    ALPRAZolam 0.5 MG Oral Tab Take 1 tablet (0.5 mg total) by mouth 3 (three) times daily as needed for Sleep. 270 tablet 0    amphetamine-dextroamphetamine 10 MG Oral Tab Take 1 tablet (10 mg total) by mouth daily. 30 tablet 0    traZODone 100 MG Oral Tab Take 1 tablet (100 mg total) by mouth daily. 90 tablet 0    lamoTRIgine 25 MG Oral Tab Take 1 tablet (25 mg total) by mouth daily. 90 tablet 0    DULoxetine 20 MG Oral Cap DR Particles Take 2 capsules (40 mg total) by mouth daily. 180 capsule 0    fluticasone propionate 50 MCG/ACT Nasal Suspension 2 sprays by Each Nare route daily. 3 each 3    pantoprazole 40 MG Oral Tab EC Take 1 tablet (40 mg total) by mouth 2 (two) times daily before meals. 180 tablet 3    SYNTHROID 50 MCG Oral Tab Take 1 tablet (50 mcg total)  by mouth before breakfast. 90 tablet 1    valACYclovir 500 MG Oral Tab Take 1 tablet (500 mg total) by mouth daily. 90 tablet 3   [2]   Allergies  Allergen Reactions    Seasonal OTHER (SEE COMMENTS)     Sneezing, watery eyes.    [3]   Past Medical History:   Allergic rhinitis    Anemia    Anxiety    Asthma (HCC)    Bipolar depression (HCC)    Bulimia    COPD (chronic obstructive pulmonary disease) (HCC)    Depression    Disorder of thyroid    hypothyroidism    Esophageal reflux    Hyperthyroidism    Osteoporosis   [4]   Past Surgical History:  Procedure Laterality Date          Colonoscopy N/A 08/10/2021    Procedure: COLONOSCOPY;  Surgeon: Harinder Negron MD;  Location: [facility] ENDOSCOPY    Colonoscopy      Upper gi endoscopy,exam  2023    Dr Cody - [facility]   [5]   Family History  Problem Relation Age of Onset    Colon Cancer Mother     Cancer Mother 60        colon cancer    Stroke Mother     Hypertension Father     Breast Cancer Paternal Grandmother         40-50's    Cancer Paternal Grandmother     Ovarian Cancer Neg    [6]   Social History  Socioeconomic History    Marital status:    Tobacco Use    Smoking status: Former     Current packs/day: 0.00     Types: Cigarettes     Quit date:      Years since quittin.3    Smokeless tobacco: Never    Tobacco comments:     sneaky smoker for 10 years I quit 15 years ago   Vaping Use    Vaping status: Former    Substances: THC   Substance and Sexual Activity    Alcohol use: Not Currently     Comment: Sober since     Drug use: Yes     Types: Cannabis, benzodiazepines, Amphetamines     Comment: occassionally takes a gummy and smokes marijuanna    Sexual activity: Yes     Partners: Male   Other Topics Concern    Caffeine Concern No    Exercise No    Seat Belt Yes    Special Diet No    Stress Concern No    Weight Concern No     Social Drivers of Health     Food Insecurity: No Food Insecurity (2025)    NCSS - Food Insecurity     Worried  About Running Out of Food in the Last Year: No     Ran Out of Food in the Last Year: No   Transportation Needs: No Transportation Needs (5/12/2025)    NCSS - Transportation     Lack of Transportation: No   Housing Stability: Not At Risk (5/12/2025)    NCSS - Housing/Utilities     Has Housing: Yes     Worried About Losing Housing: No     Unable to Get Utilities: No

## 2025-05-13 ENCOUNTER — TELEPHONE (OUTPATIENT)
Facility: CLINIC | Age: 60
End: 2025-05-13

## 2025-05-13 NOTE — TELEPHONE ENCOUNTER
Office staff - please let nursing know of time change for appointment tomorrow    Spoke to patient, full name and date of birth verified.  Patient has nurse appointment 5/14/25 at 2:00 PM for her TB read.     Patient's employer is saying that she has to come in at 12:30 PM or 4:30 PM, can't let her off for 2:00 time.     RN rescheduled the appointment for 1:00 PM

## 2025-05-14 ENCOUNTER — NURSE ONLY (OUTPATIENT)
Facility: CLINIC | Age: 60
End: 2025-05-14
Payer: COMMERCIAL

## 2025-05-14 LAB
INDURATION (): 0 MM (ref 0–11)
MEV IGG SER-ACNC: 279 AU/ML (ref 16.5–?)

## 2025-05-19 RX ORDER — LEVOTHYROXINE SODIUM 50 MCG
50 TABLET ORAL
Qty: 90 TABLET | Refills: 1 | Status: SHIPPED | OUTPATIENT
Start: 2025-05-19

## 2025-05-19 NOTE — TELEPHONE ENCOUNTER
Endocrine refill protocol for medications for hypothyroidism and hyperthyroidism    Protocol Criteria:  PASSED     If all below requirements are met, send a 90-day supply with 1 refill per provider protocol.    Verify appointment with Endocrinology completed in the last 12 months or scheduled in the next 6 months.    Normal TSH result in the past 12 months   Review recent telephone encounters and mychart communications with patient to ensure a dose change has not occurred since last office visit that was not updated in the medication history list    Last completed telemed visit: 11/18/2024 Kaycee Bolton MD    Next scheduled Follow up:   Future Appointments   Date Time Provider Department Center   7/3/2025 12:00 PM [facility]      Last TSH result:   TSH   Date Value Ref Range Status   11/11/2024 0.793 0.550 - 4.780 uIU/mL Final     Per last Telemed visit on 11/18/24, \"- Check TSH and FT4 in 3 months \"    MyChart message sent to have labs drawn

## 2025-05-27 RX ORDER — LEVOTHYROXINE SODIUM 50 MCG
50 TABLET ORAL
Qty: 90 TABLET | Refills: 1 | Status: SHIPPED | OUTPATIENT
Start: 2025-05-27

## 2025-05-27 NOTE — TELEPHONE ENCOUNTER
Endocrine refill protocol for medications for hypothyroidism and hyperthyroidism    Protocol Criteria:  PASSED Reason: N/A    If all below requirements are met, send a 90-day supply with 1 refill per provider protocol.    Verify appointment with Endocrinology completed in the last 12 months or scheduled in the next 6 months.    Normal TSH result in the past 12 months   Review recent telephone encounters and mychart communications with patient to ensure a dose change has not occurred since last office visit that was not updated in the medication history list     Last completed office visit:Visit date not found   Last completed telemed visit: 11/18/2024 Kaycee Bolton MD  Next scheduled Follow up:   Future Appointments   Date Time Provider Department Center   7/3/2025 12:00 PM [facility] [place]   7/7/2025  4:45 PM Maria Elena Joseph MD [facility] 10 OP [facility] 10 OP   8/21/2025 10:20 AM FAVIO Kerr MD [facility]      Last TSH result:   TSH   Date Value Ref Range Status   11/11/2024 0.793 0.550 - 4.780 uIU/mL Final

## 2025-06-13 ENCOUNTER — PATIENT MESSAGE (OUTPATIENT)
Dept: ENDOCRINOLOGY CLINIC | Facility: CLINIC | Age: 60
End: 2025-06-13

## 2025-06-13 RX ORDER — LEVOTHYROXINE SODIUM 50 MCG
50 TABLET ORAL
Qty: 90 TABLET | Refills: 1 | Status: CANCELLED | OUTPATIENT
Start: 2025-06-13

## 2025-06-13 RX ORDER — LEVOTHYROXINE SODIUM 50 MCG
50 TABLET ORAL
Qty: 7 TABLET | Refills: 0 | Status: SHIPPED | OUTPATIENT
Start: 2025-06-13

## 2025-06-13 NOTE — TELEPHONE ENCOUNTER
Endocrine refill protocol for medications for hypothyroidism and hyperthyroidism    Protocol Criteria:  PASSED     If all below requirements are met, send a 90-day supply with 1 refill per provider protocol.    Verify appointment with Endocrinology completed in the last 12 months or scheduled in the next 6 months.    Normal TSH result in the past 12 months   Review recent telephone encounters and mychart communications with patient to ensure a dose change has not occurred since last office visit that was not updated in the medication history list    Last completed telemed visit: 11/18/2024 Kaycee Bolton MD  Next scheduled Follow up:   Future Appointments   Date Time Provider Department Center   7/3/2025 12:00 PM Kaycee Bolton MD [facility]      Last TSH result:   TSH   Date Value Ref Range Status   11/11/2024 0.793 0.550 - 4.780 uIU/mL Final

## 2025-06-13 NOTE — TELEPHONE ENCOUNTER
Urgent PA via covermymeds synthroid approved pt notified via [facility] request

## 2025-06-17 ENCOUNTER — PATIENT MESSAGE (OUTPATIENT)
Dept: ENDOCRINOLOGY CLINIC | Facility: CLINIC | Age: 60
End: 2025-06-17

## 2025-06-17 NOTE — TELEPHONE ENCOUNTER
Dr. Bolton,  Pt is requesting to have a VV for her annual visit (not scheduled yet). Please advise.      11/18/24 LOV:  This is a 59 year-old woman with a past history of severe depression, alcohol dependence who is here for follow-up of hyponatremia and possible hypothyroidism as well as osteoporosis. She is doing well. She has just finished a PHP program.      1.) Hypothyroidism. TSH is within normal limits, but I think that we can decrease the Synthroid dose even further so that TSH level is above 1.   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

## 2025-07-21 DIAGNOSIS — N95.1 MENOPAUSAL SYMPTOMS: ICD-10-CM

## 2025-07-21 RX ORDER — NORETHINDRONE ACETATE AND ETHINYL ESTRADIOL .5; 2.5 MG/1; UG/1
1 TABLET ORAL DAILY
Qty: 90 TABLET | Refills: 0 | OUTPATIENT
Start: 2025-07-21

## 2025-07-23 ENCOUNTER — PATIENT MESSAGE (OUTPATIENT)
Facility: CLINIC | Age: 60
End: 2025-07-23

## 2025-07-25 ENCOUNTER — TELEPHONE (OUTPATIENT)
Facility: CLINIC | Age: 60
End: 2025-07-25

## 2025-07-25 DIAGNOSIS — K21.9 GASTROESOPHAGEAL REFLUX DISEASE, UNSPECIFIED WHETHER ESOPHAGITIS PRESENT: ICD-10-CM

## 2025-07-25 DIAGNOSIS — R13.19 ESOPHAGEAL DYSPHAGIA: Primary | ICD-10-CM

## 2025-07-25 NOTE — TELEPHONE ENCOUNTER
GI Scheduling Staff:   Please use prior orders to schedule EGD in urgent pool. Patient had appt with me on 8/21 but could not make it. Dx: GERD, dysphagia.  Hold amphetamine for 1 week before procedure

## 2025-07-25 NOTE — TELEPHONE ENCOUNTER
Dr. Kerr    You last saw her 4/2024.  Patient can't make the 8/21 appointment that she has with you.  She does not want to wait until your first available at the end of November, is there a date/time I can offer her sooner?    Thank you

## 2025-07-28 ENCOUNTER — TELEPHONE (OUTPATIENT)
Dept: ENDOCRINOLOGY CLINIC | Facility: CLINIC | Age: 60
End: 2025-07-28

## 2025-07-28 NOTE — TELEPHONE ENCOUNTER
Pt notified appoinment has been changed to video visit and that she has blood work to get prior to her appointment, pt understood and had no further questions or concerns at this time.

## 2025-07-28 NOTE — TELEPHONE ENCOUNTER
Patient is scheduled for an annual appointment on 10/25/25 and is requesting this appointment to be changed to a video visit.  Please call

## 2025-08-04 ENCOUNTER — OFFICE VISIT (OUTPATIENT)
Dept: OBGYN CLINIC | Facility: CLINIC | Age: 60
End: 2025-08-04

## 2025-08-04 VITALS
SYSTOLIC BLOOD PRESSURE: 138 MMHG | HEIGHT: 63 IN | BODY MASS INDEX: 20.37 KG/M2 | WEIGHT: 115 LBS | DIASTOLIC BLOOD PRESSURE: 74 MMHG

## 2025-08-04 DIAGNOSIS — N95.1 MENOPAUSAL SYMPTOMS: Primary | ICD-10-CM

## 2025-08-04 RX ORDER — NORETHINDRONE ACETATE AND ETHINYL ESTRADIOL .5; 2.5 MG/1; UG/1
1 TABLET ORAL DAILY
Qty: 90 TABLET | Refills: 3 | Status: SHIPPED | OUTPATIENT
Start: 2025-08-04

## 2025-08-05 ENCOUNTER — HOSPITAL ENCOUNTER (OUTPATIENT)
Facility: HOSPITAL | Age: 60
Setting detail: HOSPITAL OUTPATIENT SURGERY
Discharge: HOME | End: 2025-08-05
Attending: INTERNAL MEDICINE | Admitting: INTERNAL MEDICINE

## 2025-08-05 ENCOUNTER — ANESTHESIA EVENT (OUTPATIENT)
Dept: ENDOSCOPY | Facility: HOSPITAL | Age: 60
End: 2025-08-05

## 2025-08-05 ENCOUNTER — ANESTHESIA (OUTPATIENT)
Dept: ENDOSCOPY | Facility: HOSPITAL | Age: 60
End: 2025-08-05

## 2025-08-05 DIAGNOSIS — K21.9 GASTROESOPHAGEAL REFLUX DISEASE, UNSPECIFIED WHETHER ESOPHAGITIS PRESENT: ICD-10-CM

## 2025-08-05 DIAGNOSIS — R13.19 ESOPHAGEAL DYSPHAGIA: ICD-10-CM

## 2025-08-05 PROBLEM — K22.2 SCHATZKI'S RING: Status: ACTIVE | Noted: 2025-08-05

## 2025-08-05 PROCEDURE — 43249 ESOPH EGD DILATION <30 MM: CPT | Performed by: INTERNAL MEDICINE

## 2025-08-05 PROCEDURE — 43239 EGD BIOPSY SINGLE/MULTIPLE: CPT | Performed by: INTERNAL MEDICINE

## 2025-08-05 RX ORDER — SODIUM CHLORIDE, SODIUM LACTATE, POTASSIUM CHLORIDE, CALCIUM CHLORIDE 600; 310; 30; 20 MG/100ML; MG/100ML; MG/100ML; MG/100ML
INJECTION, SOLUTION INTRAVENOUS CONTINUOUS
Status: DISCONTINUED | OUTPATIENT
Start: 2025-08-05 | End: 2025-08-05

## 2025-08-05 RX ORDER — LIDOCAINE HYDROCHLORIDE 10 MG/ML
INJECTION, SOLUTION EPIDURAL; INFILTRATION; INTRACAUDAL; PERINEURAL AS NEEDED
Status: DISCONTINUED | OUTPATIENT
Start: 2025-08-05 | End: 2025-08-05 | Stop reason: SURG

## 2025-08-05 RX ORDER — GLYCOPYRROLATE 0.2 MG/ML
INJECTION, SOLUTION INTRAMUSCULAR; INTRAVENOUS AS NEEDED
Status: DISCONTINUED | OUTPATIENT
Start: 2025-08-05 | End: 2025-08-05 | Stop reason: SURG

## 2025-08-05 RX ORDER — NALOXONE HYDROCHLORIDE 0.4 MG/ML
0.08 INJECTION, SOLUTION INTRAMUSCULAR; INTRAVENOUS; SUBCUTANEOUS ONCE AS NEEDED
Status: DISCONTINUED | OUTPATIENT
Start: 2025-08-05 | End: 2025-08-05

## 2025-08-05 RX ADMIN — GLYCOPYRROLATE 0.2 MG: 0.2 INJECTION, SOLUTION INTRAMUSCULAR; INTRAVENOUS at 08:11:00

## 2025-08-05 RX ADMIN — LIDOCAINE HYDROCHLORIDE 25 MG: 10 INJECTION, SOLUTION EPIDURAL; INFILTRATION; INTRACAUDAL; PERINEURAL at 08:11:00

## 2025-08-05 RX ADMIN — SODIUM CHLORIDE, SODIUM LACTATE, POTASSIUM CHLORIDE, CALCIUM CHLORIDE: 600; 310; 30; 20 INJECTION, SOLUTION INTRAVENOUS at 08:09:00

## 2025-08-06 VITALS
WEIGHT: 117 LBS | RESPIRATION RATE: 12 BRPM | DIASTOLIC BLOOD PRESSURE: 62 MMHG | HEART RATE: 78 BPM | TEMPERATURE: 99 F | BODY MASS INDEX: 20.73 KG/M2 | SYSTOLIC BLOOD PRESSURE: 122 MMHG | OXYGEN SATURATION: 100 % | HEIGHT: 63 IN

## (undated) DIAGNOSIS — E03.9 ACQUIRED HYPOTHYROIDISM: ICD-10-CM

## (undated) DIAGNOSIS — E87.1 HYPONATREMIA: Primary | ICD-10-CM

## (undated) DEVICE — Device: Brand: DEFENDO AIR/WATER/SUCTION AND BIOPSY VALVE

## (undated) DEVICE — YANKAUER SUCTION INSTRUMENT NO CONTROL VENT, BULB TIP, CLEAR: Brand: YANKAUER

## (undated) DEVICE — BLOCK BITE LG LUMN 20X27MM GRN DISP

## (undated) DEVICE — Device

## (undated) DEVICE — MEDI-VAC NON-CONDUCTIVE SUCTION TUBING: Brand: CARDINAL HEALTH

## (undated) DEVICE — MEDI-VAC NON-CONDUCTIVE SUCTION TUBING 6MM X 1.8M (6FT.) L: Brand: CARDINAL HEALTH

## (undated) DEVICE — 60 ML SYRINGE REGULAR TIP: Brand: MONOJECT

## (undated) DEVICE — HEXAGONAL CAPTIVATOR STIFF 13M

## (undated) DEVICE — LINE MNTR ADLT SET O2 INTMD

## (undated) DEVICE — KIT ENDO ORCAPOD 160/180/190

## (undated) DEVICE — KIT CLEAN ENDOKIT 1.1OZ GOWNX2

## (undated) DEVICE — Device: Brand: DUAL NARE NASAL CANNULAE FEMALE LUER CON 7FT O2 TUBE

## (undated) DEVICE — FORCEP RADIAL JAW 4

## (undated) DEVICE — SNARE CAPTIFLEX MICRO-OVL OLY

## (undated) DEVICE — Device: Brand: CUSTOM PROCEDURE KIT

## (undated) DEVICE — 35 ML SYRINGE REGULAR TIP: Brand: MONOJECT

## (undated) DEVICE — KIT MFLD FOR SPEC COLL

## (undated) DEVICE — SNARE OPTMZ PLPCTM TRP

## (undated) DEVICE — CONMED SCOPE SAVER BITE BLOCK, 20X27 MM: Brand: SCOPE SAVER

## (undated) DEVICE — DEVICE INFL 60ML 15ATM PRSS COOK SPHR

## (undated) DEVICE — TUBING SCT CLR 6FT .25IN MDVC

## (undated) NOTE — LETTER
Do [address]  [facility],  [place]       05/11/23        Patient: Laura Talley   YOB: 1965   Date of Visit: 5/11/2023       Dear  Dr. Jodee Mahajan Recipients,      Thank you for referring Laura Talley to my practice. Please find my assessment and plan below. Laura Talley is a 65year old  Woman  w/ a pmhx of  SIADH, snoring,  Prior hx of smoking, hypothyroidism, bipolar disorder, Asymmetric hearing loss in her left ear, hx of alcoholism  20 yrs ago, hx of an eating disorder who presents for white matter abnormalities on her mri brain. She denies almost all symptoms of demyelinating disease. She has hiccups but not protracted bouts of hiccups. She has left sided snhl. susac syndrome can cause hearing loss. She does not have the behavioral changes or vision loss, but these are not required. She does note her personality has changed  And she is \"more angry. It is important  The mri be personally reviewed. She will bring the disk. Will order mri c spine to exclude demyelinating disease. 1. Abnormal finding on MRI of brain  - MISCELLANEOUS TESTING; Future  most likely due to small vessel disease. Need to review imaging. 2. Left sided hearing loss  Differential Diagnosis:  ?susac syndrome; doubt  Diagnostics:   will review mri brain iac  Therapeutics:  Per ENT    No orders of the defined types were placed in this encounter.                  Sincerely,   Dany Tran DO   [address] [place] [address] [address] [address]    Document electronically generated by:  Dany Tran DO

## (undated) NOTE — LETTER
5/24/2024              Ashley Yi Star        [address]        [facility] [address]         Dear Ashley,    Our records indicate that the tests ordered for you by Joe Forde MD  have not been done.  If you have, in fact, already completed the tests or you do not wish to have the tests done, please contact our office at THE NUMBER LISTED BELOW.  Otherwise, please proceed with the testing.  Enclosed is a duplicate order for your convenience.    Imaging Order:    US ABDOMEN LIMITED (CPT=76705) (Order #[id number]) on 4/11/24     To schedule this test call Central Scheduling at [phone], Monday through Friday between 7:30am to 6pm and on Saturday between 8am and 1pm.   Evening and weekend appointments for your exam are available.         Sincerely,    Joe Forde MD  [address]  [facility] [id number] [phone]

## (undated) NOTE — LETTER
06/01/21        [address]      Dear [address],    [address] [facility] records indicate that you have outstanding lab work and or testing that was ordered for you and has not yet been completed:  Orders Placed This Encounter      CBC Delfina Blancas

## (undated) NOTE — LETTER
[address] [facility] [address] [place], [place]  Authorization for Surgical Operation and Procedure                                                                                           I hereby authorize Ricki Casas MD, my physician and his/her assistants (if applicable), which may include medical students, residents, and/or fellows, to perform the following surgical operation/ procedure and administer such anesthesia as may be determined necessary by my physician: Operation/Procedure name (s) ESOPHAGOGASTRODUODENOSCOPY (EGD) on [place]   2. I recognize that during the surgical operation/procedure, unforeseen conditions may necessitate additional or different procedures than those listed above. I, therefore, further authorize and request that the above-named surgeon, assistants, or designees perform such procedures as are, in their judgment, necessary and desirable. 3.   My surgeon/physician has discussed prior to my surgery the potential benefits, risks and side effects of this procedure; the likelihood of achieving goals; and potential problems that might occur during recuperation. They also discussed reasonable alternatives to the procedure, including risks, benefits, and side effects related to the alternatives and risks related to not receiving this procedure. I have had all my questions answered and I acknowledge that no guarantee has been made as to the result that may be obtained. 4.   Should the need arise during my operation/procedure, which includes change of level of care prior to discharge, I also consent to the administration of blood and/or blood products. Further, I understand that despite careful testing and screening of blood or blood products by collecting agencies, I may still be subject to ill effects as a result of receiving a blood transfusion and/or blood products.   The following are some, but not all, of the potential risks that can occur: fever and allergic reactions, hemolytic reactions, transmission of diseases such as Hepatitis, AIDS and Cytomegalovirus (CMV) and fluid overload. In the event that I wish to have an autologous transfusion of my own blood, or a directed donor transfusion, I will discuss this with my physician. Check only if Refusing Blood or Blood Products  I understand refusal of blood or blood products as deemed necessary by my physician may have serious consequences to my condition to include possible death. I hereby assume responsibility for my refusal and release the hospital, its personnel, and my physicians from any responsibility for the consequences of my refusal.    o  Refuse   5. I authorize the use of any specimen, organs, tissues, body parts or foreign objects that may be removed from my body during the operation/procedure for diagnosis, research or teaching purposes and their subsequent disposal by hospital authorities. I also authorize the release of specimen test results and/or written reports to my treating physician on the hospital medical staff or other referring or consulting physicians involved in my care, at the discretion of the Pathologist or my treating physician. 6.   I consent to the photographing or videotaping of the operations or procedures to be performed, including appropriate portions of my body for medical, scientific, or educational purposes, provided my identity is not revealed by the pictures or by descriptive texts accompanying them. If the procedure has been photographed/videotaped, the surgeon will obtain the original picture, image, videotape or CD. The hospital will not be responsible for storage, release or maintenance of the picture, image, tape or CD.    7.   I consent to the presence of a  or observers in the operating room as deemed necessary by my physician or their designees.     8.   I recognize that in the event my procedure results in extended X-Ray/fluoroscopy time, I may develop a skin reaction. 9. If I have a Do Not Attempt Resuscitation (DNAR) order in place, that status will be suspended while in the operating room, procedural suite, and during the recovery period unless otherwise explicitly stated by me (or a person authorized to consent on my behalf). The surgeon or my attending physician will determine when the applicable recovery period ends for purposes of reinstating the DNAR order. 10. Patients having a sterilization procedure: I understand that if the procedure is successful the results will be permanent and it will therefore be impossible for me to inseminate, conceive, or bear children. I also understand that the procedure is intended to result in sterility, although the result has not been guaranteed. 11. I acknowledge that my physician has explained sedation/analgesia administration to me including the risk and benefits I consent to the administration of sedation/analgesia as may be necessary or desirable in the judgment of my physician. I CERTIFY THAT I HAVE READ AND FULLY UNDERSTAND THE ABOVE CONSENT TO OPERATION and/or OTHER PROCEDURE.     _________________________________________ _________________________________     ___________________________________  Signature of Patient     Signature of Responsible Person                   Printed Name of Responsible Person                              _________________________________________ ______________________________        ___________________________________  Signature of Witness         Date  Time         Relationship to Patient    STATEMENT OF PHYSICIAN My signature below affirms that prior to the time of the procedure; I have explained to the patient and/or his/her legal representative, the risks and benefits involved in the proposed treatment and any reasonable alternative to the proposed treatment.  I have also explained the risks and benefits involved in refusal of the proposed treatment and alternatives to the proposed treatment and have answered the patient's questions.  If I have a significant financial interest in a co-management agreement or a significant financial interest in any product or implant, or other significant relationship used in this procedure/surgery, I have disclosed this and had a discussion with my patient.     _______________________________________________________________ _____________________________  Myesha Vera)                                                                                         (Date)                                   (Time)  Patient Name: Roman Koyanagi    : 1965   Printed: 2023      Medical Record #: [id number]                                              Page 1 of 1

## (undated) NOTE — LETTER
[facility]       10[facility]        Patient: Justin Crowley   YOB: 1965   Date of Visit: 10/11/2018       Dear  Dr. Sharda Greenwood DO,      Thank you for referring Justin Crowley to my practice.   Please find my

## (undated) NOTE — LETTER
7/1/2024              Ashley Yipola Graves        [address]        [facility] [address]         Dear Ashley,    Our records indicate that the tests ordered for you by FAVIO Kerr MD  have not been done.  If you have, in fact, already completed the tests or you do not wish to have the tests done, please contact our office at THE NUMBER LISTED BELOW.  Otherwise, please proceed with the testing.  Enclosed is a duplicate order for your convenience.  Imaging Order:    US ABDOMEN LIMITED (CPT=76705) (Order #[id number]) on 4/11/24       To schedule this test at any [facility] Facility, call Central Scheduling at [phone], Monday through Friday between 7:30am to 6pm and on Saturday between 8am and 1pm.   Evening and weekend appointments for your exam are available.         Sincerely,    FAVIO Kerr MD  [facility]  [address] [id number] [phone]